# Patient Record
Sex: FEMALE | Race: WHITE | NOT HISPANIC OR LATINO
[De-identification: names, ages, dates, MRNs, and addresses within clinical notes are randomized per-mention and may not be internally consistent; named-entity substitution may affect disease eponyms.]

---

## 2024-04-17 PROBLEM — Z00.00 ENCOUNTER FOR PREVENTIVE HEALTH EXAMINATION: Status: ACTIVE | Noted: 2024-04-17

## 2024-04-23 ENCOUNTER — APPOINTMENT (OUTPATIENT)
Dept: COLORECTAL SURGERY | Facility: CLINIC | Age: 89
End: 2024-04-23
Payer: MEDICARE

## 2024-04-23 VITALS
OXYGEN SATURATION: 97 % | BODY MASS INDEX: 21.6 KG/M2 | HEIGHT: 60 IN | RESPIRATION RATE: 16 BRPM | TEMPERATURE: 98.4 F | DIASTOLIC BLOOD PRESSURE: 66 MMHG | SYSTOLIC BLOOD PRESSURE: 115 MMHG | HEART RATE: 68 BPM | WEIGHT: 110 LBS

## 2024-04-23 DIAGNOSIS — Z80.0 FAMILY HISTORY OF MALIGNANT NEOPLASM OF DIGESTIVE ORGANS: ICD-10-CM

## 2024-04-23 DIAGNOSIS — K56.609 UNSPECIFIED INTESTINAL OBSTRUCTION, UNSPECIFIED AS TO PARTIAL VERSUS COMPLETE OBSTRUCTION: ICD-10-CM

## 2024-04-23 DIAGNOSIS — Z86.79 PERSONAL HISTORY OF OTHER DISEASES OF THE CIRCULATORY SYSTEM: ICD-10-CM

## 2024-04-23 DIAGNOSIS — Z87.442 PERSONAL HISTORY OF URINARY CALCULI: ICD-10-CM

## 2024-04-23 DIAGNOSIS — Z78.9 OTHER SPECIFIED HEALTH STATUS: ICD-10-CM

## 2024-04-23 DIAGNOSIS — Z86.39 PERSONAL HISTORY OF OTHER ENDOCRINE, NUTRITIONAL AND METABOLIC DISEASE: ICD-10-CM

## 2024-04-23 DIAGNOSIS — Z86.19 PERSONAL HISTORY OF OTHER INFECTIOUS AND PARASITIC DISEASES: ICD-10-CM

## 2024-04-23 DIAGNOSIS — Z87.440 PERSONAL HISTORY OF URINARY (TRACT) INFECTIONS: ICD-10-CM

## 2024-04-23 PROCEDURE — 99204 OFFICE O/P NEW MOD 45 MIN: CPT

## 2024-04-23 RX ORDER — LEVOTHYROXINE SODIUM 137 UG/1
TABLET ORAL
Refills: 0 | Status: ACTIVE | COMMUNITY

## 2024-04-23 RX ORDER — LORATADINE 10 MG/1
10 CAPSULE, LIQUID FILLED ORAL
Refills: 0 | Status: ACTIVE | COMMUNITY

## 2024-04-23 RX ORDER — NEBIVOLOL HYDROCHLORIDE 2.5 MG/1
2.5 TABLET ORAL
Refills: 0 | Status: ACTIVE | COMMUNITY

## 2024-04-23 RX ORDER — ESCITALOPRAM OXALATE 5 MG/1
5 TABLET ORAL
Refills: 0 | Status: ACTIVE | COMMUNITY

## 2024-04-23 NOTE — PHYSICAL EXAM
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

## 2024-04-23 NOTE — ASSESSMENT
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 Hospital record reviewed -Patient with a partially obstructing  terminal ileal/cecal mass in the sigmoid colon lesion. We discussed the surgical treatment of these lesions at length including subtotal colectomy with end ileostomy, right hemicolectomy and sigmoid colon resection with and without diverting stoma. We reviewed the risks and benefits of each option including leak rates And need for additional surgery in the case of reversal. The patient elected to transfer her care to this facility, I would recommend flexible sigmoidoscopy to evaluate location of sigmoid colon lesion and to determine the feasibility of endomucosal resection.  However, it appears from the colonoscopy report but this did not seem to be an option -We discussed and has recovery protocol and laparoscopic approaches -Given that the patient is largely wheelchair-bound, my inclination would be for the patient to undergo subtotal colectomy with end ileostomy. We also discussed the possibility of converting ileostomy. -All questions were answered -The patient wishes to consider her options and will call the office if she wishes to proceed -In the meantime, I recommended patient follow a low residue diet and take MiraLax daily. Unfortunately, some intervention will be required sooner than later given recent obstruction

## 2024-04-23 NOTE — CONSULT LETTER
[Dear  ___] : Dear  [unfilled], [Consult Letter:] : I had the pleasure of evaluating your patient, [unfilled]. [Please see my note below.] : Please see my note below. [Consult Closing:] : Thank you very much for allowing me to participate in the care of this patient.  If you have any questions, please do not hesitate to contact me. [Sincerely,] : Sincerely, [FreeTextEntry2] : Robinson Majano [FreeTextEntry3] : Julio Chung MD FACS Chief Colon and Rectal Surgery [facility]

## 2024-04-23 NOTE — REASON FOR VISIT
[Consultation] : a consultation visit [Family Member] : family member [FreeTextEntry1] : Pt. intake forms reviewed.

## 2024-04-23 NOTE — REVIEW OF SYSTEMS
[Feeling Tired] : feeling tired [Eyesight Problems] : eyesight problems [As Noted in HPI] : as noted in HPI [Abdominal Pain] : abdominal pain [Arthralgias] : arthralgias [Depression] : depression [Negative] : Heme/Lymph [Fever] : no fever [Chills] : no chills [Recent Weight Gain (___ Lbs)] : no recent weight gain [Recent Weight Loss (___ Lbs)] : no recent weight loss [Eye Pain] : no eye pain [Red Eyes] : eyes not red [Discharge From Eyes] : no purulent discharge from the eyes [Dry Eyes] : no dryness of the eyes [Eyes Itch] : no itching of the eyes [Heart Rate Is Slow] : the heart rate was not slow [Heart Rate Is Fast] : the heart rate was not fast [Chest Pain] : no chest pain [Palpitations] : no palpitations [Leg Claudication] : no intermittent leg claudication [Suicidal] : not suicidal [Sleep Disturbances] : no sleep disturbances [Anxiety] : no anxiety [FreeTextEntry3] : eyeglasses [FreeTextEntry8] : hx of kidney stones [FreeTextEntry9] : ambulates with walker, kyphosis, requiring PT for ambulation [de-identified] : hx of hypothyroid on Levothyroxine

## 2024-04-23 NOTE — HISTORY OF PRESENT ILLNESS
[FreeTextEntry1] : 87 y/o female with history of SBO presents today for consultation.  She was recently hospitalized at [facility] 3/20/24-3/28/24 with SBO.  CT showed terminal ileum thickening, possible small bowel neoplasm with upstream fluid distention suggesting partial SBO.    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   Pathology of terminal ileum biopsy revealed at least high-grade dysplasia, superficial fragments. 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   They are here for a second opinion.    Of note, she has significant medical problems including HTN, hypothyroidism, osteoporosis, hx of UTI, kidney stones, septic shock s/p cystoscopy, stone extraction.

## 2024-05-07 ENCOUNTER — APPOINTMENT (OUTPATIENT)
Dept: COLORECTAL SURGERY | Facility: CLINIC | Age: 89
End: 2024-05-07
Payer: MEDICARE

## 2024-05-07 DIAGNOSIS — D49.0 NEOPLASM OF UNSPECIFIED BEHAVIOR OF DIGESTIVE SYSTEM: ICD-10-CM

## 2024-05-07 PROCEDURE — 45331Z: CUSTOM

## 2024-05-07 PROCEDURE — 99213 OFFICE O/P EST LOW 20 MIN: CPT | Mod: 25

## 2024-05-07 RX ORDER — NEOMYCIN SULFATE 500 MG/1
500 TABLET ORAL
Qty: 3 | Refills: 0 | Status: ACTIVE | COMMUNITY
Start: 2024-05-07 | End: 1900-01-01

## 2024-05-07 RX ORDER — METRONIDAZOLE 500 MG/1
500 TABLET ORAL
Qty: 3 | Refills: 0 | Status: ACTIVE | COMMUNITY
Start: 2024-05-07 | End: 1900-01-01

## 2024-05-07 NOTE — ASSESSMENT
[FreeTextEntry1] : Terminal ileal mass in sigmoid colon mass -We once again discussed surgical options  this included attempt at sigmoid polyp excision with right hemicolectomy, subtotal colectomy with end ileostomy and descending colon resection and right hemicolectomy. Risks and benefits of each Option were reviewed at length. Given patient's age, history of fecal incontinence and decreased mobility, I recommended subtotal colectomy with ileostomy. -We discussed risks and benefits of this procedure at length including bleeding, infection, high ileostomy output -Enhance recovery protocol was reviewed as was the possibility of rehabilitation -Patient to obtain a cardiac clearance -We'll schedule on a somewhat urgent basis given known obstructing lesion

## 2024-05-07 NOTE — PHYSICAL EXAM
[FreeTextEntry1] : Alert and oriented x3 Moves all extremities, largely wheelchair-bound Abdomen soft nontender External anal exam no masses or lesions Digital rectal exam normal tone Flexible sigmoidoscopy-procedure performed in standard fashion under direct vision with an adult scope. Patient tolerated without event or complication. Scope introduced in the anus and advanced to the descending colon. A polypoid 4-5 cm mass was appreciated it appeared like it may be on a stalk. Multiple biopsies were taken with cold forceps in standard fashion
37.1

## 2024-05-07 NOTE — HISTORY OF PRESENT ILLNESS
[FreeTextEntry1] : 88-year-old female with history of recently diagnosed obstructing cecal mass with large sigmoid colon polyp first mass. Patient presents today to discuss surgical options as well as for sigmoidoscopy to evaluate sigmoid lesion. She reports multiple loose bowel movements per day currently with persistent incontinence. No fevers or chills no nausea or vomiting. No aggravating factors

## 2024-05-10 LAB — CORE LAB BIOPSY: NORMAL

## 2024-05-14 ENCOUNTER — TRANSCRIPTION ENCOUNTER (OUTPATIENT)
Age: 89
End: 2024-05-14

## 2024-05-16 ENCOUNTER — OUTPATIENT (OUTPATIENT)
Dept: OUTPATIENT SERVICES | Facility: HOSPITAL | Age: 89
LOS: 1 days | End: 2024-05-16

## 2024-05-16 VITALS
OXYGEN SATURATION: 96 % | HEIGHT: 63 IN | DIASTOLIC BLOOD PRESSURE: 64 MMHG | TEMPERATURE: 98 F | WEIGHT: 98.99 LBS | RESPIRATION RATE: 20 BRPM | SYSTOLIC BLOOD PRESSURE: 115 MMHG | HEART RATE: 61 BPM

## 2024-05-16 DIAGNOSIS — Z98.890 OTHER SPECIFIED POSTPROCEDURAL STATES: Chronic | ICD-10-CM

## 2024-05-16 DIAGNOSIS — D49.0 NEOPLASM OF UNSPECIFIED BEHAVIOR OF DIGESTIVE SYSTEM: ICD-10-CM

## 2024-05-16 DIAGNOSIS — I10 ESSENTIAL (PRIMARY) HYPERTENSION: ICD-10-CM

## 2024-05-16 LAB
A1C WITH ESTIMATED AVERAGE GLUCOSE RESULT: 5 % — SIGNIFICANT CHANGE UP (ref 4–5.6)
BLD GP AB SCN SERPL QL: NEGATIVE — SIGNIFICANT CHANGE UP
ESTIMATED AVERAGE GLUCOSE: 97 — SIGNIFICANT CHANGE UP
RH IG SCN BLD-IMP: POSITIVE — SIGNIFICANT CHANGE UP
RH IG SCN BLD-IMP: POSITIVE — SIGNIFICANT CHANGE UP

## 2024-05-16 RX ORDER — LEVOTHYROXINE SODIUM 125 MCG
1 TABLET ORAL
Refills: 0 | DISCHARGE

## 2024-05-16 RX ORDER — SODIUM CHLORIDE 9 MG/ML
1000 INJECTION, SOLUTION INTRAVENOUS
Refills: 0 | Status: DISCONTINUED | OUTPATIENT
Start: 2024-05-20 | End: 2024-05-20

## 2024-05-16 NOTE — H&P PST ADULT - NECK
supple/no palpable masses Propranolol Pregnancy And Lactation Text: This medication is Pregnancy Category C and it isn't known if it is safe during pregnancy. It is excreted in breast milk.

## 2024-05-16 NOTE — H&P PST ADULT - HISTORY OF PRESENT ILLNESS
89 y/o female with history of SBO presents today for consultation. She was recently hospitalized at [facility] 3/20/24-3/28/24 with SBO. CT showed terminal ileum thickening, possible small bowel neoplasm with upstream fluid distention suggesting partial SBO.  Of note, she has significant medical problems including HTN, hypothyroidism, osteoporosis, hx of UTI, kidney stones, septic shock s/p cystoscopy, stone extraction.  Patient is now scheduled for Subtotal Colectomy tentatively on 05/20/24.  ?  ?  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 87 y/o female with history of SBO presents today for preop evaluation  with dx of Neoplasm of Digestive System. She was recently hospitalized at [facility] 3/20/24-3/28/24 with SBO. CT showed terminal ileum thickening, possible small bowel neoplasm with upstream fluid distention suggesting partial SBO.  Of note, she has significant medical problems including HTN, hypothyroidism, osteoporosis, hx of UTI, kidney stones, septic shock s/p cystoscopy, stone extraction.  Patient is now scheduled for Subtotal Colectomy tentatively on 05/20/24.  ?  ?  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 89 y/o female with history of SBO presents today for preop evaluation  with dx of Neoplasm of Digestive System. She was recently hospitalized at [facility] 3/20/24-3/28/24 with SBO. CT showed terminal ileum thickening, possible small bowel neoplasm with upstream fluid distention suggesting partial SBO.  Of note, she has significant medical problems including HTN, hypothyroidism, osteoporosis, hx of UTI, kidney stones, septic shock s/p cystoscopy, stone extraction and remote hx of subdural hematoma s/p craniotomy.  Patient is now scheduled for Subtotal Colectomy tentatively on 05/20/24.  ?  ?  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

## 2024-05-16 NOTE — H&P PST ADULT - FUNCTIONAL STATUS
less than 4 METS DASI=3.30/less than 4 METS DASI=3.30 had multiple fall accidents currently gets around in wheelchair for fear of falling/less than 4 METS

## 2024-05-16 NOTE — H&P PST ADULT - NSICDXPASTMEDICALHX_GEN_ALL_CORE_FT
PAST MEDICAL HISTORY:  History of subdural hematoma     HTN (hypertension)     Hypothyroidism     Major depression     Osteoporosis      PAST MEDICAL HISTORY:  History of subdural hematoma     HTN (hypertension)     Hypothyroidism     Major depression     Neoplasm of digestive system     Osteoporosis

## 2024-05-16 NOTE — H&P PST ADULT - NSANTHOSAYNRD_GEN_A_CORE
No. ESTRADA screening performed.  STOP BANG Legend: 0-2 = LOW Risk; 3-4 = INTERMEDIATE Risk; 5-8 = HIGH Risk

## 2024-05-16 NOTE — H&P PST ADULT - PROBLEM SELECTOR PLAN 2
Patient's son-in-law Julio instructed that patient should take Bystolic on the morning of procedure.

## 2024-05-16 NOTE — H&P PST ADULT - MUSCULOSKELETAL COMMENTS
wheelchair bound wheelchair bound from multiple fall accidents patient is afraid to walk with walker  for fear of falling

## 2024-05-16 NOTE — H&P PST ADULT - PROBLEM SELECTOR PLAN 1
Scheduled for Subtotal Colectomy on 05/20/24.  Preop instructions provided to patient's son-in-law Julio who verbalizes understanding.  Labs done and results pending.  Famotidine provided with instructions. Hibiclens provided with instructions and was signed by patient. Teach-back method was utilized to assess patient's understanding. Julio verbalized understanding.  Patient's son in law instructed that patient should take Synthroid on the morning of procedure.

## 2024-05-16 NOTE — H&P PST ADULT - NSICDXPASTSURGICALHX_GEN_ALL_CORE_FT
PAST SURGICAL HISTORY:  S/P cystoscopy     Status post craniectomy      PAST SURGICAL HISTORY:  S/P cystoscopy     Status post craniotomy

## 2024-05-17 NOTE — ASU PATIENT PROFILE, ADULT - NSICDXPASTMEDICALHX_GEN_ALL_CORE_FT
PAST MEDICAL HISTORY:  History of subdural hematoma     HTN (hypertension)     Hypothyroidism     Major depression     Neoplasm of digestive system     Osteoporosis

## 2024-05-19 ENCOUNTER — TRANSCRIPTION ENCOUNTER (OUTPATIENT)
Age: 89
End: 2024-05-19

## 2024-05-20 ENCOUNTER — INPATIENT (INPATIENT)
Facility: HOSPITAL | Age: 89
LOS: 8 days | Discharge: HOME HEALTH | End: 2024-05-29
Attending: STUDENT IN AN ORGANIZED HEALTH CARE EDUCATION/TRAINING PROGRAM | Admitting: STUDENT IN AN ORGANIZED HEALTH CARE EDUCATION/TRAINING PROGRAM
Payer: MEDICARE

## 2024-05-20 ENCOUNTER — APPOINTMENT (OUTPATIENT)
Dept: COLORECTAL SURGERY | Facility: HOSPITAL | Age: 89
End: 2024-05-20
Payer: MEDICARE

## 2024-05-20 ENCOUNTER — RESULT REVIEW (OUTPATIENT)
Age: 89
End: 2024-05-20

## 2024-05-20 VITALS
DIASTOLIC BLOOD PRESSURE: 51 MMHG | WEIGHT: 98.99 LBS | RESPIRATION RATE: 16 BRPM | OXYGEN SATURATION: 95 % | HEIGHT: 63 IN | TEMPERATURE: 98 F | SYSTOLIC BLOOD PRESSURE: 119 MMHG | HEART RATE: 64 BPM

## 2024-05-20 DIAGNOSIS — D49.0 NEOPLASM OF UNSPECIFIED BEHAVIOR OF DIGESTIVE SYSTEM: ICD-10-CM

## 2024-05-20 DIAGNOSIS — Z98.890 OTHER SPECIFIED POSTPROCEDURAL STATES: Chronic | ICD-10-CM

## 2024-05-20 LAB
ADD ON TEST-SPECIMEN IN LAB: SIGNIFICANT CHANGE UP
ALBUMIN SERPL ELPH-MCNC: 3 G/DL — LOW (ref 3.3–5)
ALP SERPL-CCNC: 45 U/L — SIGNIFICANT CHANGE UP (ref 40–120)
ALT FLD-CCNC: 8 U/L — SIGNIFICANT CHANGE UP (ref 4–33)
ANION GAP SERPL CALC-SCNC: 12 MMOL/L — SIGNIFICANT CHANGE UP (ref 7–14)
APTT BLD: 29.5 SEC — SIGNIFICANT CHANGE UP (ref 24.5–35.6)
AST SERPL-CCNC: 16 U/L — SIGNIFICANT CHANGE UP (ref 4–32)
BASOPHILS # BLD AUTO: 0.02 K/UL — SIGNIFICANT CHANGE UP (ref 0–0.2)
BASOPHILS NFR BLD AUTO: 0.2 % — SIGNIFICANT CHANGE UP (ref 0–2)
BILIRUB DIRECT SERPL-MCNC: 0.2 MG/DL — SIGNIFICANT CHANGE UP (ref 0–0.3)
BILIRUB INDIRECT FLD-MCNC: 0.6 MG/DL — SIGNIFICANT CHANGE UP (ref 0–1)
BILIRUB SERPL-MCNC: 0.8 MG/DL — SIGNIFICANT CHANGE UP (ref 0.2–1.2)
BLOOD GAS ARTERIAL - LYTES,HGB,ICA,LACT RESULT: SIGNIFICANT CHANGE UP
BUN SERPL-MCNC: 12 MG/DL — SIGNIFICANT CHANGE UP (ref 7–23)
CALCIUM SERPL-MCNC: 8.1 MG/DL — LOW (ref 8.4–10.5)
CHLORIDE SERPL-SCNC: 106 MMOL/L — SIGNIFICANT CHANGE UP (ref 98–107)
CO2 SERPL-SCNC: 22 MMOL/L — SIGNIFICANT CHANGE UP (ref 22–31)
CREAT SERPL-MCNC: 0.63 MG/DL — SIGNIFICANT CHANGE UP (ref 0.5–1.3)
EGFR: 86 ML/MIN/1.73M2 — SIGNIFICANT CHANGE UP
EOSINOPHIL # BLD AUTO: 0.03 K/UL — SIGNIFICANT CHANGE UP (ref 0–0.5)
EOSINOPHIL NFR BLD AUTO: 0.3 % — SIGNIFICANT CHANGE UP (ref 0–6)
GLUCOSE BLDC GLUCOMTR-MCNC: 103 MG/DL — HIGH (ref 70–99)
GLUCOSE SERPL-MCNC: 106 MG/DL — HIGH (ref 70–99)
HCT VFR BLD CALC: 25.9 % — LOW (ref 34.5–45)
HCT VFR BLD CALC: 32.4 % — LOW (ref 34.5–45)
HGB BLD-MCNC: 10.4 G/DL — LOW (ref 11.5–15.5)
HGB BLD-MCNC: 8.6 G/DL — LOW (ref 11.5–15.5)
IANC: 8.24 K/UL — HIGH (ref 1.8–7.4)
IMM GRANULOCYTES NFR BLD AUTO: 0.3 % — SIGNIFICANT CHANGE UP (ref 0–0.9)
INR BLD: 1 RATIO — SIGNIFICANT CHANGE UP (ref 0.85–1.18)
LYMPHOCYTES # BLD AUTO: 0.76 K/UL — LOW (ref 1–3.3)
LYMPHOCYTES # BLD AUTO: 7.9 % — LOW (ref 13–44)
MAGNESIUM SERPL-MCNC: 1.6 MG/DL — SIGNIFICANT CHANGE UP (ref 1.6–2.6)
MCHC RBC-ENTMCNC: 29.4 PG — SIGNIFICANT CHANGE UP (ref 27–34)
MCHC RBC-ENTMCNC: 30.1 PG — SIGNIFICANT CHANGE UP (ref 27–34)
MCHC RBC-ENTMCNC: 32.1 GM/DL — SIGNIFICANT CHANGE UP (ref 32–36)
MCHC RBC-ENTMCNC: 33.2 GM/DL — SIGNIFICANT CHANGE UP (ref 32–36)
MCV RBC AUTO: 90.6 FL — SIGNIFICANT CHANGE UP (ref 80–100)
MCV RBC AUTO: 91.5 FL — SIGNIFICANT CHANGE UP (ref 80–100)
MONOCYTES # BLD AUTO: 0.55 K/UL — SIGNIFICANT CHANGE UP (ref 0–0.9)
MONOCYTES NFR BLD AUTO: 5.7 % — SIGNIFICANT CHANGE UP (ref 2–14)
NEUTROPHILS # BLD AUTO: 8.24 K/UL — HIGH (ref 1.8–7.4)
NEUTROPHILS NFR BLD AUTO: 85.6 % — HIGH (ref 43–77)
NRBC # BLD: 0 /100 WBCS — SIGNIFICANT CHANGE UP (ref 0–0)
NRBC # BLD: 0 /100 WBCS — SIGNIFICANT CHANGE UP (ref 0–0)
NRBC # FLD: 0 K/UL — SIGNIFICANT CHANGE UP (ref 0–0)
NRBC # FLD: 0 K/UL — SIGNIFICANT CHANGE UP (ref 0–0)
PHOSPHATE SERPL-MCNC: 2.6 MG/DL — SIGNIFICANT CHANGE UP (ref 2.5–4.5)
PLATELET # BLD AUTO: 197 K/UL — SIGNIFICANT CHANGE UP (ref 150–400)
PLATELET # BLD AUTO: 243 K/UL — SIGNIFICANT CHANGE UP (ref 150–400)
POTASSIUM SERPL-MCNC: 3.2 MMOL/L — LOW (ref 3.5–5.3)
POTASSIUM SERPL-SCNC: 3.2 MMOL/L — LOW (ref 3.5–5.3)
PROT SERPL-MCNC: 5.5 G/DL — LOW (ref 6–8.3)
PROTHROM AB SERPL-ACNC: 11.3 SEC — SIGNIFICANT CHANGE UP (ref 9.5–13)
RBC # BLD: 2.86 M/UL — LOW (ref 3.8–5.2)
RBC # BLD: 3.54 M/UL — LOW (ref 3.8–5.2)
RBC # FLD: 14.8 % — HIGH (ref 10.3–14.5)
RBC # FLD: 15.2 % — HIGH (ref 10.3–14.5)
SODIUM SERPL-SCNC: 140 MMOL/L — SIGNIFICANT CHANGE UP (ref 135–145)
WBC # BLD: 9.11 K/UL — SIGNIFICANT CHANGE UP (ref 3.8–10.5)
WBC # BLD: 9.63 K/UL — SIGNIFICANT CHANGE UP (ref 3.8–10.5)
WBC # FLD AUTO: 9.11 K/UL — SIGNIFICANT CHANGE UP (ref 3.8–10.5)
WBC # FLD AUTO: 9.63 K/UL — SIGNIFICANT CHANGE UP (ref 3.8–10.5)

## 2024-05-20 PROCEDURE — 88309 TISSUE EXAM BY PATHOLOGIST: CPT | Mod: 26

## 2024-05-20 PROCEDURE — 88342 IMHCHEM/IMCYTCHM 1ST ANTB: CPT | Mod: 26

## 2024-05-20 PROCEDURE — 88341 IMHCHEM/IMCYTCHM EA ADD ANTB: CPT | Mod: 26

## 2024-05-20 PROCEDURE — 88305 TISSUE EXAM BY PATHOLOGIST: CPT | Mod: 26

## 2024-05-20 PROCEDURE — 44210 LAPARO TOTAL PROCTOCOLECTOMY: CPT

## 2024-05-20 PROCEDURE — 49321 LAPAROSCOPY BIOPSY: CPT | Mod: 59

## 2024-05-20 DEVICE — LIGATING CLIPS WECK HORIZON LARGE (ORANGE) 24
Type: IMPLANTABLE DEVICE | Status: NON-FUNCTIONAL
Removed: 2024-05-20

## 2024-05-20 DEVICE — STAPLER COVIDIEN GIA 80-3.0MM PURPLE
Type: IMPLANTABLE DEVICE | Status: NON-FUNCTIONAL
Removed: 2024-05-20

## 2024-05-20 DEVICE — STAPLER COVIDIEN GIA 80-3.0MM PURPLE RELOAD
Type: IMPLANTABLE DEVICE | Status: NON-FUNCTIONAL
Removed: 2024-05-20

## 2024-05-20 DEVICE — LIGATING CLIPS WECK HORIZON MEDIUM (BLUE) 24
Type: IMPLANTABLE DEVICE | Status: NON-FUNCTIONAL
Removed: 2024-05-20

## 2024-05-20 RX ORDER — FENTANYL CITRATE 50 UG/ML
25 INJECTION INTRAVENOUS
Refills: 0 | Status: DISCONTINUED | OUTPATIENT
Start: 2024-05-20 | End: 2024-05-21

## 2024-05-20 RX ORDER — POTASSIUM CHLORIDE 20 MEQ
10 PACKET (EA) ORAL
Refills: 0 | Status: COMPLETED | OUTPATIENT
Start: 2024-05-20 | End: 2024-05-20

## 2024-05-20 RX ORDER — EPHEDRINE SULFATE/0.9% NACL/PF 25 MG/5 ML
5 SYRINGE (ML) INTRAVENOUS ONCE
Refills: 0 | Status: COMPLETED | OUTPATIENT
Start: 2024-05-20 | End: 2024-05-20

## 2024-05-20 RX ORDER — ESCITALOPRAM OXALATE 10 MG/1
1 TABLET, FILM COATED ORAL
Refills: 0 | DISCHARGE

## 2024-05-20 RX ORDER — ESCITALOPRAM OXALATE 10 MG/1
5 TABLET, FILM COATED ORAL DAILY
Refills: 0 | Status: DISCONTINUED | OUTPATIENT
Start: 2024-05-21 | End: 2024-05-29

## 2024-05-20 RX ORDER — OXYCODONE HYDROCHLORIDE 5 MG/1
2.5 TABLET ORAL EVERY 4 HOURS
Refills: 0 | Status: DISCONTINUED | OUTPATIENT
Start: 2024-05-20 | End: 2024-05-21

## 2024-05-20 RX ORDER — OXYCODONE HYDROCHLORIDE 5 MG/1
5 TABLET ORAL EVERY 4 HOURS
Refills: 0 | Status: DISCONTINUED | OUTPATIENT
Start: 2024-05-20 | End: 2024-05-21

## 2024-05-20 RX ORDER — LORATADINE 10 MG/1
10 TABLET ORAL DAILY
Refills: 0 | Status: DISCONTINUED | OUTPATIENT
Start: 2024-05-20 | End: 2024-05-29

## 2024-05-20 RX ORDER — HEPARIN SODIUM 5000 [USP'U]/ML
5000 INJECTION INTRAVENOUS; SUBCUTANEOUS EVERY 8 HOURS
Refills: 0 | Status: DISCONTINUED | OUTPATIENT
Start: 2024-05-20 | End: 2024-05-29

## 2024-05-20 RX ORDER — ALBUMIN HUMAN 25 %
250 VIAL (ML) INTRAVENOUS ONCE
Refills: 0 | Status: COMPLETED | OUTPATIENT
Start: 2024-05-20 | End: 2024-05-20

## 2024-05-20 RX ORDER — HYDROMORPHONE HYDROCHLORIDE 2 MG/ML
0.5 INJECTION INTRAMUSCULAR; INTRAVENOUS; SUBCUTANEOUS
Refills: 0 | Status: DISCONTINUED | OUTPATIENT
Start: 2024-05-20 | End: 2024-05-20

## 2024-05-20 RX ORDER — CHLORHEXIDINE GLUCONATE 213 G/1000ML
1 SOLUTION TOPICAL ONCE
Refills: 0 | Status: COMPLETED | OUTPATIENT
Start: 2024-05-20 | End: 2024-05-20

## 2024-05-20 RX ORDER — ACETAMINOPHEN 500 MG
1000 TABLET ORAL EVERY 6 HOURS
Refills: 0 | Status: DISCONTINUED | OUTPATIENT
Start: 2024-05-20 | End: 2024-05-21

## 2024-05-20 RX ORDER — ONDANSETRON 8 MG/1
4 TABLET, FILM COATED ORAL EVERY 6 HOURS
Refills: 0 | Status: DISCONTINUED | OUTPATIENT
Start: 2024-05-20 | End: 2024-05-21

## 2024-05-20 RX ORDER — HYDROMORPHONE HYDROCHLORIDE 2 MG/ML
0.25 INJECTION INTRAMUSCULAR; INTRAVENOUS; SUBCUTANEOUS
Refills: 0 | Status: DISCONTINUED | OUTPATIENT
Start: 2024-05-20 | End: 2024-05-20

## 2024-05-20 RX ORDER — LEVOTHYROXINE SODIUM 125 MCG
50 TABLET ORAL DAILY
Refills: 0 | Status: DISCONTINUED | OUTPATIENT
Start: 2024-05-21 | End: 2024-05-23

## 2024-05-20 RX ORDER — GABAPENTIN 400 MG/1
600 CAPSULE ORAL ONCE
Refills: 0 | Status: COMPLETED | OUTPATIENT
Start: 2024-05-20 | End: 2024-05-20

## 2024-05-20 RX ORDER — POTASSIUM PHOSPHATE, MONOBASIC POTASSIUM PHOSPHATE, DIBASIC 236; 224 MG/ML; MG/ML
15 INJECTION, SOLUTION INTRAVENOUS ONCE
Refills: 0 | Status: COMPLETED | OUTPATIENT
Start: 2024-05-20 | End: 2024-05-20

## 2024-05-20 RX ORDER — LORATADINE 10 MG/1
1 TABLET ORAL
Refills: 0 | DISCHARGE

## 2024-05-20 RX ORDER — ONDANSETRON 8 MG/1
4 TABLET, FILM COATED ORAL ONCE
Refills: 0 | Status: DISCONTINUED | OUTPATIENT
Start: 2024-05-20 | End: 2024-05-21

## 2024-05-20 RX ORDER — HYDROMORPHONE HYDROCHLORIDE 2 MG/ML
0.3 INJECTION INTRAMUSCULAR; INTRAVENOUS; SUBCUTANEOUS
Refills: 0 | Status: DISCONTINUED | OUTPATIENT
Start: 2024-05-20 | End: 2024-05-20

## 2024-05-20 RX ORDER — MAGNESIUM SULFATE 500 MG/ML
2 VIAL (ML) INJECTION ONCE
Refills: 0 | Status: COMPLETED | OUTPATIENT
Start: 2024-05-20 | End: 2024-05-20

## 2024-05-20 RX ORDER — LEVOTHYROXINE SODIUM 125 MCG
1 TABLET ORAL
Refills: 0 | DISCHARGE

## 2024-05-20 RX ORDER — PHENYLEPHRINE HYDROCHLORIDE 10 MG/ML
0.2 INJECTION INTRAVENOUS
Qty: 40 | Refills: 0 | Status: DISCONTINUED | OUTPATIENT
Start: 2024-05-20 | End: 2024-05-21

## 2024-05-20 RX ORDER — CALCIUM GLUCONATE 100 MG/ML
1 VIAL (ML) INTRAVENOUS ONCE
Refills: 0 | Status: DISCONTINUED | OUTPATIENT
Start: 2024-05-20 | End: 2024-05-21

## 2024-05-20 RX ORDER — SODIUM CHLORIDE 9 MG/ML
1000 INJECTION, SOLUTION INTRAVENOUS
Refills: 0 | Status: DISCONTINUED | OUTPATIENT
Start: 2024-05-20 | End: 2024-05-21

## 2024-05-20 RX ADMIN — Medication 100 MILLIEQUIVALENT(S): at 19:45

## 2024-05-20 RX ADMIN — Medication 25 GRAM(S): at 21:38

## 2024-05-20 RX ADMIN — Medication 5 MILLIGRAM(S): at 20:45

## 2024-05-20 RX ADMIN — GABAPENTIN 600 MILLIGRAM(S): 400 CAPSULE ORAL at 10:30

## 2024-05-20 RX ADMIN — Medication 5 MILLIGRAM(S): at 22:45

## 2024-05-20 RX ADMIN — Medication 5 MILLIGRAM(S): at 20:30

## 2024-05-20 RX ADMIN — SODIUM CHLORIDE 40 MILLILITER(S): 9 INJECTION, SOLUTION INTRAVENOUS at 20:36

## 2024-05-20 RX ADMIN — SODIUM CHLORIDE 40 MILLILITER(S): 9 INJECTION, SOLUTION INTRAVENOUS at 18:45

## 2024-05-20 RX ADMIN — Medication 100 MILLIEQUIVALENT(S): at 20:40

## 2024-05-20 RX ADMIN — Medication 250 MILLILITER(S): at 19:00

## 2024-05-20 RX ADMIN — Medication 500 MILLILITER(S): at 19:45

## 2024-05-20 RX ADMIN — PHENYLEPHRINE HYDROCHLORIDE 3.36 MICROGRAM(S)/KG/MIN: 10 INJECTION INTRAVENOUS at 23:52

## 2024-05-20 RX ADMIN — POTASSIUM PHOSPHATE, MONOBASIC POTASSIUM PHOSPHATE, DIBASIC 62.5 MILLIMOLE(S): 236; 224 INJECTION, SOLUTION INTRAVENOUS at 20:34

## 2024-05-20 RX ADMIN — Medication 100 MILLIEQUIVALENT(S): at 23:27

## 2024-05-20 RX ADMIN — CHLORHEXIDINE GLUCONATE 1 APPLICATION(S): 213 SOLUTION TOPICAL at 10:27

## 2024-05-20 NOTE — BRIEF OPERATIVE NOTE - NSICDXBRIEFPROCEDURE_GEN_ALL_CORE_FT
PROCEDURES:  Laparoscopy assisted total abdominal colectomy 20-May-2024 18:14:28  Lian Ervin  Creation, end ileostomy 20-May-2024 18:14:37  Lian Ervin

## 2024-05-20 NOTE — BRIEF OPERATIVE NOTE - OPERATION/FINDINGS
Laparoscopic mobilization of colon. Cecal mass noted to be dense and adhered to pelvic sidewall. Small nodularity in mesentery biopsies, negative for malignancy on frozen. Palpable mobile mass in sigmoid with tattoo palpable. Total abdominal colectomy performed. Rectum transected with 80mm purple CHICO. End ileostomy matured in RLQ.

## 2024-05-20 NOTE — ASU PREOP CHECKLIST - SELECT TESTS ORDERED
Type and Cross/POCT Blood Glucose @1038/Type and Cross/POCT Blood Glucose @1038/Type and Cross/EKG/POCT Blood Glucose

## 2024-05-21 LAB
ANION GAP SERPL CALC-SCNC: 8 MMOL/L — SIGNIFICANT CHANGE UP (ref 7–14)
ANION GAP SERPL CALC-SCNC: 9 MMOL/L — SIGNIFICANT CHANGE UP (ref 7–14)
ANION GAP SERPL CALC-SCNC: 9 MMOL/L — SIGNIFICANT CHANGE UP (ref 7–14)
BLOOD GAS ARTERIAL - LYTES,HGB,ICA,LACT RESULT: SIGNIFICANT CHANGE UP
BLOOD GAS ARTERIAL COMPREHENSIVE RESULT: SIGNIFICANT CHANGE UP
BUN SERPL-MCNC: 10 MG/DL — SIGNIFICANT CHANGE UP (ref 7–23)
BUN SERPL-MCNC: 11 MG/DL — SIGNIFICANT CHANGE UP (ref 7–23)
BUN SERPL-MCNC: 9 MG/DL — SIGNIFICANT CHANGE UP (ref 7–23)
CALCIUM SERPL-MCNC: 7.9 MG/DL — LOW (ref 8.4–10.5)
CALCIUM SERPL-MCNC: 8.1 MG/DL — LOW (ref 8.4–10.5)
CALCIUM SERPL-MCNC: 8.2 MG/DL — LOW (ref 8.4–10.5)
CHLORIDE SERPL-SCNC: 107 MMOL/L — SIGNIFICANT CHANGE UP (ref 98–107)
CHLORIDE SERPL-SCNC: 107 MMOL/L — SIGNIFICANT CHANGE UP (ref 98–107)
CHLORIDE SERPL-SCNC: 109 MMOL/L — HIGH (ref 98–107)
CO2 SERPL-SCNC: 22 MMOL/L — SIGNIFICANT CHANGE UP (ref 22–31)
CO2 SERPL-SCNC: 24 MMOL/L — SIGNIFICANT CHANGE UP (ref 22–31)
CO2 SERPL-SCNC: 24 MMOL/L — SIGNIFICANT CHANGE UP (ref 22–31)
CREAT SERPL-MCNC: 0.56 MG/DL — SIGNIFICANT CHANGE UP (ref 0.5–1.3)
CREAT SERPL-MCNC: 0.63 MG/DL — SIGNIFICANT CHANGE UP (ref 0.5–1.3)
CREAT SERPL-MCNC: 0.63 MG/DL — SIGNIFICANT CHANGE UP (ref 0.5–1.3)
EGFR: 86 ML/MIN/1.73M2 — SIGNIFICANT CHANGE UP
EGFR: 86 ML/MIN/1.73M2 — SIGNIFICANT CHANGE UP
EGFR: 89 ML/MIN/1.73M2 — SIGNIFICANT CHANGE UP
GLUCOSE BLDC GLUCOMTR-MCNC: 77 MG/DL — SIGNIFICANT CHANGE UP (ref 70–99)
GLUCOSE BLDC GLUCOMTR-MCNC: 82 MG/DL — SIGNIFICANT CHANGE UP (ref 70–99)
GLUCOSE SERPL-MCNC: 82 MG/DL — SIGNIFICANT CHANGE UP (ref 70–99)
GLUCOSE SERPL-MCNC: 89 MG/DL — SIGNIFICANT CHANGE UP (ref 70–99)
GLUCOSE SERPL-MCNC: 96 MG/DL — SIGNIFICANT CHANGE UP (ref 70–99)
HCT VFR BLD CALC: 28.9 % — LOW (ref 34.5–45)
HCT VFR BLD CALC: 29.3 % — LOW (ref 34.5–45)
HGB BLD-MCNC: 9.3 G/DL — LOW (ref 11.5–15.5)
HGB BLD-MCNC: 9.7 G/DL — LOW (ref 11.5–15.5)
MAGNESIUM SERPL-MCNC: 2.1 MG/DL — SIGNIFICANT CHANGE UP (ref 1.6–2.6)
MAGNESIUM SERPL-MCNC: 2.2 MG/DL — SIGNIFICANT CHANGE UP (ref 1.6–2.6)
MCHC RBC-ENTMCNC: 29.6 PG — SIGNIFICANT CHANGE UP (ref 27–34)
MCHC RBC-ENTMCNC: 29.7 PG — SIGNIFICANT CHANGE UP (ref 27–34)
MCHC RBC-ENTMCNC: 32.2 GM/DL — SIGNIFICANT CHANGE UP (ref 32–36)
MCHC RBC-ENTMCNC: 33.1 GM/DL — SIGNIFICANT CHANGE UP (ref 32–36)
MCV RBC AUTO: 89.6 FL — SIGNIFICANT CHANGE UP (ref 80–100)
MCV RBC AUTO: 92 FL — SIGNIFICANT CHANGE UP (ref 80–100)
NRBC # BLD: 0 /100 WBCS — SIGNIFICANT CHANGE UP (ref 0–0)
NRBC # BLD: 0 /100 WBCS — SIGNIFICANT CHANGE UP (ref 0–0)
NRBC # FLD: 0 K/UL — SIGNIFICANT CHANGE UP (ref 0–0)
NRBC # FLD: 0 K/UL — SIGNIFICANT CHANGE UP (ref 0–0)
PHOSPHATE SERPL-MCNC: 2.7 MG/DL — SIGNIFICANT CHANGE UP (ref 2.5–4.5)
PHOSPHATE SERPL-MCNC: 3.6 MG/DL — SIGNIFICANT CHANGE UP (ref 2.5–4.5)
PLATELET # BLD AUTO: 244 K/UL — SIGNIFICANT CHANGE UP (ref 150–400)
PLATELET # BLD AUTO: 256 K/UL — SIGNIFICANT CHANGE UP (ref 150–400)
POTASSIUM SERPL-MCNC: 4.1 MMOL/L — SIGNIFICANT CHANGE UP (ref 3.5–5.3)
POTASSIUM SERPL-SCNC: 4.1 MMOL/L — SIGNIFICANT CHANGE UP (ref 3.5–5.3)
RBC # BLD: 3.14 M/UL — LOW (ref 3.8–5.2)
RBC # BLD: 3.27 M/UL — LOW (ref 3.8–5.2)
RBC # FLD: 15.1 % — HIGH (ref 10.3–14.5)
RBC # FLD: 15.4 % — HIGH (ref 10.3–14.5)
SODIUM SERPL-SCNC: 139 MMOL/L — SIGNIFICANT CHANGE UP (ref 135–145)
SODIUM SERPL-SCNC: 140 MMOL/L — SIGNIFICANT CHANGE UP (ref 135–145)
SODIUM SERPL-SCNC: 140 MMOL/L — SIGNIFICANT CHANGE UP (ref 135–145)
WBC # BLD: 12.18 K/UL — HIGH (ref 3.8–10.5)
WBC # BLD: 8.79 K/UL — SIGNIFICANT CHANGE UP (ref 3.8–10.5)
WBC # FLD AUTO: 12.18 K/UL — HIGH (ref 3.8–10.5)
WBC # FLD AUTO: 8.79 K/UL — SIGNIFICANT CHANGE UP (ref 3.8–10.5)

## 2024-05-21 PROCEDURE — 76700 US EXAM ABDOM COMPLETE: CPT | Mod: 26

## 2024-05-21 PROCEDURE — 93010 ELECTROCARDIOGRAM REPORT: CPT

## 2024-05-21 PROCEDURE — 99291 CRITICAL CARE FIRST HOUR: CPT

## 2024-05-21 PROCEDURE — 70450 CT HEAD/BRAIN W/O DYE: CPT | Mod: 26

## 2024-05-21 PROCEDURE — 99232 SBSQ HOSP IP/OBS MODERATE 35: CPT | Mod: GC

## 2024-05-21 PROCEDURE — 76604 US EXAM CHEST: CPT | Mod: 26

## 2024-05-21 RX ORDER — ACETAMINOPHEN 500 MG
1000 TABLET ORAL EVERY 6 HOURS
Refills: 0 | Status: COMPLETED | OUTPATIENT
Start: 2024-05-21 | End: 2024-05-22

## 2024-05-21 RX ORDER — POTASSIUM PHOSPHATE, MONOBASIC POTASSIUM PHOSPHATE, DIBASIC 236; 224 MG/ML; MG/ML
15 INJECTION, SOLUTION INTRAVENOUS ONCE
Refills: 0 | Status: COMPLETED | OUTPATIENT
Start: 2024-05-21 | End: 2024-05-21

## 2024-05-21 RX ORDER — CHLORHEXIDINE GLUCONATE 213 G/1000ML
1 SOLUTION TOPICAL DAILY
Refills: 0 | Status: DISCONTINUED | OUTPATIENT
Start: 2024-05-21 | End: 2024-05-27

## 2024-05-21 RX ORDER — ALBUMIN HUMAN 25 %
250 VIAL (ML) INTRAVENOUS ONCE
Refills: 0 | Status: COMPLETED | OUTPATIENT
Start: 2024-05-21 | End: 2024-05-21

## 2024-05-21 RX ORDER — SODIUM CHLORIDE 9 MG/ML
1000 INJECTION, SOLUTION INTRAVENOUS
Refills: 0 | Status: DISCONTINUED | OUTPATIENT
Start: 2024-05-21 | End: 2024-05-22

## 2024-05-21 RX ORDER — PHENYLEPHRINE HYDROCHLORIDE 10 MG/ML
0.1 INJECTION INTRAVENOUS
Qty: 160 | Refills: 0 | Status: DISCONTINUED | OUTPATIENT
Start: 2024-05-21 | End: 2024-05-22

## 2024-05-21 RX ORDER — DEXTROSE 50 % IN WATER 50 %
25 SYRINGE (ML) INTRAVENOUS ONCE
Refills: 0 | Status: COMPLETED | OUTPATIENT
Start: 2024-05-21 | End: 2024-05-21

## 2024-05-21 RX ADMIN — HEPARIN SODIUM 5000 UNIT(S): 5000 INJECTION INTRAVENOUS; SUBCUTANEOUS at 05:32

## 2024-05-21 RX ADMIN — PHENYLEPHRINE HYDROCHLORIDE 0.84 MICROGRAM(S)/KG/MIN: 10 INJECTION INTRAVENOUS at 05:31

## 2024-05-21 RX ADMIN — SODIUM CHLORIDE 75 MILLILITER(S): 9 INJECTION, SOLUTION INTRAVENOUS at 10:06

## 2024-05-21 RX ADMIN — HEPARIN SODIUM 5000 UNIT(S): 5000 INJECTION INTRAVENOUS; SUBCUTANEOUS at 13:23

## 2024-05-21 RX ADMIN — Medication 400 MILLIGRAM(S): at 11:54

## 2024-05-21 RX ADMIN — LORATADINE 10 MILLIGRAM(S): 10 TABLET ORAL at 11:54

## 2024-05-21 RX ADMIN — Medication 1000 MILLIGRAM(S): at 12:40

## 2024-05-21 RX ADMIN — Medication 125 MILLILITER(S): at 13:23

## 2024-05-21 RX ADMIN — Medication 1000 MILLIGRAM(S): at 18:51

## 2024-05-21 RX ADMIN — CHLORHEXIDINE GLUCONATE 1 APPLICATION(S): 213 SOLUTION TOPICAL at 11:54

## 2024-05-21 RX ADMIN — Medication 25 MILLILITER(S): at 05:31

## 2024-05-21 RX ADMIN — HEPARIN SODIUM 5000 UNIT(S): 5000 INJECTION INTRAVENOUS; SUBCUTANEOUS at 22:05

## 2024-05-21 RX ADMIN — PHENYLEPHRINE HYDROCHLORIDE 0.84 MICROGRAM(S)/KG/MIN: 10 INJECTION INTRAVENOUS at 10:06

## 2024-05-21 RX ADMIN — Medication 400 MILLIGRAM(S): at 23:11

## 2024-05-21 RX ADMIN — SODIUM CHLORIDE 75 MILLILITER(S): 9 INJECTION, SOLUTION INTRAVENOUS at 17:29

## 2024-05-21 RX ADMIN — Medication 400 MILLIGRAM(S): at 17:29

## 2024-05-21 RX ADMIN — POTASSIUM PHOSPHATE, MONOBASIC POTASSIUM PHOSPHATE, DIBASIC 62.5 MILLIMOLE(S): 236; 224 INJECTION, SOLUTION INTRAVENOUS at 14:40

## 2024-05-21 RX ADMIN — ESCITALOPRAM OXALATE 5 MILLIGRAM(S): 10 TABLET, FILM COATED ORAL at 11:54

## 2024-05-21 RX ADMIN — Medication 400 MILLIGRAM(S): at 01:38

## 2024-05-21 RX ADMIN — SODIUM CHLORIDE 75 MILLILITER(S): 9 INJECTION, SOLUTION INTRAVENOUS at 22:06

## 2024-05-21 NOTE — PHYSICAL THERAPY INITIAL EVALUATION ADULT - SIT-TO-STAND BALANCE
Pt to ED from home with father c/o 8 month hx of intermittent epigastric pain and n/v. Dad states he has been to the ER, ICC x2, and GI and all tests/workups have been normal. States GI stated he made need to be scoped. Pt states the reason he is in the ER today is because the pain and n/v are more frequent over the last week.   
fair minus

## 2024-05-21 NOTE — CONSULT NOTE ADULT - ATTENDING COMMENTS
I agree with the detailed interval history, physical, and plan, which I have reviewed and edited where appropriate'; also agree with notes/assessment with my team on service.  I have personally examined the patient.  I was physically present for the key portions of the evaluation and management (E/M) service provided.  I reviewed all the pertinent data.  The patient is a critical care patient with life threatening hemodynamic and metabolic instability in SICU.  The SICU team has a constant risk benefit analyzes discussion and coordinating care with the primary team and all consultants.   The patient is in SICU with the chief complaint and diagnosis mentioned in the note.   The plan will be specified in the note.  87 y/o female s/p colectomy with end ileostomy with post op hypotension. SICU consulted for hemodynamic monitoring.  EXAM:  General/Neuro  Exam: lethargic  Respiratory  Exam: Lungs clear   Cardiovascular  Exam: RR  GI  Exam: Abdomen soft, Non-tender  Extremities  Exam: Extremities warm    PLAN  NEUROLOGIC   - tylenol and oxycodone prn  - Lexapro  RESPIRATORY   - RA  CARDIOVASCULAR   - MAP goal > 65  - wean artemio   GASTROINTESTINAL   - Diet: CLD   /RENAL   - IV fluids: LR @ 40cc/hr  HEMATOLOGIC  -SCDs & subq heparin  INFECTIOUS DISEASE  - Monitor fever  ENDOCRINE  - Monitor glucose  Disposition:   SICU Critical Care Dx  Postoperative hypotension due to sedation/hypovolemia  -maintain MAP of 65, wean phenylephrine as tolerated  -monitor labs, abdominal exam  Hypothyroidism  -IV synthroid   HTN  -holding home meds  DVT prophylaxis  PT  Pain control      Michele Granado MD  Acute and Critical Care Surgery  -------------------------------------------------------------------------------------------------------------------------------------------  night note from Dr. Saba    I agree with the detailed interval history, physical, and plan, which I have reviewed and edited where appropriate'; also agree with notes/assessment with my team on service.  I have personally examined the patient.  I was physically present for the key portions of the evaluation and management (E/M) service provided.  I reviewed all the pertinent data.  The patient is a critical care patient with life threatening hemodynamic and metabolic instability in SICU.  The SICU team has a constant risk benefit analyzes discussion and coordinating care with the primary team and all consultants.   The patient is in SICU with the chief complaint and diagnosis mentioned in the note.   The plan will be specified in the note.  89 y/o female s/p colectomy with end ileostomy with post op hypotension. SICU consulted for hemodynamic monitoring.  EXAM:  General/Neuro  Exam: lethargic  Respiratory  Exam: Lungs clear   Cardiovascular  Exam: RR  GI  Exam: Abdomen soft, Non-tender  Extremities  Exam: Extremities warm    PLAN  NEUROLOGIC   - tylenol and oxycodone prn  - Lexapro  RESPIRATORY   - RA  CARDIOVASCULAR   - MAP goal > 65  - wean artemio   GASTROINTESTINAL   - Diet: CLD   /RENAL   - IV fluids: LR @ 40cc/hr  HEMATOLOGIC  -SCDs & subq heparin  INFECTIOUS DISEASE  - Monitor fever  ENDOCRINE  - Monitor glucose  Disposition:   SICU

## 2024-05-21 NOTE — PHYSICAL THERAPY INITIAL EVALUATION ADULT - PERTINENT HX OF CURRENT PROBLEM, REHAB EVAL
88 year old female with history of SBO presents today for preop evaluation  with dx of Neoplasm of Digestive System. [facility] with SBO. CT showed terminal ileum thickening, possible small bowel neoplasm with upstream fluid distention suggesting partial SBO.  Of note, she has significant medical problems including HTN, hypothyroidism, osteoporosis, hx of UTI, kidney stones, septic shock status post cystoscopy, stone extraction and remote history of subdural hematoma status post craniotomy.  Patient is now scheduled for Subtotal Colectomy tentatively on 05/20/24.

## 2024-05-21 NOTE — CONSULT NOTE ADULT - SUBJECTIVE AND OBJECTIVE BOX
SICU Consultation Note  =====================================================  HPI:   89 y/o female with pmhx of HTN, hypothyroidism, osteoporosis, hx of UTI, kidney stones, septic shock s/p cystoscopy, stone extraction and remote hx of subdural hematoma s/p craniotomy and SBO presents with dx of Neoplasm of Digestive System. She was recently hospitalized at [facility] 3/20/24-3/28/24 with SBO. CT showed terminal ileum thickening, possible small bowel neoplasm with upstream fluid distention suggesting partial SBO. 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 Of note, cecal mass noted to be dense and adhered to pelvic sidewall. Small nodularity in mesentery biopsies, negative for malignancy on frozen. Palpable mobile mass in sigmoid with tattoo palpable. SICU consulted for hemodynamic monitoring.        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 (16 May 2024 11:14)      Surgery Information  OR time:      EBL:          IV Fluids:       Blood Products:   UOP:          PAST MEDICAL & SURGICAL HISTORY:  HTN (hypertension)      Hypothyroidism      Osteoporosis      Major depression      History of subdural hematoma      Neoplasm of digestive system      S/P cystoscopy      Status post craniotomy        Home Meds: Home Medications:  Bystolic 2.5 mg oral tablet: 1 tab(s) orally once a day am (20 May 2024 10:16)  levothyroxine 50 mcg (0.05 mg) oral tablet: 1 tab(s) orally once a day am (20 May 2024 10:16)  Lexapro 5 mg oral tablet: 1 tab(s) orally once a day (20 May 2024 10:16)  loratadine 10 mg oral tablet: 1 tab(s) orally once a day (20 May 2024 10:16)  Simethicone, 25 mg, PO, PRN:  (20 May 2024 10:16)    Allergies: Allergies    nebivolol (Other)    Intolerances      Soc:   Advanced Directives: Presumed Full Code     ROS:    REVIEW OF SYSTEMS    [ ] A ten-point review of systems was otherwise negative except as noted.  [ ] Due to altered mental status/intubation, subjective information were not able to be obtained from the patient. History was obtained, to the extent possible, from review of the chart and collateral sources of information.      CURRENT MEDICATIONS:   --------------------------------------------------------------------------------------  Neurologic Medications  acetaminophen   IVPB .. 1000 milliGRAM(s) IV Intermittent every 6 hours  escitalopram 5 milliGRAM(s) Oral daily  fentaNYL    Injectable 25 MICROGram(s) IV Push every 5 minutes PRN Moderate Pain (4 - 6)  ondansetron Injectable 4 milliGRAM(s) IV Push every 6 hours PRN Nausea and/or Vomiting  ondansetron Injectable 4 milliGRAM(s) IV Push once PRN Nausea and/or Vomiting  oxyCODONE    IR 2.5 milliGRAM(s) Oral every 4 hours PRN Moderate Pain (4 - 6)  oxyCODONE    IR 5 milliGRAM(s) Oral every 4 hours PRN Severe Pain (7 - 10)    Respiratory Medications  loratadine 10 milliGRAM(s) Oral daily    Cardiovascular Medications  phenylephrine    Infusion 0.2 MICROgram(s)/kG/Min IV Continuous <Continuous>    Gastrointestinal Medications  lactated ringers. 1000 milliLiter(s) IV Continuous <Continuous>    Genitourinary Medications    Hematologic/Oncologic Medications  heparin   Injectable 5000 Unit(s) SubCutaneous every 8 hours    Antimicrobial/Immunologic Medications    Endocrine/Metabolic Medications  levothyroxine 50 MICROGram(s) Oral daily    Topical/Other Medications    --------------------------------------------------------------------------------------    VITAL SIGNS, INS/OUTS (last 24 hours):  --------------------------------------------------------------------------------------  ICU Vital Signs Last 24 Hrs  T(C): 36.6 (21 May 2024 00:00), Max: 36.7 (20 May 2024 23:00)  T(F): 97.9 (21 May 2024 00:00), Max: 98.1 (20 May 2024 23:00)  HR: 59 (21 May 2024 02:30) (55 - 76)  BP: 96/41 (21 May 2024 02:00) (85/35 - 132/50)  BP(mean): 55 (21 May 2024 02:00) (48 - 103)  ABP: 100/37 (21 May 2024 02:30) (74/30 - 136/60)  ABP(mean): 59 (21 May 2024 02:30) (44 - 90)  RR: 13 (21 May 2024 02:30) (11 - 22)  SpO2: 99% (21 May 2024 02:30) (95% - 100%)    O2 Parameters below as of 20 May 2024 20:00  Patient On (Oxygen Delivery Method): room air      I&O's Summary    20 May 2024 07:01  -  21 May 2024 02:32  --------------------------------------------------------  IN: 840 mL / OUT: 560 mL / NET: 280 mL      --------------------------------------------------------------------------------------    EXAM:  General/Neuro  Exam: Patient remains lethargic    Respiratory  Exam: Lungs clear to auscultation, Normal expansion/effort. RA    Cardiovascular  Exam: S1, S2.  Regular rate and rhythm.   Cardiac Rhythm: Normal Sinus Rhythm    GI  Exam: Abdomen soft, Non-tender, Non-distended. End Ileostomy.   Current Diet:  NPO    Extremities  Exam: Extremities warm, pink, well-perfused.      Derm:  Exam: Good skin turgor, no skin breakdown.      :   Exam: Rogers catheter in place.     Tubes/Lines/Drains  ***  [x] Peripheral IV  [] Central Venous Line     	[] R	[] L	[] IJ	[] Fem	[] SC        Type:	    Date Placed:   [] Arterial Line		[] R	[] L	[] Fem	[] Rad	[] Ax	Date Placed:   [] PICC:         	[] Midline		[] Mediport           [] Urinary Catheter		Date Placed:     LABS  --------------------------------------------------------------------------------------  Labs:  CAPILLARY BLOOD GLUCOSE      POCT Blood Glucose.: 103 mg/dL (20 May 2024 10:38)                          8.6    9.63  )-----------( 197      ( 20 May 2024 23:00 )             25.9       Auto Neutrophil %: 85.6 % (05-20-24 @ 23:00)  Auto Immature Granulocyte %: 0.3 % (05-20-24 @ 23:00)    05-21    140  |  107  |  11  ----------------------------<  96  4.1   |  24  |  0.63      Calcium: 7.9 mg/dL (05-21-24 @ 00:13)      LFTs:             5.5  | 0.8  | 16       ------------------[45      ( 20 May 2024 18:50 )  3.0  | 0.2  | 8           Lipase:x      Amylase:x         Blood Gas Arterial, Lactate: 0.5 mmol/L (05-20-24 @ 22:37)  Blood Gas Arterial, Lactate: 0.7 mmol/L (05-20-24 @ 17:19)  Blood Gas Arterial, Lactate: 0.7 mmol/L (05-20-24 @ 15:55)    ABG - ( 20 May 2024 22:37 )  pH: 7.33  /  pCO2: 46    /  pO2: 110   / HCO3: 24    / Base Excess: -1.7  /  SaO2: 98.2      ABG - ( 20 May 2024 17:19 )  pH: 7.40  /  pCO2: 39    /  pO2: 225   / HCO3: 24    / Base Excess: -0.5  /  SaO2: 99.9      ABG - ( 20 May 2024 15:55 )  pH: 7.35  /  pCO2: 48    /  pO2: 201   / HCO3: 26    / Base Excess: 0.5   /  SaO2: 99.6        Coags:     11.3   ----< 1.00    ( 20 May 2024 18:50 )     29.5        Urinalysis Basic - ( 21 May 2024 00:13 )    Color: x / Appearance: x / SG: x / pH: x  Gluc: 96 mg/dL / Ketone: x  / Bili: x / Urobili: x   Blood: x / Protein: x / Nitrite: x   Leuk Esterase: x / RBC: x / WBC x   Sq Epi: x / Non Sq Epi: x / Bacteria: x          --------------------------------------------------------------------------------------    OTHER LABS    IMAGING RESULTS

## 2024-05-21 NOTE — PROGRESS NOTE ADULT - SUBJECTIVE AND OBJECTIVE BOX
TEAM [ A ] Surgery Daily Progress Note  =====================================================    SUBJECTIVE: Patient seen and examined at bedside on AM rounds. Pt. is sleeping but arousable to  pain.  NPO.     ALLERGIES:  nebivolol (Other)      --------------------------------------------------------------------------------------    MEDICATIONS:    Neurologic Medications  escitalopram 5 milliGRAM(s) Oral daily  oxyCODONE    IR 2.5 milliGRAM(s) Oral every 4 hours PRN Moderate Pain (4 - 6)  oxyCODONE    IR 5 milliGRAM(s) Oral every 4 hours PRN Severe Pain (7 - 10)    Respiratory Medications  loratadine 10 milliGRAM(s) Oral daily    Cardiovascular Medications  phenylephrine    Infusion 0.1 MICROgram(s)/kG/Min IV Continuous <Continuous>    Gastrointestinal Medications  lactated ringers. 1000 milliLiter(s) IV Continuous <Continuous>    Genitourinary Medications    Hematologic/Oncologic Medications  heparin   Injectable 5000 Unit(s) SubCutaneous every 8 hours    Antimicrobial/Immunologic Medications    Endocrine/Metabolic Medications  levothyroxine 50 MICROGram(s) Oral daily    Topical/Other Medications  chlorhexidine 2% Cloths 1 Application(s) Topical daily    --------------------------------------------------------------------------------------    VITAL SIGNS:  T(C): 36.4 (05-21-24 @ 04:00), Max: 37 (05-21-24 @ 03:05)  HR: 58 (05-21-24 @ 07:00) (52 - 76)  BP: 91/48 (05-21-24 @ 06:27) (85/35 - 132/50)  RR: 19 (05-21-24 @ 07:00) (8 - 28)  SpO2: 95% (05-21-24 @ 07:00) (84% - 100%)  --------------------------------------------------------------------------------------    EXAM    General: NAD, resting in bed comfortably.  Cardiac: regular rate, warm and well perfused  Respiratory: Nonlabored respirations, normal cw expansion.  Abdomen: soft, nontender, nondistended. Ostomy +   Extremities: contracted    --------------------------------------------------------------------------------------    LABS                        9.3    12.18 )-----------( 256      ( 21 May 2024 03:28 )             28.9       --------------------------------------------------------------------------------------  05-21    139  |  107  |  10  ----------------------------<  89  4.1   |  24  |  0.63    Ca    8.1<L>      21 May 2024 03:28  Phos  3.6     05-21  Mg     2.20     05-21    TPro  5.5<L>  /  Alb  3.0<L>  /  TBili  0.8  /  DBili  0.2  /  AST  16  /  ALT  8   /  AlkPhos  45  05-20    INS AND OUTS:    05-20-24 @ 07:01  -  05-21-24 @ 07:00  --------------------------------------------------------  IN: 1230 mL / OUT: 1185 mL / NET: 45 mL      --------------------------------------------------------------------------------------

## 2024-05-21 NOTE — PATIENT PROFILE ADULT - FALL HARM RISK - HARM RISK INTERVENTIONS
Assistance with ambulation/Assistance OOB with selected safe patient handling equipment/Communicate Risk of Fall with Harm to all staff/Discuss with provider need for PT consult/Monitor gait and stability/Provide patient with walking aids - walker, cane, crutches/Reinforce activity limits and safety measures with patient and family/Sit up slowly, dangle for a short time, stand at bedside before walking/Tailored Fall Risk Interventions/Use of alarms - bed, chair and/or voice tab/Visual Cue: Yellow wristband and red socks/Bed in lowest position, wheels locked, appropriate side rails in place/Call bell, personal items and telephone in reach/Instruct patient to call for assistance before getting out of bed or chair/Non-slip footwear when patient is out of bed/[place] to call system/Physically safe environment - no spills, clutter or unnecessary equipment/Purposeful Proactive Rounding/Room/bathroom lighting operational, light cord in reach

## 2024-05-21 NOTE — PHYSICAL THERAPY INITIAL EVALUATION ADULT - ADDITIONAL COMMENTS
Patient poor historian, social history taken from son bedside. Patient presents from assisted living where she is able to ambulate with walker. patient is able to transfer independent into wheelchair from bed at assisted living. Patient gets assist with ADL.

## 2024-05-21 NOTE — PHYSICAL THERAPY INITIAL EVALUATION ADULT - MD ORDER
Primary Surgeon	Julio Chung (Attending)  First Assist	Resident/Fellow  First Assist Name	Lian Ervin (Resident)

## 2024-05-21 NOTE — CONSULT NOTE ADULT - ASSESSMENT
ASSESSMENT:  87 y/o female with pmhx of HTN, hypothyroidism, osteoporosis, hx of UTI, kidney stones, septic shock s/p cystoscopy, stone extraction and remote hx of subdural hematoma s/p craniotomy and SBO. 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 Of note, cecal mass noted to be dense and adhered to pelvic sidewall. Small nodularity in mesentery biopsies, negative for malignancy on frozen. Palpable mobile mass in sigmoid with tattoo palpable. SICU consulted for hemodynamic monitoring.      PLAN  NEUROLOGIC   - Pain control with tylenol and oxycodone prn  - home Lexapro    RESPIRATORY   - Currently on RA  - Monitor SpO2 goal >92%    CARDIOVASCULAR   - Monitor hemodynamics   - MAP goal > 65  - wean artemio as tolerated    GASTROINTESTINAL   - Diet: CLD     /RENAL   - IV fluids: LR @ 40cc/hr  - Monitor BMP  - Strict I/Os  - Monitor electrolytes, replete PRN  - Maintain arreguin catheter    HEMATOLOGIC  - Monitor H/H   - DVT prophylaxis: SCDs & subq heparin    INFECTIOUS DISEASE  - Monitor fever/WC    ENDOCRINE  - Monitor gluc  - home levothyroxine    LINES  - rad a line  - PIV  - Arreguin    Disposition:   SICU  --------------------------------------------------------------------------------------    Critical Care Diagnoses:

## 2024-05-21 NOTE — CHART NOTE - NSCHARTNOTEFT_GEN_A_CORE
: Chava Coleman, PGY1    INDICATION: Assess volume status     PROCEDURE:  [X] LIMITED ECHO  [X] LIMITED CHEST  [ ] LIMITED RETROPERITONEAL  [ ] LIMITED ABDOMINAL  [ ] LIMITED DVT  [ ] NEEDLE GUIDANCE VASCULAR  [ ] NEEDLE GUIDANCE THORACENTESIS  [ ] NEEDLE GUIDANCE PARACENTESIS  [ ] NEEDLE GUIDANCE PERICARDIOCENTESIS  [ ] OTHER    FINDINGS:  Echo  LV systolic function appears preserved   No pericardial effusion   IVC measures 1.5 cm with inspiratory variability    Chest  A line predominant in bilateral lungs   Small pleural effusion at bilateral lung bases     INTERPRETATION: Patient appears hypo to euvolemic as patient is A line predominant with an IVC measuring 1.5 cm. : Chava Coleman, PGY1    INDICATION: Assess volume status     PROCEDURE:  [X] LIMITED ECHO  [X] LIMITED CHEST  [ ] LIMITED RETROPERITONEAL  [ ] LIMITED ABDOMINAL  [ ] LIMITED DVT  [ ] NEEDLE GUIDANCE VASCULAR  [ ] NEEDLE GUIDANCE THORACENTESIS  [ ] NEEDLE GUIDANCE PARACENTESIS  [ ] NEEDLE GUIDANCE PERICARDIOCENTESIS  [ ] OTHER    FINDINGS:  Echo  LV systolic function appears preserved   No pericardial effusion   IVC measures 1.5 cm with inspiratory variability    Chest  A line predominant in bilateral lungs   Small pleural effusion at bilateral lung bases     INTERPRETATION: Patient appears hypo to euvolemic as patient is A line predominant with an IVC measuring 1.5 cm.  Agree.

## 2024-05-21 NOTE — PROGRESS NOTE ADULT - SUBJECTIVE AND OBJECTIVE BOX
POST ANESTHESIA EVALUATION    88y Female POSTOP DAY 1 S/P     MENTAL STATUS: Patient participation [ x ] Awake     [  ] Arousable     [  ] Sedated    AIRWAY PATENCY: [ x ] Satisfactory  [  ] Other:     Vital Signs Last 24 Hrs  T(C): 36.7 (21 May 2024 08:00), Max: 37 (21 May 2024 03:05)  T(F): 98 (21 May 2024 08:00), Max: 98.6 (21 May 2024 03:05)  HR: 60 (21 May 2024 10:00) (52 - 76)  BP: 144/59 (21 May 2024 08:40) (85/35 - 172/111)  BP(mean): 75 (21 May 2024 08:40) (48 - 130)  RR: 14 (21 May 2024 10:00) (8 - 28)  SpO2: 100% (21 May 2024 10:00) (84% - 100%)    Parameters below as of 21 May 2024 08:00  Patient On (Oxygen Delivery Method): room air      I&O's Summary    20 May 2024 07:01  -  21 May 2024 07:00  --------------------------------------------------------  IN: 1245.9 mL / OUT: 1185 mL / NET: 60.9 mL          NAUSEA/ VOMITTING:  [x  ] NONE  [  ] CONTROLLED [  ] OTHER     PAIN: [ x ] CONTROLLED WITH CURRENT REGIMEN  [  ] OTHER    [x  ] NO APPARENT ANESTHESIA COMPLICATIONS      Comments:

## 2024-05-21 NOTE — PROGRESS NOTE ADULT - ASSESSMENT
89 y/o female with pmhx of HTN, hypothyroidism, osteoporosis, hx of UTI, kidney stones, septic shock s/p cystoscopy, stone extraction and remote hx of subdural hematoma s/p craniotomy and SBO. 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 Of note, cecal mass noted to be dense and adhered to pelvic sidewall. Small nodularity in mesentery biopsies, negative for malignancy on frozen. Palpable mobile mass in sigmoid with tattoo palpable. Pt. is in SICU consulted for hemodynamic monitoring.    -Appreciate SICU monitor and management  -Rec. Dysphagia screen when patient is less somolence  -Monitor fluid status    Surgery A team  [id number]

## 2024-05-22 LAB
ANION GAP SERPL CALC-SCNC: 11 MMOL/L — SIGNIFICANT CHANGE UP (ref 7–14)
BUN SERPL-MCNC: 8 MG/DL — SIGNIFICANT CHANGE UP (ref 7–23)
CALCIUM SERPL-MCNC: 8.4 MG/DL — SIGNIFICANT CHANGE UP (ref 8.4–10.5)
CHLORIDE SERPL-SCNC: 107 MMOL/L — SIGNIFICANT CHANGE UP (ref 98–107)
CO2 SERPL-SCNC: 22 MMOL/L — SIGNIFICANT CHANGE UP (ref 22–31)
CREAT SERPL-MCNC: 0.58 MG/DL — SIGNIFICANT CHANGE UP (ref 0.5–1.3)
EGFR: 88 ML/MIN/1.73M2 — SIGNIFICANT CHANGE UP
GLUCOSE BLDC GLUCOMTR-MCNC: 119 MG/DL — HIGH (ref 70–99)
GLUCOSE BLDC GLUCOMTR-MCNC: 130 MG/DL — HIGH (ref 70–99)
GLUCOSE BLDC GLUCOMTR-MCNC: 197 MG/DL — HIGH (ref 70–99)
GLUCOSE BLDC GLUCOMTR-MCNC: 69 MG/DL — LOW (ref 70–99)
GLUCOSE BLDC GLUCOMTR-MCNC: 74 MG/DL — SIGNIFICANT CHANGE UP (ref 70–99)
GLUCOSE BLDC GLUCOMTR-MCNC: 74 MG/DL — SIGNIFICANT CHANGE UP (ref 70–99)
GLUCOSE BLDC GLUCOMTR-MCNC: 92 MG/DL — SIGNIFICANT CHANGE UP (ref 70–99)
GLUCOSE SERPL-MCNC: 68 MG/DL — LOW (ref 70–99)
HCT VFR BLD CALC: 28.8 % — LOW (ref 34.5–45)
HGB BLD-MCNC: 9.7 G/DL — LOW (ref 11.5–15.5)
MAGNESIUM SERPL-MCNC: 1.9 MG/DL — SIGNIFICANT CHANGE UP (ref 1.6–2.6)
MCHC RBC-ENTMCNC: 30.2 PG — SIGNIFICANT CHANGE UP (ref 27–34)
MCHC RBC-ENTMCNC: 33.7 GM/DL — SIGNIFICANT CHANGE UP (ref 32–36)
MCV RBC AUTO: 89.7 FL — SIGNIFICANT CHANGE UP (ref 80–100)
MRSA PCR RESULT.: SIGNIFICANT CHANGE UP
NRBC # BLD: 0 /100 WBCS — SIGNIFICANT CHANGE UP (ref 0–0)
NRBC # FLD: 0 K/UL — SIGNIFICANT CHANGE UP (ref 0–0)
PHOSPHATE SERPL-MCNC: 2.6 MG/DL — SIGNIFICANT CHANGE UP (ref 2.5–4.5)
PLATELET # BLD AUTO: 232 K/UL — SIGNIFICANT CHANGE UP (ref 150–400)
POTASSIUM SERPL-MCNC: 4.1 MMOL/L — SIGNIFICANT CHANGE UP (ref 3.5–5.3)
POTASSIUM SERPL-SCNC: 4.1 MMOL/L — SIGNIFICANT CHANGE UP (ref 3.5–5.3)
RBC # BLD: 3.21 M/UL — LOW (ref 3.8–5.2)
RBC # FLD: 15.4 % — HIGH (ref 10.3–14.5)
S AUREUS DNA NOSE QL NAA+PROBE: SIGNIFICANT CHANGE UP
SODIUM SERPL-SCNC: 140 MMOL/L — SIGNIFICANT CHANGE UP (ref 135–145)
WBC # BLD: 8.72 K/UL — SIGNIFICANT CHANGE UP (ref 3.8–10.5)
WBC # FLD AUTO: 8.72 K/UL — SIGNIFICANT CHANGE UP (ref 3.8–10.5)

## 2024-05-22 PROCEDURE — 99233 SBSQ HOSP IP/OBS HIGH 50: CPT

## 2024-05-22 PROCEDURE — 76604 US EXAM CHEST: CPT | Mod: 26

## 2024-05-22 RX ORDER — SODIUM CHLORIDE 9 MG/ML
1000 INJECTION, SOLUTION INTRAVENOUS
Refills: 0 | Status: DISCONTINUED | OUTPATIENT
Start: 2024-05-22 | End: 2024-05-22

## 2024-05-22 RX ORDER — DEXTROSE 50 % IN WATER 50 %
50 SYRINGE (ML) INTRAVENOUS ONCE
Refills: 0 | Status: COMPLETED | OUTPATIENT
Start: 2024-05-22 | End: 2024-05-22

## 2024-05-22 RX ORDER — POTASSIUM PHOSPHATE, MONOBASIC POTASSIUM PHOSPHATE, DIBASIC 236; 224 MG/ML; MG/ML
30 INJECTION, SOLUTION INTRAVENOUS ONCE
Refills: 0 | Status: COMPLETED | OUTPATIENT
Start: 2024-05-22 | End: 2024-05-22

## 2024-05-22 RX ORDER — NEBIVOLOL HYDROCHLORIDE 5 MG/1
2.5 TABLET ORAL DAILY
Refills: 0 | Status: DISCONTINUED | OUTPATIENT
Start: 2024-05-22 | End: 2024-05-28

## 2024-05-22 RX ORDER — DEXTROSE 50 % IN WATER 50 %
25 SYRINGE (ML) INTRAVENOUS ONCE
Refills: 0 | Status: COMPLETED | OUTPATIENT
Start: 2024-05-22 | End: 2024-05-22

## 2024-05-22 RX ORDER — ACETAMINOPHEN 500 MG
1000 TABLET ORAL EVERY 6 HOURS
Refills: 0 | Status: DISCONTINUED | OUTPATIENT
Start: 2024-05-22 | End: 2024-05-23

## 2024-05-22 RX ADMIN — Medication 1000 MILLIGRAM(S): at 11:42

## 2024-05-22 RX ADMIN — Medication 50 MILLILITER(S): at 01:46

## 2024-05-22 RX ADMIN — ESCITALOPRAM OXALATE 5 MILLIGRAM(S): 10 TABLET, FILM COATED ORAL at 11:41

## 2024-05-22 RX ADMIN — Medication 50 MICROGRAM(S): at 05:06

## 2024-05-22 RX ADMIN — LORATADINE 10 MILLIGRAM(S): 10 TABLET ORAL at 11:41

## 2024-05-22 RX ADMIN — HEPARIN SODIUM 5000 UNIT(S): 5000 INJECTION INTRAVENOUS; SUBCUTANEOUS at 05:06

## 2024-05-22 RX ADMIN — Medication 400 MILLIGRAM(S): at 05:06

## 2024-05-22 RX ADMIN — Medication 25 MILLILITER(S): at 06:41

## 2024-05-22 RX ADMIN — SODIUM CHLORIDE 75 MILLILITER(S): 9 INJECTION, SOLUTION INTRAVENOUS at 01:46

## 2024-05-22 RX ADMIN — HEPARIN SODIUM 5000 UNIT(S): 5000 INJECTION INTRAVENOUS; SUBCUTANEOUS at 14:03

## 2024-05-22 RX ADMIN — CHLORHEXIDINE GLUCONATE 1 APPLICATION(S): 213 SOLUTION TOPICAL at 11:42

## 2024-05-22 RX ADMIN — POTASSIUM PHOSPHATE, MONOBASIC POTASSIUM PHOSPHATE, DIBASIC 83.33 MILLIMOLE(S): 236; 224 INJECTION, SOLUTION INTRAVENOUS at 03:51

## 2024-05-22 NOTE — ADVANCED PRACTICE NURSE CONSULT - RECOMMEDATIONS
Supplies utilized:   Liquid barrier film    Skin barrier ring- item # [id number]  Flat wafer (2 1/4")- item # [id number]   Two piece High output pouch (2 1/4")- item # [id number]     Ostomy team will continue to follow.   Please contact Wound/Ostomy Care Service Line if we can be of further assistance (ext [phone]).

## 2024-05-22 NOTE — DIETITIAN INITIAL EVALUATION ADULT - SIGNS/SYMPTOMS
moderate subcutaneous fat store depletion with moderate muscle mass wasting pt s/p colectomy with end ileostomy placement

## 2024-05-22 NOTE — DIETITIAN INITIAL EVALUATION ADULT - PERTINENT LABORATORY DATA
05-22    140  |  107  |  8   ----------------------------<  68<L>  4.1   |  22  |  0.58    Ca    8.4      22 May 2024 01:21  Phos  2.6     05-22  Mg     1.90     05-22    TPro  5.5<L>  /  Alb  3.0<L>  /  TBili  0.8  /  DBili  0.2  /  AST  16  /  ALT  8   /  AlkPhos  45  05-20  POCT Blood Glucose.: 119 mg/dL (05-22-24 @ 07:18)  A1C with Estimated Average Glucose Result: 5.0 % (05-16-24 @ 11:20)

## 2024-05-22 NOTE — PROGRESS NOTE ADULT - ASSESSMENT
ASSESSMENT:  89 y/o female with pmhx of HTN, hypothyroidism, osteoporosis, hx of UTI, kidney stones, septic shock s/p cystoscopy, stone extraction and remote hx of subdural hematoma s/p craniotomy and SBO. 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 Of note, cecal mass noted to be dense and adhered to pelvic sidewall. Small nodularity in mesentery biopsies, negative for malignancy on frozen. Palpable mobile mass in sigmoid with tattoo palpable. SICU consulted for hemodynamic monitoring.    PLAN  NEUROLOGIC   - Pain control with tylenol   - home Lexapro    RESPIRATORY   - Currently on RA  - Monitor SpO2 goal >92%    CARDIOVASCULAR   - Monitor hemodynamics   - MAP goal > 65    GASTROINTESTINAL   - Diet: CLD, advance as tolerated    /RENAL   - IV fluids: LR @ 75cc/hr, IVL once fully tolerating PO   - Monitor BMP  - Strict I/Os  - Monitor electrolytes, replete PRN  - Maintain arreguin catheter    HEMATOLOGIC  - Monitor H/H   - DVT prophylaxis: SCDs & subq heparin    INFECTIOUS DISEASE  - Monitor fever/WC    ENDOCRINE  - Monitor gluc  - home levothyroxine    LINES  - PIV onl,y     Disposition:  SICU    --------------------------------------------------------------------------------------    Critical Care Diagnoses:

## 2024-05-22 NOTE — DIETITIAN INITIAL EVALUATION ADULT - OTHER INFO
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 Visited with pt to obtain nutrition hx. Pt is a little confused and was unable to provide appreciable nutrition hx. She said she ate well at the facility she came from. [facility]. She was just advanced to [employer] and was drinking juice during encounter. Ileostomy output over the past 24 hrs 175 ml. Pt said she is not having any GI distress at present. She said she doesn't know her wt therefore was unable to clarify wt status. Per nutrition focused physical exam, pt exhibited moderate temporal muscle and clavicular muscle mass wasting along with moderate triceps subcutaneous fat store depletion. These findings present pt at moderate malnutrition given her recent dx of digestive ca with hospitalization in March 2024 with ongoing issues as presented in H&P. Recommend providing Ensure Clear 3x daily (540 huma and 24 gm protein) with current diet order. Suggest advancing diet to Low Fiber, as tolerated, with consideration of Ensure HP 2x daily (700 huma and 40 gm protein) as oral supplementation for diet optimization. Encourage and monitor oral intake, especially of nutrition supplement. RDN services to remain available as needed.

## 2024-05-22 NOTE — DIETITIAN INITIAL EVALUATION ADULT - PERTINENT MEDS FT
MEDICATIONS  (STANDING):  acetaminophen     Tablet .. 1000 milliGRAM(s) Oral every 6 hours  chlorhexidine 2% Cloths 1 Application(s) Topical daily  dextrose 5% + sodium chloride 0.45%. 1000 milliLiter(s) (40 mL/Hr) IV Continuous <Continuous>  escitalopram 5 milliGRAM(s) Oral daily  heparin   Injectable 5000 Unit(s) SubCutaneous every 8 hours  levothyroxine 50 MICROGram(s) Oral daily  loratadine 10 milliGRAM(s) Oral daily    MEDICATIONS  (PRN):

## 2024-05-22 NOTE — PROVIDER CONTACT NOTE (HYPOGLYCEMIA EVENT) - NS PROVIDER CONTACT RECOMMEND-HYPO
MD Palacios made aware of hypoglycemic event, D50 ordered and given. Glucose reassessed, safety maintained.

## 2024-05-22 NOTE — CHART NOTE - NSCHARTNOTEFT_GEN_A_CORE
: Chava oCleman, PGY1    INDICATION: Assess volume status    PROCEDURE:  [X] LIMITED ECHO  [X] LIMITED CHEST  [ ] LIMITED RETROPERITONEAL  [ ] LIMITED ABDOMINAL  [ ] LIMITED DVT  [ ] NEEDLE GUIDANCE VASCULAR  [ ] NEEDLE GUIDANCE THORACENTESIS  [ ] NEEDLE GUIDANCE PARACENTESIS  [ ] NEEDLE GUIDANCE PERICARDIOCENTESIS  [ ] OTHER    FINDINGS:  Echo  LV systolic function appears normal, however small area of hypokinesis posterior LV wall   No pericardial effusion  Unable to evaluate IVC as patient declined optimal supine positioning     Chest  A line predominant bilaterally with some B lines at the R lung base  Small effusion R lung base     INTERPRETATION: Small effusion R lung base and patient appears euvolemic. Posterior LV wall with small area of hypokinesis however overall LV systolic function appears preserved : Chava Coleman, PGY1    INDICATION: Assess volume status    PROCEDURE:  [X] LIMITED ECHO  [X] LIMITED CHEST  [ ] LIMITED RETROPERITONEAL  [ ] LIMITED ABDOMINAL  [ ] LIMITED DVT  [ ] NEEDLE GUIDANCE VASCULAR  [ ] NEEDLE GUIDANCE THORACENTESIS  [ ] NEEDLE GUIDANCE PARACENTESIS  [ ] NEEDLE GUIDANCE PERICARDIOCENTESIS  [ ] OTHER    FINDINGS:  Echo  LV systolic function appears normal, however small area of hypokinesis posterior LV wall   No pericardial effusion  Unable to evaluate IVC as patient declined optimal supine positioning     Chest  A line predominant bilaterally with some B lines at the R lung base  Small effusion R lung base     INTERPRETATION: Small effusion R lung base and patient appears euvolemic. Posterior LV wall with small area of hypokinesis however overall LV systolic function appears preserved. Unable to find prior formal TTE to compare, however patient not complaining of acute chest pain during exam so low concern for an acute cardiac event. : Chava Colemna, PGY1    INDICATION: Assess volume status    PROCEDURE:  [X] LIMITED ECHO  [X] LIMITED CHEST  [ ] LIMITED RETROPERITONEAL  [ ] LIMITED ABDOMINAL  [ ] LIMITED DVT  [ ] NEEDLE GUIDANCE VASCULAR  [ ] NEEDLE GUIDANCE THORACENTESIS  [ ] NEEDLE GUIDANCE PARACENTESIS  [ ] NEEDLE GUIDANCE PERICARDIOCENTESIS  [ ] OTHER    FINDINGS:  Echo  LV systolic function appears normal, however small area of hypokinesis posterior LV wall   No pericardial effusion  Unable to evaluate IVC as patient declined optimal supine positioning     Chest  A line predominant bilaterally with some B lines at the R lung base  Small effusion R lung base     INTERPRETATION: Small effusion R lung base and patient appears euvolemic. Posterior LV wall with small area of hypokinesis however overall LV systolic function appears preserved. Unable to locate prior formal TTE for comparison, however patient not complaining of acute chest pain during exam so low concern for an acute cardiac event. : Chava Coleman, PGY1    INDICATION: Assess volume status    PROCEDURE:  [X] LIMITED ECHO  [X] LIMITED CHEST  [ ] LIMITED RETROPERITONEAL  [ ] LIMITED ABDOMINAL  [ ] LIMITED DVT  [ ] NEEDLE GUIDANCE VASCULAR  [ ] NEEDLE GUIDANCE THORACENTESIS  [ ] NEEDLE GUIDANCE PARACENTESIS  [ ] NEEDLE GUIDANCE PERICARDIOCENTESIS  [ ] OTHER    FINDINGS:  Echo  LV systolic function appears normal, however small area of hypokinesis posterior LV wall   No pericardial effusion  Unable to evaluate IVC as patient declined optimal supine positioning     Chest  A line predominant bilaterally with some B lines at the R lung base  Small effusion R lung base     INTERPRETATION: Small effusion R lung base and patient appears euvolemic. Posterior LV wall with small area of hypokinesis however overall LV systolic function appears preserved. Unable to locate prior formal TTE for comparison, however patient not complaining of acute chest pain during exam so low concern for an acute cardiac event.  Agree.

## 2024-05-22 NOTE — PROGRESS NOTE ADULT - ATTENDING COMMENTS
I agree with the detailed interval history, physical, and plan, which I have reviewed and edited where appropriate'; also agree with notes/assessment with my team on service.  I have personally examined the patient.  I was physically present for the key portions of the evaluation and management (E/M) service provided.  I reviewed all the pertinent data.  The patient is a critical care patient with life threatening hemodynamic and metabolic instability in SICU.  The SICU team has a constant risk benefit analyzes discussion and coordinating care with the primary team and all consultants.   The patient is in SICU with the chief complaint and diagnosis mentioned in the note.   The plan will be specified in the note.  89 y/o female s/p colectomy with end ileostomy with post op hypotension in SICU for hemodynamic monitoring.  EXAM:  General/Neuro  Exam: lethargic  Respiratory  Exam: Lungs clear   Cardiovascular  Exam: RR  GI  Exam: Abdomen soft  Extremities  Exam: Extremities warm    PLAN  NEUROLOGIC   - tylenol  -oxycodone   - Lexapro  RESPIRATORY   - RA  CARDIOVASCULAR   - MAP goal > 65  GASTROINTESTINAL   - Diet: CLD   /RENAL   - IV fluids: IVlock  HEMATOLOGIC  -SQH  INFECTIOUS DISEASE  - Monitor fever  ENDOCRINE  - Monitor glucose  Disposition:   DC floor

## 2024-05-22 NOTE — PROGRESS NOTE ADULT - SUBJECTIVE AND OBJECTIVE BOX
TEAM [ A ] Surgery Daily Progress Note  =====================================================    SUBJECTIVE: Patient seen and examined at bedside on AM rounds. Patient reports feeling well, pain controlled on current regimen. denies nausea, vomiting.    OOB/Amublating as tolerated. Denies fever, chills.    ALLERGIES:  nebivolol (Other)      --------------------------------------------------------------------------------------    MEDICATIONS:    Neurologic Medications  escitalopram 5 milliGRAM(s) Oral daily    Respiratory Medications  loratadine 10 milliGRAM(s) Oral daily    Cardiovascular Medications  phenylephrine    Infusion 0.1 MICROgram(s)/kG/Min IV Continuous <Continuous>    Gastrointestinal Medications  dextrose 5% + sodium chloride 0.45%. 1000 milliLiter(s) IV Continuous <Continuous>    Genitourinary Medications    Hematologic/Oncologic Medications  heparin   Injectable 5000 Unit(s) SubCutaneous every 8 hours    Antimicrobial/Immunologic Medications    Endocrine/Metabolic Medications  levothyroxine 50 MICROGram(s) Oral daily    Topical/Other Medications  chlorhexidine 2% Cloths 1 Application(s) Topical daily    --------------------------------------------------------------------------------------    VITAL SIGNS:  T(C): 36.2 (05-22-24 @ 04:00), Max: 37.1 (05-21-24 @ 12:00)  HR: 66 (05-22-24 @ 06:00) (56 - 76)  BP: 130/56 (05-22-24 @ 06:00) (90/43 - 144/59)  RR: 20 (05-22-24 @ 06:00) (10 - 24)  SpO2: 99% (05-22-24 @ 06:00) (94% - 100%)  --------------------------------------------------------------------------------------    EXAM    General: NAD, resting in bed comfortably.  Cardiac:  warm and well perfused  Respiratory: Nonlabored respirations, normal cw expansion.  Abdomen: soft, nontender, nondistended.  incision is c/d/i. R  ostomy +  Extremities: normal strength, FROM, no deformities    --------------------------------------------------------------------------------------    LABS                        9.7    8.72  )-----------( 232      ( 22 May 2024 01:21 )             28.8   05-22    140  |  107  |  8   ----------------------------<  68<L>  4.1   |  22  |  0.58    Ca    8.4      22 May 2024 01:21  Phos  2.6     05-22  Mg     1.90     05-22    TPro  5.5<L>  /  Alb  3.0<L>  /  TBili  0.8  /  DBili  0.2  /  AST  16  /  ALT  8   /  AlkPhos  45  05-20      --------------------------------------------------------------------------------------    INS AND OUTS:    05-21-24 @ 07:01  -  05-22-24 @ 07:00  --------------------------------------------------------  IN: 3126.2 mL / OUT: 1515 mL / NET: 1611.2 mL      --------------------------------------------------------------------------------------

## 2024-05-22 NOTE — DIETITIAN INITIAL EVALUATION ADULT - ADD RECOMMEND
1. Provide encouragement with PO intake, menu selections, and assistance with meals as needed. Continue to monitor nutritional intake, labs, weights, BM, skin, clinical course. 2. Advance diet, as tolerated, to Low Fiber with Ensure HP 2x daily (700 huma and 40 gm protein). Follow pt as per protocol.

## 2024-05-22 NOTE — PROGRESS NOTE ADULT - ASSESSMENT
87 y/o female with pmhx of HTN, hypothyroidism, osteoporosis, hx of UTI, kidney stones, septic shock s/p cystoscopy, stone extraction and remote hx of subdural hematoma s/p craniotomy and SBO. 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 Of note, cecal mass noted to be dense and adhered to pelvic sidewall. Small nodularity in mesentery biopsies, negative for malignancy on frozen. Palpable mobile mass in sigmoid with tattoo palpable. Pt. is in SICU consulted for hemodynamic monitoring. Pt. doing better.     -Appreciate SICU monitor and management  -Clear for today   -Ostomy reaching  -Monitor fluid status    Surgery A team  [id number]

## 2024-05-22 NOTE — DIETITIAN INITIAL EVALUATION ADULT - ENTER TO (G/KG)
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

## 2024-05-22 NOTE — PROVIDER CONTACT NOTE (HYPOGLYCEMIA EVENT) - NS PROVIDER CONTACT BACKGROUND-HYPO
Age: 88y    Gender: Female    POCT Blood Glucose:  130 mg/dL (05-22-24 @ 03:58)  197 mg/dL (05-22-24 @ 02:11)  69 mg/dL (05-22-24 @ 01:16)  77 mg/dL (05-21-24 @ 17:28)      eMAR:dextrose 50% Injectable   50 milliLiter(s) IV Push (05-22-24 @ 01:46)    dextrose 50% Injectable   25 milliLiter(s) IV Push (05-21-24 @ 05:31)

## 2024-05-22 NOTE — DIETITIAN INITIAL EVALUATION ADULT - NSFNSGIIOFT_GEN_A_CORE
05-21-24 @ 07:01  -  05-22-24 @ 07:00  --------------------------------------------------------  OUT:    Ileostomy (mL): 175 mL  Total OUT: 175 mL    Total NET: -175 mL

## 2024-05-22 NOTE — PROVIDER CONTACT NOTE (HYPOGLYCEMIA EVENT) - NS PROVIDER CONTACT NOTE-NOTIFY DATE & TIME-HYPO
July 7, 2020      Jenna Washington MD  [address]  [facility] [address]           [facility]  [address]  [facility] [address]  Phone: [phone]          Patient: Feli Zamarripa   MR Number: [id number]   YOB: 1955   Date of Visit: 7/6/2020       Dear Dr. Jenna Washington:    Thank you for referring Feli Zamarripa to me for evaluation. Attached you will find relevant portions of my assessment and plan of care.    If you have questions, please do not hesitate to call me. I look forward to following Feli Zamarripa along with you.    Sincerely,    Gali Christianson PA-C    Enclosure  CC:  No Recipients    If you would like to receive this communication electronically, please contact [email or web address] or [phone] to request more information on [employer] access.    For providers and/or their staff who would like to refer a patient to [facility], please contact us through our one-stop-shop provider referral line, [facility], at [phone].    If you feel you have received this communication in error or would no longer like to receive these types of communications, please e-mail [email or web address]          22-May-2024 01:30

## 2024-05-22 NOTE — PROGRESS NOTE ADULT - SUBJECTIVE AND OBJECTIVE BOX
SICU Daily Progress Note  =====================================================  Interval/Overnight Events:    - narcotics discontinued  - off pressors  - passed bedside speech / swallow  - CLD started   - passed trial of void  - arterial line removed.     POD #    2   	SICU Day #    2    HPI:   87 y/o female with pmhx of HTN, hypothyroidism, osteoporosis, hx of UTI, kidney stones, septic shock s/p cystoscopy, stone extraction and remote hx of subdural hematoma s/p craniotomy and SBO presents with dx of Neoplasm of Digestive System. She was recently hospitalized at [facility] 3/20/24-3/28/24 with SBO. CT showed terminal ileum thickening, possible small bowel neoplasm with upstream fluid distention suggesting partial SBO. 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 Of note, cecal mass noted to be dense and adhered to pelvic sidewall. Small nodularity in mesentery biopsies, negative for malignancy on frozen. Palpable mobile mass in sigmoid with tattoo palpable. SICU consulted for hemodynamic monitoring.    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 (16 May 2024 11:14)    Surgery Information  OR time:      EBL:          IV Fluids:       Blood Products:   UOP:          PAST MEDICAL & SURGICAL HISTORY:  HTN (hypertension)  Hypothyroidism  Osteoporosis  Major depression  History of subdural hematoma  Neoplasm of digestive system  S/P cystoscopy  Status post craniotomy      Home Meds: Home Medications:  Bystolic 2.5 mg oral tablet: 1 tab(s) orally once a day am (20 May 2024 10:16)  levothyroxine 50 mcg (0.05 mg) oral tablet: 1 tab(s) orally once a day am (20 May 2024 10:16)  Lexapro 5 mg oral tablet: 1 tab(s) orally once a day (20 May 2024 10:16)  loratadine 10 mg oral tablet: 1 tab(s) orally once a day (20 May 2024 10:16)  Simethicone, 25 mg, PO, PRN:  (20 May 2024 10:16)    Allergies:  nebivolol (Other)    Soc:   Advanced Directives: Full Code       MEDICATIONS:   --------------------------------------------------------------------------------------  Neurologic Medications  acetaminophen   IVPB .. 1000 milliGRAM(s) IV Intermittent every 6 hours  escitalopram 5 milliGRAM(s) Oral daily    Respiratory Medications  loratadine 10 milliGRAM(s) Oral daily    Cardiovascular Medications  phenylephrine    Infusion 0.1 MICROgram(s)/kG/Min IV Continuous <Continuous>    Gastrointestinal Medications  lactated ringers. 1000 milliLiter(s) IV Continuous <Continuous>    Genitourinary Medications    Hematologic/Oncologic Medications  heparin   Injectable 5000 Unit(s) SubCutaneous every 8 hours    Antimicrobial/Immunologic Medications    Endocrine/Metabolic Medications  levothyroxine 50 MICROGram(s) Oral daily    Topical/Other Medications  chlorhexidine 2% Cloths 1 Application(s) Topical daily    --------------------------------------------------------------------------------------    VITAL SIGNS, INS/OUTS (last 24 hours):  --------------------------------------------------------------------------------------  ((Insert SICU Vitals/Is+Os here))***    --------------------------------------------------------------------------------------    EXAM  NEUROLOGY  RASS:   	GCS:    Exam: Normal, NAD, less lethargic, oriented x 2-3, no focal deficits.     HEENT  Exam: Normocephalic, atraumatic, EOMI.      RESPIRATORY  Exam: Lungs clear to auscultation, Normal expansion/effort.     CARDIOVASCULAR  Exam: S1, S2.  Regular rate and rhythm.       GI/NUTRITION  Exam: Abdomen soft, Non-tender, Non-distended.    Wound:    Current Diet:  CLD     VASCULAR  Exam: Extremities warm, pink, well-perfused.     MUSCULOSKELETAL  Exam: All extremities moving spontaneously without limitations.     SKIN  Exam: Good skin turgor, no skin breakdown.     METABOLIC/FLUIDS/ELECTROLYTES  lactated ringers. 1000 milliLiter(s) IV Continuous <Continuous>      HEMATOLOGIC  [x] VTE Prophylaxis: heparin   Injectable 5000 Unit(s) SubCutaneous every 8 hours    Transfusions:	[] PRBC	[] Platelets		[] FFP	[] Cryoprecipitate    INFECTIOUS DISEASE  Antimicrobials/Immunologic Medications:    Day #      of     ***    Tubes/Lines/Drains  ***  [x] Peripheral IV  [] Central Venous Line     	[] R	[] L	[] IJ	[] Fem	[] SC	Date Placed:   [] Arterial Line		[] R	[] L	[] Fem	[] Rad	[] Ax	Date Placed:   [] PICC		[] Midline		[] Mediport  [] Urinary Catheter		Date Placed:   [x] Necessity of urinary, arterial, and venous catheters discussed    LABS  --------------------------------------------------------------------------------------        --------------------------------------------------------------------------------------    OTHER LABORATORY:     IMAGING STUDIES:     CXR:  Pending     ----------------------------------------------------------------------------------------

## 2024-05-23 ENCOUNTER — TRANSCRIPTION ENCOUNTER (OUTPATIENT)
Age: 89
End: 2024-05-23

## 2024-05-23 LAB
ANION GAP SERPL CALC-SCNC: 13 MMOL/L — SIGNIFICANT CHANGE UP (ref 7–14)
BUN SERPL-MCNC: 8 MG/DL — SIGNIFICANT CHANGE UP (ref 7–23)
CALCIUM SERPL-MCNC: 9.3 MG/DL — SIGNIFICANT CHANGE UP (ref 8.4–10.5)
CHLORIDE SERPL-SCNC: 102 MMOL/L — SIGNIFICANT CHANGE UP (ref 98–107)
CO2 SERPL-SCNC: 23 MMOL/L — SIGNIFICANT CHANGE UP (ref 22–31)
CREAT SERPL-MCNC: 0.62 MG/DL — SIGNIFICANT CHANGE UP (ref 0.5–1.3)
EGFR: 87 ML/MIN/1.73M2 — SIGNIFICANT CHANGE UP
GLUCOSE SERPL-MCNC: 105 MG/DL — HIGH (ref 70–99)
HCT VFR BLD CALC: 32.2 % — LOW (ref 34.5–45)
HGB BLD-MCNC: 11.1 G/DL — LOW (ref 11.5–15.5)
MAGNESIUM SERPL-MCNC: 2 MG/DL — SIGNIFICANT CHANGE UP (ref 1.6–2.6)
MCHC RBC-ENTMCNC: 30.6 PG — SIGNIFICANT CHANGE UP (ref 27–34)
MCHC RBC-ENTMCNC: 34.5 GM/DL — SIGNIFICANT CHANGE UP (ref 32–36)
MCV RBC AUTO: 88.7 FL — SIGNIFICANT CHANGE UP (ref 80–100)
NRBC # BLD: 0 /100 WBCS — SIGNIFICANT CHANGE UP (ref 0–0)
NRBC # FLD: 0 K/UL — SIGNIFICANT CHANGE UP (ref 0–0)
PHOSPHATE SERPL-MCNC: 2.3 MG/DL — LOW (ref 2.5–4.5)
PLATELET # BLD AUTO: 317 K/UL — SIGNIFICANT CHANGE UP (ref 150–400)
POTASSIUM SERPL-MCNC: 3.8 MMOL/L — SIGNIFICANT CHANGE UP (ref 3.5–5.3)
POTASSIUM SERPL-SCNC: 3.8 MMOL/L — SIGNIFICANT CHANGE UP (ref 3.5–5.3)
RBC # BLD: 3.63 M/UL — LOW (ref 3.8–5.2)
RBC # FLD: 14.8 % — HIGH (ref 10.3–14.5)
SODIUM SERPL-SCNC: 138 MMOL/L — SIGNIFICANT CHANGE UP (ref 135–145)
WBC # BLD: 12.62 K/UL — HIGH (ref 3.8–10.5)
WBC # FLD AUTO: 12.62 K/UL — HIGH (ref 3.8–10.5)

## 2024-05-23 PROCEDURE — 71045 X-RAY EXAM CHEST 1 VIEW: CPT | Mod: 26

## 2024-05-23 RX ORDER — ONDANSETRON 8 MG/1
4 TABLET, FILM COATED ORAL ONCE
Refills: 0 | Status: COMPLETED | OUTPATIENT
Start: 2024-05-23 | End: 2024-05-23

## 2024-05-23 RX ORDER — ACETAMINOPHEN 500 MG
1000 TABLET ORAL EVERY 6 HOURS
Refills: 0 | Status: COMPLETED | OUTPATIENT
Start: 2024-05-23 | End: 2024-05-24

## 2024-05-23 RX ORDER — POTASSIUM CHLORIDE 20 MEQ
10 PACKET (EA) ORAL ONCE
Refills: 0 | Status: COMPLETED | OUTPATIENT
Start: 2024-05-23 | End: 2024-05-23

## 2024-05-23 RX ORDER — DEXTROSE MONOHYDRATE, SODIUM CHLORIDE, AND POTASSIUM CHLORIDE 50; .745; 4.5 G/1000ML; G/1000ML; G/1000ML
1000 INJECTION, SOLUTION INTRAVENOUS
Refills: 0 | Status: DISCONTINUED | OUTPATIENT
Start: 2024-05-23 | End: 2024-05-27

## 2024-05-23 RX ORDER — DEXTROSE MONOHYDRATE, SODIUM CHLORIDE, AND POTASSIUM CHLORIDE 50; .745; 4.5 G/1000ML; G/1000ML; G/1000ML
1000 INJECTION, SOLUTION INTRAVENOUS
Refills: 0 | Status: DISCONTINUED | OUTPATIENT
Start: 2024-05-23 | End: 2024-05-23

## 2024-05-23 RX ORDER — POTASSIUM CHLORIDE 20 MEQ
20 PACKET (EA) ORAL ONCE
Refills: 0 | Status: DISCONTINUED | OUTPATIENT
Start: 2024-05-23 | End: 2024-05-23

## 2024-05-23 RX ORDER — POTASSIUM PHOSPHATE, MONOBASIC POTASSIUM PHOSPHATE, DIBASIC 236; 224 MG/ML; MG/ML
15 INJECTION, SOLUTION INTRAVENOUS ONCE
Refills: 0 | Status: COMPLETED | OUTPATIENT
Start: 2024-05-23 | End: 2024-05-23

## 2024-05-23 RX ORDER — SODIUM CHLORIDE 9 MG/ML
500 INJECTION, SOLUTION INTRAVENOUS ONCE
Refills: 0 | Status: COMPLETED | OUTPATIENT
Start: 2024-05-23 | End: 2024-05-23

## 2024-05-23 RX ORDER — LEVOTHYROXINE SODIUM 125 MCG
38 TABLET ORAL
Refills: 0 | Status: DISCONTINUED | OUTPATIENT
Start: 2024-05-24 | End: 2024-05-27

## 2024-05-23 RX ORDER — LEVOTHYROXINE SODIUM 125 MCG
38 TABLET ORAL
Refills: 0 | Status: DISCONTINUED | OUTPATIENT
Start: 2024-05-23 | End: 2024-05-23

## 2024-05-23 RX ADMIN — DEXTROSE MONOHYDRATE, SODIUM CHLORIDE, AND POTASSIUM CHLORIDE 80 MILLILITER(S): 50; .745; 4.5 INJECTION, SOLUTION INTRAVENOUS at 19:07

## 2024-05-23 RX ADMIN — HEPARIN SODIUM 5000 UNIT(S): 5000 INJECTION INTRAVENOUS; SUBCUTANEOUS at 22:08

## 2024-05-23 RX ADMIN — Medication 400 MILLIGRAM(S): at 19:06

## 2024-05-23 RX ADMIN — Medication 100 MILLIEQUIVALENT(S): at 12:13

## 2024-05-23 RX ADMIN — SODIUM CHLORIDE 500 MILLILITER(S): 9 INJECTION, SOLUTION INTRAVENOUS at 14:43

## 2024-05-23 RX ADMIN — DEXTROSE MONOHYDRATE, SODIUM CHLORIDE, AND POTASSIUM CHLORIDE 40 MILLILITER(S): 50; .745; 4.5 INJECTION, SOLUTION INTRAVENOUS at 12:10

## 2024-05-23 RX ADMIN — POTASSIUM PHOSPHATE, MONOBASIC POTASSIUM PHOSPHATE, DIBASIC 62.5 MILLIMOLE(S): 236; 224 INJECTION, SOLUTION INTRAVENOUS at 13:00

## 2024-05-23 RX ADMIN — CHLORHEXIDINE GLUCONATE 1 APPLICATION(S): 213 SOLUTION TOPICAL at 13:04

## 2024-05-23 RX ADMIN — HEPARIN SODIUM 5000 UNIT(S): 5000 INJECTION INTRAVENOUS; SUBCUTANEOUS at 13:13

## 2024-05-23 RX ADMIN — ONDANSETRON 4 MILLIGRAM(S): 8 TABLET, FILM COATED ORAL at 06:45

## 2024-05-23 RX ADMIN — Medication 400 MILLIGRAM(S): at 11:21

## 2024-05-23 RX ADMIN — Medication 1000 MILLIGRAM(S): at 11:36

## 2024-05-23 RX ADMIN — HEPARIN SODIUM 5000 UNIT(S): 5000 INJECTION INTRAVENOUS; SUBCUTANEOUS at 06:59

## 2024-05-23 NOTE — PROGRESS NOTE ADULT - ASSESSMENT
89 y/o female with pmhx of HTN, hypothyroidism, osteoporosis, hx of UTI, kidney stones, septic shock s/p cystoscopy, stone extraction and remote hx of subdural hematoma s/p craniotomy and SBO. 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 Of note, cecal mass noted to be dense and adhered to pelvic sidewall. Small nodularity in mesentery biopsies, negative for malignancy on frozen. Palpable mobile mass in sigmoid with tattoo palpable. Pt downgraded from SICU (5/23).      Plan  - NPO  - HSQ  - IVF  - Speech and swallow eval   - Antiemetics PRN   - Tylenol PO

## 2024-05-23 NOTE — DISCHARGE NOTE PROVIDER - NSDCFUADDINST_GEN_ALL_CORE_FT
COLOSTOMY: You have a new ileostomy. Please document outputs every time colostomy is emptied and bring this documentation to your follow up appointment with your Surgeon. If the output becomes greater than 1 liter in 24 hours or becomes increasingly liquidy, or you become dehydrated/lightheaded etc, please call your Surgeons office.   WOUND CARE:  Please keep incisions clean and dry. Please do not Scrub or rub incisions. Do not use lotion or powder on incisions  BATHING: Please do not submerge wound underwater. You may shower and/or sponge bathe 48 hours after surgery.  ACTIVITY: No heavy lifting or straining. Otherwise, you may return to your usual level of physical activity. If you are taking narcotic pain medication (such as Oxycodone) DO NOT drive a car, operate machinery or make important decisions.  DIET: Return to your usual diet.  NOTIFY YOUR SURGEON IF: You have any bleeding that does not stop, any pus draining from your wound(s), any fever (over 100.4 F) or chills, persistent nausea/vomiting, persistent diarrhea, or if your pain is not controlled on your discharge pain medications.  FOLLOW-UP: Please follow up with your primary care physician in one week regarding your hospitalization.  Please follow up with your surgeon.  Call today for appointment in 1 week.   COLOSTOMY: You have a new ileostomy. Please document outputs every time colostomy is emptied and bring this documentation to your follow up appointment with your Surgeon. If the output becomes greater than 1 liter in 24 hours or becomes increasingly liquidy, or you become dehydrated/lightheaded etc, please call Dr. Chung's office for possible increase in your imodium dosing (etc).   IMODIUM: You have been prescribed Imodium 1 tablet daily for ostomy output. If your ostomy output greatly decreases in amount or becomes  increasingly hard or ostomy stops putting out, please call Dr. Chung's office to possibly stop the imodium dose.  WOUND CARE:  Please keep incisions clean and dry. Please do not Scrub or rub incisions. Do not use lotion or powder on incisions  BATHING: Please do not submerge wound underwater. You may shower and/or sponge bathe 48 hours after surgery.  ACTIVITY: No heavy lifting or straining. Otherwise, you may return to your usual level of physical activity. If you are taking narcotic pain medication (such as Oxycodone) DO NOT drive a car, operate machinery or make important decisions.  DIET: Low Fiber Diet.  NOTIFY YOUR SURGEON IF: You have any bleeding that does not stop, any pus draining from your wound(s), any fever (over 100.4 F) or chills, persistent nausea/vomiting, persistent diarrhea, or if your pain is not controlled on your discharge pain medications.  FOLLOW-UP: Please follow up with your primary care physician in one week regarding your hospitalization.  Please follow up with your surgeon.  Call today for appointment in 1 week.

## 2024-05-23 NOTE — DISCHARGE NOTE PROVIDER - NSDCMRMEDTOKEN_GEN_ALL_CORE_FT
Bystolic 2.5 mg oral tablet: 1 tab(s) orally once a day am  levothyroxine 50 mcg (0.05 mg) oral tablet: 1 tab(s) orally once a day am  Lexapro 5 mg oral tablet: 1 tab(s) orally once a day  loratadine 10 mg oral tablet: 1 tab(s) orally once a day  Simethicone, 25 mg, PO, PRN:    acetaminophen 325 mg oral tablet: 2 tab(s) orally every 6 hours as needed for  mild pain  Bystolic 2.5 mg oral tablet: 1 tab(s) orally once a day  levothyroxine 50 mcg (0.05 mg) oral tablet: 1 tab(s) orally once a day am  Lexapro 5 mg oral tablet: 1 tab(s) orally once a day  loperamide 2 mg oral tablet: 1 tab(s) orally once a day Hold for constipation (very decreased/thick or no ostomy output)  loratadine 10 mg oral tablet: 1 tab(s) orally once a day  Simethicone, 25 mg, PO, PRN:

## 2024-05-23 NOTE — ADVANCED PRACTICE NURSE CONSULT - ASSESSMENT
Patient AxOx2, ready and willing to learn. Actively participating in pouch change. Overview of surgical procedure and need of ostomy reinforced. Frequent reorientation with patient provided; patient noted to be forgetful at times and needing frequent re-orientation or verbalization of teachings. Terms defined utilized: Ostomy, Ileostomy, wafer, pouch. Frequency of pouch change and emptying discussed, teach back method utilized. Stool consistency with ileostomy reviewed. Patient shown how to open, empty and close pouch. Removed Ileostomy pouch using pull and push technique, cleansed skin with water, patted dry. At first patient with difficulty looking at stoma but able to glance at it a few times during teaching. Emotional support and encouragement provided. Taught patient how to properly assess stoma viability and peristomal skin. Patient actively observing and partially participating in stoma measurement, creating template, cutting wafer, applying barrier ring (to protect peristomal skin from enzymatic irritation), applying pouch. Patient reports difficulty with vision due to not having reading glasses with her at hospital. Reviewed s/s to report to MD. Importance of hydration reinforced. Ostomy clinic contact information and educational materials provided.     RLQ ileostomy: Stoma size 1 1/2". See A&I flowsheet for assessment details. 
Patient was seen 5/22 for initial ostomy teaching. Upon assessment today, patient noted to be more confused and forget than yesterdays assessment. Primary RN reports that patient was downgraded from SICU overnight and has been noted to have AMS. RLQ ileostomy pouch currently clean, dry, and intact with liquid stool in drainage bag. Patient unable to follow with ostomy teaching at current time, ostomy team will continue to follow patient.     General: A&Ox1, cachectic, thin, fragile skin. Able to turn with 2x assistance, incontinent of urine, patient has female external urinary  (primafit) in place. Skin warm, dry with increased moisture in intertriginous folds, scattered areas of hyperpigmentation and hypopigmentation, scattered areas of ecchymosis without hematoma on bilateral upper extremities. Right nare with sal[place] sump, peritubular skin intact.     Vascular of b/l lower extremities: Bilateral lower extremities with scattered areas of hyper and hypopigmentation. Dry, flaky skin. Thickened-yellow hyperpigmented overgrown toenails. No temperature changes noted. No Edema. Capillary refill less than 3 seconds. +2 DP/PT pulses. Blanchable erythema noted to b/l heels.     Sacrum to b/l buttocks: Patient at risk for incontinence associated dermatitis. Upon assessment, patient noted to have hypopigmentation to sacral area. No open ulcerations or denudations noted. No increased warmth, no drainage, no erythema, no induration, no edema, no overt signs of infection noted at this time. Goals of care: monitor for tissue type changes and continue measures to decrease friction/shear/pressure.     Left forearm: Area of two skin tears (proximal and distal) presenting as category type 3 skin tear with 100% loss of epidermal layer. Proximal measuring [id number]. and distal measuring 4dpe6sog2.1cm. Both presenting as mixed tissue of moist, yellow fibrin and moist pink dermis. Scant serofibrinous drainage, no odor. Periwound hypopigmentation. No increased warmth, no fluctuance, no erythema, no induration, no edema, no overt signs of infection noted at this time. Goals of care: monitor for tissue type changes.

## 2024-05-23 NOTE — DISCHARGE NOTE PROVIDER - NSDCCPCAREPLAN_GEN_ALL_CORE_FT
PRINCIPAL DISCHARGE DIAGNOSIS  Diagnosis: Mass of cecum  Assessment and Plan of Treatment: Mass of Sigmoid Colon and Partially Obstructing mass of Cecum

## 2024-05-23 NOTE — DISCHARGE NOTE PROVIDER - HOSPITAL COURSE
88F w/ severe TR and MVR, HTN, hypothyroidism, osteoporosis, hx of UTI, kidney stones, septic shock s/p cystoscopy, stone extraction, remote hx of subdural hematoma s/p craniotomy and SBO, chronically contracted came form [facility]isted [facility] for scheduled surgery. OR: Laparoscopic Assisted Total Abdominal Colectomy with End Ileostomy for partially obstructing cecal mass with sigmoid colon mass 5/20/24. The abdominal colon was sent to pathology as was peritoneal mass.  Peritoneal biopsy was also performed. Surgery went well. Intra-op documented 150cc blood loss. Exparel given at end of case. Received 0.5 hydromorphone + fentanyl. No additional pain meds given in PACU- no Duramorph. In PACU, patient persistently hypotensive to low 80s, requiring ephedrine pushes totaling 150. EKG ordered demonstrating NSR. Post operatively, patient was hypotensive w/ delayed arousal post op, with benign abdominal exam requiring admission to SICU for HD monitoring. Bedside POCUS performed- IVC adequate volume. normal cardiac squeeze, no signs of intravascular volume depletion; b- lines appreciated. Narcotics were discontinued. Patient transferred to SICU for vasopressor support and HD monitoring. Vasopressor weaned off. 5/22 Patient passed bedside speech/swallow. CLD started. Rogers removed and patient passed TOV.   Patient seen by PT who recommended Rehab.      88F w/ severe TR and MVR, HTN, hypothyroidism, osteoporosis, hx of UTI, kidney stones, septic shock s/p cystoscopy, stone extraction, remote hx of subdural hematoma s/p craniotomy and SBO, chronically contracted came form [facility]isted [facility] for scheduled surgery. OR: Laparoscopic Assisted Total Abdominal Colectomy with End Ileostomy for partially obstructing cecal mass with sigmoid colon mass 5/20/24. The abdominal colon was sent to pathology as was peritoneal mass.  Peritoneal biopsy was also performed. Surgery went well. Intra-op documented 150cc blood loss. Exparel given at end of case. Received 0.5 hydromorphone + fentanyl. No additional pain meds given in PACU- no Duramorph. In PACU, patient persistently hypotensive to low 80s, requiring ephedrine pushes totaling 150. EKG ordered demonstrating NSR. Post operatively, patient was hypotensive w/ delayed arousal post op, with benign abdominal exam. Bedside POCUS performed- IVC adequate volume. normal cardiac squeeze, no signs of intravascular volume depletion; b- lines appreciated. Narcotics were discontinued. Patient transferred to SICU for vasopressor support and HD monitoring. Vasopressor weaned off. Patient seen by PT who recommended Rehab. . 5/22 Patient passed bedside speech/swallow. CLD started. Rogers removed and patient passed TOV. Patient seen by Ostomy nurse for Ostomy care. Patient transferred to Surgical floor. 5/23 Patient began vomiting requiring NGT decompression.       88F w/ severe TR and MVR, HTN, hypothyroidism, osteoporosis, hx of UTI, kidney stones, septic shock s/p cystoscopy, stone extraction, remote hx of subdural hematoma s/p craniotomy and SBO, chronically contracted came form [facility]isted [facility] for scheduled surgery. OR: Laparoscopic Assisted Total Abdominal Colectomy with End Ileostomy for partially obstructing cecal mass with sigmoid colon mass 5/20/24. The abdominal colon was sent to pathology as was peritoneal mass.  Peritoneal biopsy was also performed. Surgery went well. Intra-op documented 150cc blood loss. Exparel given at end of case. Received 0.5 hydromorphone + fentanyl. No additional pain meds given in PACU- no Duramorph. In PACU, patient persistently hypotensive to low 80s, requiring ephedrine pushes totaling 150. EKG ordered demonstrating NSR. Post operatively, patient was hypotensive w/ delayed arousal post op, with benign abdominal exam. Bedside POCUS performed- IVC adequate volume. normal cardiac squeeze, no signs of intravascular volume depletion; b- lines appreciated. Narcotics were discontinued. Patient transferred to SICU for vasopressor support and HD monitoring. Head CT was done due to lethargy which showed: No acute intracranial findings. Vasopressor weaned off. Patient seen by PT who recommended Rehab. 5/22 Patient passed bedside speech/swallow. CLD started. Rogers removed and patient passed TOV. Patient seen by Ostomy nurse for Ostomy care. Patient transferred to Surgical floor. 5/23 Patient began vomiting requiring NGT decompression (though with +air and stool output via ostomy to   bedside drainage). Overnight patient removed NGT and IV. Was evaluated by Resident and was decided to monitor patient as no further   nausea/emesis. Patient continued to have +GI Fxn. PT evaluated patient and recommended Rehab. Diet was advanced to Clears and then   LRD as patient tolerated. 5/28 Patient is currently voiding and tolerating a Low Residue Diet without nausea or emesis. Ostomy is functioning.   Patient was started on Imodium 1 tab daily due to Ostomy output 700cc/24hrs (though not over 1 liter, elevated for 44kg patient.) Per   Dr. Chung, Patient is currently stable to d/c back to her [facility]isted [facility] Facility on the Rehab side with VNS/facility for ostomy care/emptying/  documenting out. Daughter Indu understands and agrees with above.

## 2024-05-23 NOTE — PROVIDER CONTACT NOTE (OTHER) - BACKGROUND
lap assisted end ileostomy
Pt s/p lap assisted total colectomy w/ end ileostomy creation. Pt transferred from SICU 5/23
lap assisted end ileostomy
Pt s/p lap total abdominal colectomy w. end ileostomy creation 5/20

## 2024-05-23 NOTE — ADVANCED PRACTICE NURSE CONSULT - RECOMMEDATIONS
Topical recommendations:     Sacrum to b/l buttocks: Cleanse with skin cleanser, pat dry. Apply Phil moisture barrier cream twice a day and PRN with incontinent episodes.     Nasogastric Tube: Cleanse nare with NS. Pat dry. Apply Liquid barrier film to periwound skin. Apply Stat-lock NGT ledesma over center of nare, not touch any skin circumferentially. Change every three days or prn if soiled or compromised.     RLQ ileostomy: Cleanse with water, pat dry. If impaired peristomal skin noted: apply stoma powder, brush off excess, and seal with liquid barrier film by using tapping motions. Apply liquid barrier film and mold skin barrier ring (eakins ring) around stoma. Apply 2 piece high output drainable ostomy pouch (wafer # [id number], pouch # [id number]). Change every 3 days and PRN if leaking.     B/L heels: Cleanse with NS, pat dry. Paint with Liquid barrier film (allow to dry) daily.    Left forearm: Cleanse with NS, pat dry. Apply Liquid barrier film to periwound skin (allow to dry). Apply adaptic touch to base and cover with silicone foam with border. Change every other day and PRN if soiled.     Apply Sween 24 Moisturizer to b/l UE and LE daily, avoiding in between the toes.     Continue low air loss bed therapy, continue heel elevation, continue to turn & reposition as per protocol, soft pillow between bony prominences, continue moisture management with barrier creams & single breathable pad, continue measures to decrease friction/shear/pressure. Continue with nutritional support as per dietary/orders.     Plan of care discussed with Primary RN Domenica and     Please contact Wound/Ostomy Care Service Line if we can be of further assistance (ext [phone]).  Topical recommendations:     Sacrum to b/l buttocks: Cleanse with skin cleanser, pat dry. Apply Phil moisture barrier cream twice a day and PRN with incontinent episodes.     Nasogastric Tube: Cleanse nare with NS. Pat dry. Apply Liquid barrier film to periwound skin. Apply Stat-lock NGT ledesma over center of nare, not touch any skin circumferentially. Change every three days or prn if soiled or compromised.     RLQ ileostomy: Cleanse with water, pat dry. If impaired peristomal skin noted: apply stoma powder, brush off excess, and seal with liquid barrier film by using tapping motions. Apply liquid barrier film and mold skin barrier ring (eakins ring) around stoma. Apply 2 piece high output drainable ostomy pouch (wafer # [id number], pouch # [id number]). Change every 3 days and PRN if leaking.     B/L heels: Cleanse with NS, pat dry. Paint with Liquid barrier film (allow to dry) daily.    Left forearm: Cleanse with NS, pat dry. Apply Liquid barrier film to periwound skin (allow to dry). Apply adaptic touch to base and cover with silicone foam with border. Change every other day and PRN if soiled.     Apply Sween 24 Moisturizer to b/l UE and LE daily, avoiding in between the toes.     Continue low air loss bed therapy, continue heel elevation, continue to turn & reposition as per protocol, soft pillow between bony prominences, continue moisture management with barrier creams & single breathable pad, continue measures to decrease friction/shear/pressure. Continue with nutritional support as per dietary/orders.     Plan of care discussed with Primary RN Domenica and Karina Madrigal (PA).    Please contact Wound/Ostomy Care Service Line if we can be of further assistance (ext [phone]).

## 2024-05-23 NOTE — DISCHARGE NOTE PROVIDER - NSDCCPTREATMENT_GEN_ALL_CORE_FT
PRINCIPAL PROCEDURE  Procedure: Laparoscopic total abdominal colectomy with end ileostomy  Findings and Treatment:

## 2024-05-23 NOTE — ADVANCED PRACTICE NURSE CONSULT - REASON FOR CONSULT
Patient seen for initial ostomy teaching. As per H&P, patient is a 87 y/o female with pmhx of HTN, hypothyroidism, osteoporosis, hx of UTI, kidney stones, septic shock s/p cystoscopy, stone extraction and remote hx of subdural hematoma s/p craniotomy and SBO presents with dx of Neoplasm of Digestive System. She was recently hospitalized at [facility] 3/20/24-3/28/24 with SBO. CT showed terminal ileum thickening, possible small bowel neoplasm with upstream fluid distention suggesting partial SBO. 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 
Patient seen for follow-up ostomy and after referral was made for assessment and topical recommendations of sacrum stage 1. As per H&P, patient is a 89 y/o female with pmhx of HTN, hypothyroidism, osteoporosis, hx of UTI, kidney stones, septic shock s/p cystoscopy, stone extraction and remote hx of subdural hematoma s/p craniotomy and SBO presents with dx of Neoplasm of Digestive System. She was recently hospitalized at [facility] 3/20/24-3/28/24 with SBO. CT showed terminal ileum thickening, possible small bowel neoplasm with upstream fluid distention suggesting partial SBO. 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

## 2024-05-23 NOTE — PROGRESS NOTE ADULT - ASSESSMENT
89 y/o female with pmhx of HTN, hypothyroidism, osteoporosis, hx of UTI, kidney stones, septic shock s/p cystoscopy, stone extraction and remote hx of subdural hematoma s/p craniotomy and SBO presents with dx of Neoplasm of Digestive System. She was recently hospitalized at [facility] 3/20/24-3/28/24 with SBO. CT showed terminal ileum thickening, possible small bowel neoplasm with upstream fluid distention suggesting partial SBO. 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 Of note, cecal mass noted to be dense and adhered to pelvic sidewall. Small nodularity in mesentery biopsies, negative for malignancy on frozen. Palpable mobile mass in sigmoid with tattoo palpable. SICU consulted for hemodynamic monitoring.    PLAN  NEUROLOGIC   - Pain control with tylenol   - home Lexapro    RESPIRATORY   - Currently on RA  - Monitor SpO2 goal >92%  - Loratadine    CARDIOVASCULAR   - Monitor hemodynamics   - MAP goal > 65  - Home Nebivolol    GASTROINTESTINAL   - Diet: regular    /RENAL   - IV fluids: D51/2NS @ 40 cc/hr  - Monitor BMP  - Strict I/Os  - Monitor electrolytes, replete PRN      HEMATOLOGIC  - Monitor H/H   - DVT prophylaxis: SCDs & subq heparin    INFECTIOUS DISEASE  - Monitor fever/WC    ENDOCRINE  - Monitor gluc  - home levothyroxine    LINES  - PIV    Disposition: Listed  --------------------------------------------------------------------------------------    Critical Care Diagnoses:

## 2024-05-23 NOTE — PROVIDER CONTACT NOTE (OTHER) - ASSESSMENT
Pt agitated attempting to get out of bed. Pt non- redirectable.  Pt oriented to person. Disoriented to situation, place, and time.

## 2024-05-23 NOTE — PROGRESS NOTE ADULT - SUBJECTIVE AND OBJECTIVE BOX
SICU Daily Progress Note  =====================================================  Interval Events:  - d50 x1 > f/u repeat glucose  - Regular diet  - Listed    ALLERGIES:  nebivolol (Other)      --------------------------------------------------------------------------------------    MEDICATIONS:    Neurologic Medications  acetaminophen     Tablet .. 1000 milliGRAM(s) Oral every 6 hours  escitalopram 5 milliGRAM(s) Oral daily    Respiratory Medications  loratadine 10 milliGRAM(s) Oral daily    Cardiovascular Medications  nebivolol 2.5 milliGRAM(s) Oral daily    Gastrointestinal Medications    Genitourinary Medications    Hematologic/Oncologic Medications  heparin   Injectable 5000 Unit(s) SubCutaneous every 8 hours    Antimicrobial/Immunologic Medications    Endocrine/Metabolic Medications  levothyroxine 50 MICROGram(s) Oral daily    Topical/Other Medications  chlorhexidine 2% Cloths 1 Application(s) Topical daily    --------------------------------------------------------------------------------------    VITAL SIGNS:  ICU Vital Signs Last 24 Hrs  T(C): 36.6 (23 May 2024 00:00), Max: 36.9 (22 May 2024 16:00)  T(F): 97.8 (23 May 2024 00:00), Max: 98.5 (22 May 2024 16:00)  HR: 77 (23 May 2024 00:00) (59 - 82)  BP: 139/67 (23 May 2024 00:00) (113/63 - 140/66)  BP(mean): 88 (23 May 2024 00:00) (75 - 122)  ABP: --  ABP(mean): --  RR: 19 (23 May 2024 00:00) (14 - 21)  SpO2: 99% (23 May 2024 00:00) (96% - 100%)    O2 Parameters below as of 23 May 2024 00:00  Patient On (Oxygen Delivery Method): room air    --------------------------------------------------------------------------------------    INS AND OUTS:                        9.7    8.72  )-----------( 232      ( 22 May 2024 01:21 )             28.8   05-22    140  |  107  |  8   ----------------------------<  68<L>  4.1   |  22  |  0.58    Ca    8.4      22 May 2024 01:21  Phos  2.6     05-22  Mg     1.90     05-22    --------------------------------------------------------------------------------------    EXAM  NEUROLOGY  RASS:   	GCS:    Exam: Normal, NAD, less lethargic, oriented x 2-3, no focal deficits.     HEENT  Exam: Normocephalic, atraumatic, EOMI.      RESPIRATORY  Exam: Lungs clear to auscultation, Normal expansion/effort.     CARDIOVASCULAR  Exam: S1, S2.  Regular rate and rhythm.       GI/NUTRITION  Exam: Abdomen soft, Non-tender, Non-distended.    Current Diet:  reg      METABOLIC / FLUIDS / ELECTROLYTES      HEMATOLOGIC  [x] VTE Prophylaxis: heparin   Injectable 5000 Unit(s) SubCutaneous every 8 hours    Transfusions:	[] PRBC	[] Platelets		[] FFP	[] Cryoprecipitate    INFECTIOUS DISEASE  Antimicrobials/Immunologic Medications:    Day #      of     ***    TUBES / LINES / DRAINS  ***  [x] Peripheral IV  [] Central Venous Line     	[] R	[] L	[] IJ	[] Fem	[] SC	Date Placed:   [] Arterial Line		[] R	[] L	[] Fem	[] Rad	[] Ax	Date Placed:   [] PICC		[] Midline		[] Mediport  [] Urinary Catheter		Date Placed:   [x] Necessity of urinary, arterial, and venous catheters discussed    --------------------------------------------------------------------------------------    LABS                          9.7    8.72  )-----------( 232      ( 22 May 2024 01:21 )             28.8   05-22    140  |  107  |  8   ----------------------------<  68<L>  4.1   |  22  |  0.58    Ca    8.4      22 May 2024 01:21  Phos  2.6     05-22  Mg     1.90     05-22      --------------------------------------------------------------------------------------    OTHER LABORATORY:     IMAGING STUDIES:   CXR:

## 2024-05-23 NOTE — PROGRESS NOTE ADULT - SUBJECTIVE AND OBJECTIVE BOX
Vital Signs Last 24 Hrs  T(C): 36.4 (23 May 2024 01:50), Max: 36.9 (22 May 2024 16:00)  T(F): 97.6 (23 May 2024 01:50), Max: 98.5 (22 May 2024 16:00)  HR: 85 (23 May 2024 01:50) (59 - 85)  BP: 116/91 (23 May 2024 01:50) (116/62 - 140/66)  BP(mean): 88 (23 May 2024 00:00) (78 - 122)  RR: 21 (23 May 2024 01:50) (14 - 21)  SpO2: 96% (23 May 2024 01:50) (96% - 99%)    Parameters below as of 23 May 2024 01:50  Patient On (Oxygen Delivery Method): room air    SUBJECTIVE: Pt seen and examined bedside on morning rounds. Patient had one episode of bilious vomiting early this morning. She is awake and alert with bilious output from her ostomy. Denies any  fever, chills, n/v/d, SOB, or chest pain.    General: NAD, resting in bed comfortably.  Cardiac:  warm and well perfused  Respiratory: Nonlabored respirations, normal cw expansion.  Abdomen: soft, nontender, nondistended.  incision is c/d/i. R  ostomy + with bilious drainage to gravity.   Extremities: Contracted    I&O's Summary    22 May 2024 07:01  -  23 May 2024 07:00  --------------------------------------------------------  IN: 40 mL / OUT: 1600 mL / NET: -1560 mL      I&O's Detail    22 May 2024 07:01  -  23 May 2024 07:00  --------------------------------------------------------  IN:    dextrose 5% + sodium chloride 0.45%: 40 mL  Total IN: 40 mL    OUT:    Ileostomy (mL): 600 mL    Oral Fluid: 0 mL    Voided (mL): 1000 mL  Total OUT: 1600 mL    Total NET: -1560 mL          MEDICATIONS  (STANDING):  acetaminophen     Tablet .. 1000 milliGRAM(s) Oral every 6 hours  chlorhexidine 2% Cloths 1 Application(s) Topical daily  dextrose 5% + sodium chloride 0.45% with potassium chloride 20 mEq/L 1000 milliLiter(s) (40 mL/Hr) IV Continuous <Continuous>  escitalopram 5 milliGRAM(s) Oral daily  heparin   Injectable 5000 Unit(s) SubCutaneous every 8 hours  levothyroxine 50 MICROGram(s) Oral daily  loratadine 10 milliGRAM(s) Oral daily  nebivolol 2.5 milliGRAM(s) Oral daily    MEDICATIONS  (PRN):      LABS:                        9.7    8.72  )-----------( 232      ( 22 May 2024 01:21 )             28.8     05-22    140  |  107  |  8   ----------------------------<  68<L>  4.1   |  22  |  0.58    Ca    8.4      22 May 2024 01:21  Phos  2.6     05-22  Mg     1.90     05-22

## 2024-05-23 NOTE — DISCHARGE NOTE PROVIDER - CARE PROVIDER_API CALL
Julio Chung  Surgery  [address] [place] [address], [address]  [place] [address]  Phone: [phone]  Fax: [phone]  Follow Up Time: 1 week

## 2024-05-23 NOTE — PROVIDER CONTACT NOTE (OTHER) - ASSESSMENT
patient had 2 episodes of vomiting, allergy sensitivity to nebivolol patient had 2 episodes of vomiting, allergy sensitivity to nebivolol generic brand (Bystolic)

## 2024-05-23 NOTE — PROVIDER CONTACT NOTE (OTHER) - ASSESSMENT
Pt had an episode of large amount of emesis. Large amount of green, thick emesis noted all over pt's gown and bedding. Pt has not has anything PO since arrival to unit.

## 2024-05-23 NOTE — CHART NOTE - NSCHARTNOTEFT_GEN_A_CORE
SICU Transfer Note     Interval Events:  - d50 x1 > f/u repeat glucose  - Regular diet    Patient seen and examined. No acute concerns. Tolerating diet.    Vital Signs Last 24 Hrs  T(C): 36.6 (23 May 2024 00:00), Max: 36.9 (22 May 2024 16:00)  T(F): 97.8 (23 May 2024 00:00), Max: 98.5 (22 May 2024 16:00)  HR: 77 (23 May 2024 00:00) (59 - 82)  BP: 139/67 (23 May 2024 00:00) (113/63 - 140/66)  BP(mean): 88 (23 May 2024 00:00) (75 - 122)  RR: 19 (23 May 2024 00:00) (14 - 21)  SpO2: 99% (23 May 2024 00:00) (96% - 100%)    Parameters below as of 23 May 2024 00:00  Patient On (Oxygen Delivery Method): room air    I&O's Summary    21 May 2024 07:01  -  22 May 2024 07:00  --------------------------------------------------------  IN: 3201.2 mL / OUT: 1515 mL / NET: 1686.2 mL    22 May 2024 07:01  -  23 May 2024 01:17  --------------------------------------------------------  IN: 40 mL / OUT: 1050 mL / NET: -1010 mL    EXAM  General: NAD, resting in bed comfortably.  Cardiac:  warm and well perfused  Respiratory: Nonlabored respirations, normal cw expansion.  Abdomen: soft, nontender, nondistended.  incision is c/d/i. R  ostomy +    89 y/o female with pmhx of HTN, hypothyroidism, osteoporosis, hx of UTI, kidney stones, septic shock s/p cystoscopy, stone extraction and remote hx of subdural hematoma s/p craniotomy and SBO. 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 Of note, cecal mass noted to be dense and adhered to pelvic sidewall. Small nodularity in mesentery biopsies, negative for malignancy on frozen. Palpable mobile mass in sigmoid with tattoo palpable. Pt downgraded from SICU (5/23).    -Regular diet  -HSQ  -Ostomy reaching  -Monitor fluid status    Surgery A team

## 2024-05-23 NOTE — DISCHARGE NOTE PROVIDER - DETAILS OF MALNUTRITION DIAGNOSIS/DIAGNOSES
This patient has been assessed with a concern for Malnutrition and was treated during this hospitalization for the following Nutrition diagnosis/diagnoses:     -  05/22/2024: Moderate protein-calorie malnutrition   -  05/22/2024: Underweight (BMI < 19)

## 2024-05-23 NOTE — PROVIDER CONTACT NOTE (OTHER) - ACTION/TREATMENT ORDERED:
provider aware, okay to hold PO medications, aware nebivolol is held - patient has allergy sensitivity to nebivolol provider aware, okay to hold PO medications, aware nebivolol is held - patient has allergy sensitivity to nebivolol generic brand (Bystolic)

## 2024-05-24 LAB
ANION GAP SERPL CALC-SCNC: 8 MMOL/L — SIGNIFICANT CHANGE UP (ref 7–14)
BUN SERPL-MCNC: 8 MG/DL — SIGNIFICANT CHANGE UP (ref 7–23)
CALCIUM SERPL-MCNC: 8.5 MG/DL — SIGNIFICANT CHANGE UP (ref 8.4–10.5)
CHLORIDE SERPL-SCNC: 102 MMOL/L — SIGNIFICANT CHANGE UP (ref 98–107)
CO2 SERPL-SCNC: 26 MMOL/L — SIGNIFICANT CHANGE UP (ref 22–31)
CREAT SERPL-MCNC: 0.73 MG/DL — SIGNIFICANT CHANGE UP (ref 0.5–1.3)
EGFR: 80 ML/MIN/1.73M2 — SIGNIFICANT CHANGE UP
GLUCOSE BLDC GLUCOMTR-MCNC: 100 MG/DL — HIGH (ref 70–99)
GLUCOSE BLDC GLUCOMTR-MCNC: 101 MG/DL — HIGH (ref 70–99)
GLUCOSE BLDC GLUCOMTR-MCNC: 94 MG/DL — SIGNIFICANT CHANGE UP (ref 70–99)
GLUCOSE SERPL-MCNC: 126 MG/DL — HIGH (ref 70–99)
HCT VFR BLD CALC: 30.5 % — LOW (ref 34.5–45)
HGB BLD-MCNC: 10.3 G/DL — LOW (ref 11.5–15.5)
MAGNESIUM SERPL-MCNC: 1.7 MG/DL — SIGNIFICANT CHANGE UP (ref 1.6–2.6)
MCHC RBC-ENTMCNC: 29.9 PG — SIGNIFICANT CHANGE UP (ref 27–34)
MCHC RBC-ENTMCNC: 33.8 GM/DL — SIGNIFICANT CHANGE UP (ref 32–36)
MCV RBC AUTO: 88.7 FL — SIGNIFICANT CHANGE UP (ref 80–100)
NRBC # BLD: 0 /100 WBCS — SIGNIFICANT CHANGE UP (ref 0–0)
NRBC # FLD: 0 K/UL — SIGNIFICANT CHANGE UP (ref 0–0)
PHOSPHATE SERPL-MCNC: 2.1 MG/DL — LOW (ref 2.5–4.5)
PLATELET # BLD AUTO: 287 K/UL — SIGNIFICANT CHANGE UP (ref 150–400)
POTASSIUM SERPL-MCNC: 3.9 MMOL/L — SIGNIFICANT CHANGE UP (ref 3.5–5.3)
POTASSIUM SERPL-SCNC: 3.9 MMOL/L — SIGNIFICANT CHANGE UP (ref 3.5–5.3)
RBC # BLD: 3.44 M/UL — LOW (ref 3.8–5.2)
RBC # FLD: 15.5 % — HIGH (ref 10.3–14.5)
SODIUM SERPL-SCNC: 136 MMOL/L — SIGNIFICANT CHANGE UP (ref 135–145)
WBC # BLD: 11.16 K/UL — HIGH (ref 3.8–10.5)
WBC # FLD AUTO: 11.16 K/UL — HIGH (ref 3.8–10.5)

## 2024-05-24 PROCEDURE — 71045 X-RAY EXAM CHEST 1 VIEW: CPT | Mod: 26

## 2024-05-24 RX ORDER — MAGNESIUM SULFATE 500 MG/ML
2 VIAL (ML) INJECTION ONCE
Refills: 0 | Status: COMPLETED | OUTPATIENT
Start: 2024-05-24 | End: 2024-05-24

## 2024-05-24 RX ORDER — SODIUM CHLORIDE 9 MG/ML
500 INJECTION, SOLUTION INTRAVENOUS ONCE
Refills: 0 | Status: COMPLETED | OUTPATIENT
Start: 2024-05-24 | End: 2024-05-24

## 2024-05-24 RX ORDER — ONDANSETRON 8 MG/1
4 TABLET, FILM COATED ORAL ONCE
Refills: 0 | Status: COMPLETED | OUTPATIENT
Start: 2024-05-24 | End: 2024-05-24

## 2024-05-24 RX ORDER — POTASSIUM PHOSPHATE, MONOBASIC POTASSIUM PHOSPHATE, DIBASIC 236; 224 MG/ML; MG/ML
15 INJECTION, SOLUTION INTRAVENOUS ONCE
Refills: 0 | Status: DISCONTINUED | OUTPATIENT
Start: 2024-05-24 | End: 2024-05-24

## 2024-05-24 RX ORDER — POTASSIUM PHOSPHATE, MONOBASIC POTASSIUM PHOSPHATE, DIBASIC 236; 224 MG/ML; MG/ML
15 INJECTION, SOLUTION INTRAVENOUS ONCE
Refills: 0 | Status: COMPLETED | OUTPATIENT
Start: 2024-05-24 | End: 2024-05-24

## 2024-05-24 RX ADMIN — HEPARIN SODIUM 5000 UNIT(S): 5000 INJECTION INTRAVENOUS; SUBCUTANEOUS at 15:23

## 2024-05-24 RX ADMIN — DEXTROSE MONOHYDRATE, SODIUM CHLORIDE, AND POTASSIUM CHLORIDE 80 MILLILITER(S): 50; .745; 4.5 INJECTION, SOLUTION INTRAVENOUS at 01:09

## 2024-05-24 RX ADMIN — HEPARIN SODIUM 5000 UNIT(S): 5000 INJECTION INTRAVENOUS; SUBCUTANEOUS at 06:14

## 2024-05-24 RX ADMIN — Medication 50 GRAM(S): at 10:33

## 2024-05-24 RX ADMIN — SODIUM CHLORIDE 500 MILLILITER(S): 9 INJECTION, SOLUTION INTRAVENOUS at 09:02

## 2024-05-24 RX ADMIN — Medication 1000 MILLIGRAM(S): at 07:22

## 2024-05-24 RX ADMIN — Medication 38 MICROGRAM(S): at 06:14

## 2024-05-24 RX ADMIN — Medication 1000 MILLIGRAM(S): at 02:00

## 2024-05-24 RX ADMIN — HEPARIN SODIUM 5000 UNIT(S): 5000 INJECTION INTRAVENOUS; SUBCUTANEOUS at 22:03

## 2024-05-24 RX ADMIN — POTASSIUM PHOSPHATE, MONOBASIC POTASSIUM PHOSPHATE, DIBASIC 62.5 MILLIMOLE(S): 236; 224 INJECTION, SOLUTION INTRAVENOUS at 12:51

## 2024-05-24 RX ADMIN — ONDANSETRON 4 MILLIGRAM(S): 8 TABLET, FILM COATED ORAL at 22:01

## 2024-05-24 RX ADMIN — Medication 400 MILLIGRAM(S): at 01:04

## 2024-05-24 RX ADMIN — Medication 400 MILLIGRAM(S): at 07:07

## 2024-05-24 RX ADMIN — CHLORHEXIDINE GLUCONATE 1 APPLICATION(S): 213 SOLUTION TOPICAL at 12:51

## 2024-05-24 NOTE — PROGRESS NOTE ADULT - ASSESSMENT
87 y/o female with pmhx of HTN, hypothyroidism, osteoporosis, hx of UTI, kidney stones, septic shock s/p cystoscopy, stone extraction and remote hx of subdural hematoma s/p craniotomy and SBO. 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 Of note, cecal mass noted to be dense and adhered to pelvic sidewall. Small nodularity in mesentery biopsies, negative for malignancy on frozen. Palpable mobile mass in sigmoid with tattoo palpable. Pt downgraded from SICU (5/23).      Plan  - NPO  - HSQ  - IVF, 500cc   - NGT clamp trial, f/u results   - follow up Speech and swallow eval   - Antiemetics PRN   - Tylenol PO  - MOVE PATIENT BACK TO [address] [place] [address]

## 2024-05-24 NOTE — CHART NOTE - NSCHARTNOTEFT_GEN_A_CORE
Interval exam: Patient removed NGT after ~300cc suction + ~200cc emesis earlier. Offered replacement of NGT. Patient adamantly refuses replacement of NGT this evening- reports that she feels much better and her nausea and vomiting have subsided. Abdomen soft, mildly distended, w/ +gas in ostomy bag. Discussed with patient that if nausea and vomiting persists, we should try a smaller NGT. Patient understands and says that they may consider replacement later on if needed.

## 2024-05-24 NOTE — PROVIDER CONTACT NOTE (OTHER) - SITUATION
Pt had an NGT in right nostril. NGT was removed as per order at 14:00. Pt after drinking water complaining of abdominal discomfort and nausea.

## 2024-05-24 NOTE — PROGRESS NOTE ADULT - SUBJECTIVE AND OBJECTIVE BOX
Surgery Progress Note    OVERNIGHT EVENTS: NAEO, patient moved to [address] [place] [address] overnight, mild tachycardia     SUBJECTIVE: Pt seen and examined at bedside. Patient comfortable and in no-apparent distress. Patient slightly confused this morning.     Vital Signs Last 24 Hrs  T(C): 36.7 (24 May 2024 10:15), Max: 37.1 (23 May 2024 21:44)  T(F): 98 (24 May 2024 10:15), Max: 98.7 (23 May 2024 21:44)  HR: 89 (24 May 2024 10:15) (89 - 102)  BP: 135/65 (24 May 2024 10:15) (115/61 - 135/65)  BP(mean): --  RR: 18 (24 May 2024 10:15) (16 - 18)  SpO2: 99% (24 May 2024 10:15) (95% - 99%)    Parameters below as of 24 May 2024 10:15  Patient On (Oxygen Delivery Method): room air        PHYSICAL EXAM:  General: NAD, resting in bed comfortably, slightly confused   Cardiac:  warm and well perfused  Respiratory: Nonlabored respirations, normal cw expansion.  Abdomen: soft, nontender, nondistended.  incision is c/d/i. R  ostomy + with bilious drainage to gravity.         INs and OUTs:    05-23-24 @ 07:01  -  05-24-24 @ 07:00  --------------------------------------------------------  IN: 530 mL / OUT: 2160 mL / NET: -1630 mL    05-24-24 @ 07:01  -  05-24-24 @ 12:13  --------------------------------------------------------  IN: 0 mL / OUT: 250 mL / NET: -250 mL        LABS:                        10.3   11.16 )-----------( 287      ( 24 May 2024 07:40 )             30.5     05-24    136  |  102  |  8   ----------------------------<  126<H>  3.9   |  26  |  0.73    Ca    8.5      24 May 2024 07:40  Phos  2.1     05-24  Mg     1.70     05-24        Urinalysis Basic - ( 24 May 2024 07:40 )    Color: x / Appearance: x / SG: x / pH: x  Gluc: 126 mg/dL / Ketone: x  / Bili: x / Urobili: x   Blood: x / Protein: x / Nitrite: x   Leuk Esterase: x / RBC: x / WBC x   Sq Epi: x / Non Sq Epi: x / Bacteria: x

## 2024-05-25 LAB
ANION GAP SERPL CALC-SCNC: 16 MMOL/L — HIGH (ref 7–14)
BUN SERPL-MCNC: 18 MG/DL — SIGNIFICANT CHANGE UP (ref 7–23)
CALCIUM SERPL-MCNC: 9 MG/DL — SIGNIFICANT CHANGE UP (ref 8.4–10.5)
CHLORIDE SERPL-SCNC: 100 MMOL/L — SIGNIFICANT CHANGE UP (ref 98–107)
CO2 SERPL-SCNC: 23 MMOL/L — SIGNIFICANT CHANGE UP (ref 22–31)
CREAT SERPL-MCNC: 1.02 MG/DL — SIGNIFICANT CHANGE UP (ref 0.5–1.3)
EGFR: 54 ML/MIN/1.73M2 — LOW
GLUCOSE BLDC GLUCOMTR-MCNC: 139 MG/DL — HIGH (ref 70–99)
GLUCOSE BLDC GLUCOMTR-MCNC: 91 MG/DL — SIGNIFICANT CHANGE UP (ref 70–99)
GLUCOSE BLDC GLUCOMTR-MCNC: 93 MG/DL — SIGNIFICANT CHANGE UP (ref 70–99)
GLUCOSE SERPL-MCNC: 75 MG/DL — SIGNIFICANT CHANGE UP (ref 70–99)
HCT VFR BLD CALC: 31.8 % — LOW (ref 34.5–45)
HGB BLD-MCNC: 10.6 G/DL — LOW (ref 11.5–15.5)
MAGNESIUM SERPL-MCNC: 2.2 MG/DL — SIGNIFICANT CHANGE UP (ref 1.6–2.6)
MCHC RBC-ENTMCNC: 30 PG — SIGNIFICANT CHANGE UP (ref 27–34)
MCHC RBC-ENTMCNC: 33.3 GM/DL — SIGNIFICANT CHANGE UP (ref 32–36)
MCV RBC AUTO: 90.1 FL — SIGNIFICANT CHANGE UP (ref 80–100)
NRBC # BLD: 0 /100 WBCS — SIGNIFICANT CHANGE UP (ref 0–0)
NRBC # FLD: 0 K/UL — SIGNIFICANT CHANGE UP (ref 0–0)
PHOSPHATE SERPL-MCNC: 3.4 MG/DL — SIGNIFICANT CHANGE UP (ref 2.5–4.5)
PLATELET # BLD AUTO: 320 K/UL — SIGNIFICANT CHANGE UP (ref 150–400)
POTASSIUM SERPL-MCNC: 4.3 MMOL/L — SIGNIFICANT CHANGE UP (ref 3.5–5.3)
POTASSIUM SERPL-SCNC: 4.3 MMOL/L — SIGNIFICANT CHANGE UP (ref 3.5–5.3)
RBC # BLD: 3.53 M/UL — LOW (ref 3.8–5.2)
RBC # FLD: 15.6 % — HIGH (ref 10.3–14.5)
SODIUM SERPL-SCNC: 139 MMOL/L — SIGNIFICANT CHANGE UP (ref 135–145)
WBC # BLD: 8.33 K/UL — SIGNIFICANT CHANGE UP (ref 3.8–10.5)
WBC # FLD AUTO: 8.33 K/UL — SIGNIFICANT CHANGE UP (ref 3.8–10.5)

## 2024-05-25 RX ADMIN — LORATADINE 10 MILLIGRAM(S): 10 TABLET ORAL at 13:31

## 2024-05-25 RX ADMIN — HEPARIN SODIUM 5000 UNIT(S): 5000 INJECTION INTRAVENOUS; SUBCUTANEOUS at 07:00

## 2024-05-25 RX ADMIN — HEPARIN SODIUM 5000 UNIT(S): 5000 INJECTION INTRAVENOUS; SUBCUTANEOUS at 13:32

## 2024-05-25 RX ADMIN — CHLORHEXIDINE GLUCONATE 1 APPLICATION(S): 213 SOLUTION TOPICAL at 13:32

## 2024-05-25 RX ADMIN — Medication 38 MICROGRAM(S): at 07:00

## 2024-05-25 RX ADMIN — HEPARIN SODIUM 5000 UNIT(S): 5000 INJECTION INTRAVENOUS; SUBCUTANEOUS at 21:45

## 2024-05-25 RX ADMIN — ESCITALOPRAM OXALATE 5 MILLIGRAM(S): 10 TABLET, FILM COATED ORAL at 13:31

## 2024-05-25 NOTE — PROGRESS NOTE ADULT - SUBJECTIVE AND OBJECTIVE BOX
Vital Signs Last 24 Hrs  T(C): 36.8 (25 May 2024 07:00), Max: 37.3 (24 May 2024 14:00)  T(F): 98.2 (25 May 2024 07:00), Max: 99.1 (24 May 2024 14:00)  HR: 85 (25 May 2024 07:00) (84 - 98)  BP: 130/68 (25 May 2024 07:00) (117/64 - 135/65)  BP(mean): --  RR: 16 (25 May 2024 07:00) (16 - 18)  SpO2: 98% (25 May 2024 07:00) (96% - 99%)    Parameters below as of 25 May 2024 07:00  Patient On (Oxygen Delivery Method): room air      SUBJECTIVE: Pt seen and examined bedside on morning rounds. Patient had two episodes of emesis overnight and received an NGT. She was slightly confused and removed her NG tube and IV access early this morning. Since then, no further emesis or nausea, is oriented to person and place. Denies any fever, chills, SOB, or chest pain.     General: NAD, resting in bed comfortably, A&O2  Cardiac:  warm and well perfused  Respiratory: Nonlabored respirations, normal cw expansion.  Abdomen: soft, nontender, nondistended.  incision is c/d/i. R  ostomy + with bilious drainage to gravity.     I&O's Summary    24 May 2024 07:01  -  25 May 2024 07:00  --------------------------------------------------------  IN: 0 mL / OUT: 1040 mL / NET: -1040 mL      I&O's Detail    24 May 2024 07:01  -  25 May 2024 07:00  --------------------------------------------------------  IN:  Total IN: 0 mL    OUT:    Ileostomy (mL): 390 mL    Nasogastric/Oral tube (mL): 150 mL    Voided (mL): 500 mL  Total OUT: 1040 mL    Total NET: -1040 mL    MEDICATIONS  (STANDING):  chlorhexidine 2% Cloths 1 Application(s) Topical daily  dextrose 5% + sodium chloride 0.45% with potassium chloride 20 mEq/L 1000 milliLiter(s) (80 mL/Hr) IV Continuous <Continuous>  escitalopram 5 milliGRAM(s) Oral daily  heparin   Injectable 5000 Unit(s) SubCutaneous every 8 hours  levothyroxine Injectable 38 MICROGram(s) IV Push <User Schedule>  loratadine 10 milliGRAM(s) Oral daily  nebivolol 2.5 milliGRAM(s) Oral daily    MEDICATIONS  (PRN):      LABS:                        10.6   8.33  )-----------( 320      ( 25 May 2024 06:45 )             31.8     05-25    139  |  100  |  x   ----------------------------<  x   4.3   |  x   |  x     Ca    8.5      24 May 2024 07:40  Phos  2.1     05-24  Mg     2.20     05-25

## 2024-05-25 NOTE — CHART NOTE - NSCHARTNOTESELECT_GEN_ALL_CORE
Event Note
Interval Exam/Event Note
POCUS/Event Note
Interval exam/Event Note
Post op check/Event Note
SICU transfer note/Event Note

## 2024-05-25 NOTE — PROGRESS NOTE ADULT - ASSESSMENT
87 y/o female with pmhx of HTN, hypothyroidism, osteoporosis, hx of UTI, kidney stones, septic shock s/p cystoscopy, stone extraction and remote hx of subdural hematoma s/p craniotomy and SBO. 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 Of note, cecal mass noted to be dense and adhered to pelvic sidewall. Small nodularity in mesentery biopsies, negative for malignancy on frozen. Palpable mobile mass in sigmoid with tattoo palpable. Pt downgraded from SICU (5/23).      Plan  - NPO  - HSQ  - IVF  - NGT if vomits  - Antiemetics PRN   - MOVE PATIENT BACK TO [address] [place] [address]    87 y/o female with pmhx of HTN, hypothyroidism, osteoporosis, hx of UTI, kidney stones, septic shock s/p cystoscopy, stone extraction and remote hx of subdural hematoma s/p craniotomy and SBO. 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 Of note, cecal mass noted to be dense and adhered to pelvic sidewall. Small nodularity in mesentery biopsies, negative for malignancy on frozen. Palpable mobile mass in sigmoid with tattoo palpable. Pt downgraded from SICU (5/23).      Plan  - NPO w/ sips chips  - Hsq  - IVF  - Antiemetics PRN   - Continue home meds

## 2024-05-25 NOTE — CHART NOTE - NSCHARTNOTEFT_GEN_A_CORE
Interval Exam: Patient seen and examined bedside. Per nursing, patient self removed IV line and refuses to replace until the morning. Discussed with patient the importance of having IV access, as they are currently NPO/IVF. Patient  wishes to rest at this time and will allow nursing staff to replace IVL in the AM.

## 2024-05-26 LAB
ANION GAP SERPL CALC-SCNC: 10 MMOL/L — SIGNIFICANT CHANGE UP (ref 7–14)
BUN SERPL-MCNC: 14 MG/DL — SIGNIFICANT CHANGE UP (ref 7–23)
CALCIUM SERPL-MCNC: 8.6 MG/DL — SIGNIFICANT CHANGE UP (ref 8.4–10.5)
CHLORIDE SERPL-SCNC: 101 MMOL/L — SIGNIFICANT CHANGE UP (ref 98–107)
CO2 SERPL-SCNC: 24 MMOL/L — SIGNIFICANT CHANGE UP (ref 22–31)
CREAT SERPL-MCNC: 0.79 MG/DL — SIGNIFICANT CHANGE UP (ref 0.5–1.3)
EGFR: 73 ML/MIN/1.73M2 — SIGNIFICANT CHANGE UP
GLUCOSE BLDC GLUCOMTR-MCNC: 115 MG/DL — HIGH (ref 70–99)
GLUCOSE BLDC GLUCOMTR-MCNC: 126 MG/DL — HIGH (ref 70–99)
GLUCOSE BLDC GLUCOMTR-MCNC: 147 MG/DL — HIGH (ref 70–99)
GLUCOSE BLDC GLUCOMTR-MCNC: 155 MG/DL — HIGH (ref 70–99)
GLUCOSE SERPL-MCNC: 116 MG/DL — HIGH (ref 70–99)
HCT VFR BLD CALC: 31.4 % — LOW (ref 34.5–45)
HGB BLD-MCNC: 10.3 G/DL — LOW (ref 11.5–15.5)
MAGNESIUM SERPL-MCNC: 1.9 MG/DL — SIGNIFICANT CHANGE UP (ref 1.6–2.6)
MCHC RBC-ENTMCNC: 29.8 PG — SIGNIFICANT CHANGE UP (ref 27–34)
MCHC RBC-ENTMCNC: 32.8 GM/DL — SIGNIFICANT CHANGE UP (ref 32–36)
MCV RBC AUTO: 90.8 FL — SIGNIFICANT CHANGE UP (ref 80–100)
NRBC # BLD: 0 /100 WBCS — SIGNIFICANT CHANGE UP (ref 0–0)
NRBC # FLD: 0 K/UL — SIGNIFICANT CHANGE UP (ref 0–0)
PHOSPHATE SERPL-MCNC: 1.7 MG/DL — LOW (ref 2.5–4.5)
PLATELET # BLD AUTO: 296 K/UL — SIGNIFICANT CHANGE UP (ref 150–400)
POTASSIUM SERPL-MCNC: 4.4 MMOL/L — SIGNIFICANT CHANGE UP (ref 3.5–5.3)
POTASSIUM SERPL-SCNC: 4.4 MMOL/L — SIGNIFICANT CHANGE UP (ref 3.5–5.3)
RBC # BLD: 3.46 M/UL — LOW (ref 3.8–5.2)
RBC # FLD: 15.2 % — HIGH (ref 10.3–14.5)
SODIUM SERPL-SCNC: 135 MMOL/L — SIGNIFICANT CHANGE UP (ref 135–145)
WBC # BLD: 7.32 K/UL — SIGNIFICANT CHANGE UP (ref 3.8–10.5)
WBC # FLD AUTO: 7.32 K/UL — SIGNIFICANT CHANGE UP (ref 3.8–10.5)

## 2024-05-26 RX ORDER — SODIUM,POTASSIUM PHOSPHATES 278-250MG
1 POWDER IN PACKET (EA) ORAL ONCE
Refills: 0 | Status: COMPLETED | OUTPATIENT
Start: 2024-05-26 | End: 2024-05-26

## 2024-05-26 RX ORDER — ACETAMINOPHEN 500 MG
650 TABLET ORAL EVERY 6 HOURS
Refills: 0 | Status: DISCONTINUED | OUTPATIENT
Start: 2024-05-26 | End: 2024-05-29

## 2024-05-26 RX ADMIN — HEPARIN SODIUM 5000 UNIT(S): 5000 INJECTION INTRAVENOUS; SUBCUTANEOUS at 12:55

## 2024-05-26 RX ADMIN — HEPARIN SODIUM 5000 UNIT(S): 5000 INJECTION INTRAVENOUS; SUBCUTANEOUS at 21:59

## 2024-05-26 RX ADMIN — Medication 1 PACKET(S): at 09:57

## 2024-05-26 RX ADMIN — HEPARIN SODIUM 5000 UNIT(S): 5000 INJECTION INTRAVENOUS; SUBCUTANEOUS at 05:21

## 2024-05-26 RX ADMIN — LORATADINE 10 MILLIGRAM(S): 10 TABLET ORAL at 11:30

## 2024-05-26 RX ADMIN — Medication 650 MILLIGRAM(S): at 18:10

## 2024-05-26 RX ADMIN — DEXTROSE MONOHYDRATE, SODIUM CHLORIDE, AND POTASSIUM CHLORIDE 80 MILLILITER(S): 50; .745; 4.5 INJECTION, SOLUTION INTRAVENOUS at 05:39

## 2024-05-26 RX ADMIN — Medication 85 MILLIMOLE(S): at 11:29

## 2024-05-26 RX ADMIN — ESCITALOPRAM OXALATE 5 MILLIGRAM(S): 10 TABLET, FILM COATED ORAL at 11:29

## 2024-05-26 RX ADMIN — CHLORHEXIDINE GLUCONATE 1 APPLICATION(S): 213 SOLUTION TOPICAL at 11:30

## 2024-05-26 RX ADMIN — Medication 38 MICROGRAM(S): at 05:21

## 2024-05-26 NOTE — PROGRESS NOTE ADULT - ASSESSMENT
89 y/o female with pmhx of HTN, hypothyroidism, osteoporosis, hx of UTI, kidney stones, septic shock s/p cystoscopy, stone extraction and remote hx of subdural hematoma s/p craniotomy and SBO. 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 Of note, cecal mass noted to be dense and adhered to pelvic sidewall. Small nodularity in mesentery biopsies, negative for malignancy on frozen. Palpable mobile mass in sigmoid with tattoo palpable. Pt downgraded from SICU (5/23).      Plan  - CLD  - mIVF  - Antiemetics PRN   - Continue home meds  - DVTppx: SQH and SCDs    A team [id number] 89 y/o female with pmhx of HTN, hypothyroidism, osteoporosis, hx of UTI, kidney stones, septic shock s/p cystoscopy, stone extraction and remote hx of subdural hematoma s/p craniotomy and SBO. 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 Of note, cecal mass noted to be dense and adhered to pelvic sidewall. Small nodularity in mesentery biopsies, negative for malignancy on frozen. Palpable mobile mass in sigmoid with tattoo palpable. Pt downgraded from SICU (5/23).      Plan  - Keep on CLD  - mIVF  - Antiemetics PRN   - Continue home meds  - DVTppx: SQH and SCDs  - OOB w/ assistance    A team [id number]

## 2024-05-26 NOTE — PROGRESS NOTE ADULT - SUBJECTIVE AND OBJECTIVE BOX
A TEAM SURGERY DAILY PROGRESS NOTE:     INTERVAL EVENTS:    SUBJECTIVE/ROS: No acute events overnight. Patient seen and examined at bedside by surgical team.     OBJECTIVE:  Vital Signs Last 24 Hrs  T(C): 36.7 (26 May 2024 05:30), Max: 36.9 (25 May 2024 10:19)  T(F): 98 (26 May 2024 05:30), Max: 98.5 (25 May 2024 13:24)  HR: 76 (26 May 2024 05:30) (72 - 85)  BP: 135/56 (26 May 2024 05:30) (119/60 - 143/74)  BP(mean): --  RR: 16 (26 May 2024 05:30) (16 - 18)  SpO2: 99% (26 May 2024 05:30) (96% - 100%)    Parameters below as of 26 May 2024 05:30  Patient On (Oxygen Delivery Method): room air                            10.6   8.33  )-----------( 320      ( 25 May 2024 06:45 )             31.8     05-25    139  |  100  |  18  ----------------------------<  75  4.3   |  23  |  1.02    Ca    9.0      25 May 2024 06:45  Phos  3.4     05-25  Mg     2.20     05-25       I&O's Detail    24 May 2024 07:01  -  25 May 2024 07:00  --------------------------------------------------------  IN:  Total IN: 0 mL    OUT:    Ileostomy (mL): 390 mL    Nasogastric/Oral tube (mL): 150 mL    Voided (mL): 500 mL  Total OUT: 1040 mL    Total NET: -1040 mL      25 May 2024 07:01  -  26 May 2024 06:44  --------------------------------------------------------  IN:  Total IN: 0 mL    OUT:    Ileostomy (mL): 600 mL  Total OUT: 600 mL    Total NET: -600 mL          IMAGING:      PHYSICAL EXAM:  General: NAD, resting in bed comfortably, A&O2  Cardiac:  warm and well perfused  Respiratory: Nonlabored respirations, normal cw expansion.  Abdomen: soft, nontender, nondistended.  incision is c/d/i. R  ostomy + with bilious drainage to gravity.            A TEAM SURGERY DAILY PROGRESS NOTE:     INTERVAL EVENTS: None    SUBJECTIVE/ROS: No acute events overnight. Patient seen and examined at bedside by surgical team. Reports mild abdominal pain like cramp with eating, denies nausea/vomit, fever/chills, sob/chest pain.     OBJECTIVE:  Vital Signs Last 24 Hrs  T(C): 36.7 (26 May 2024 05:30), Max: 36.9 (25 May 2024 10:19)  T(F): 98 (26 May 2024 05:30), Max: 98.5 (25 May 2024 13:24)  HR: 76 (26 May 2024 05:30) (72 - 85)  BP: 135/56 (26 May 2024 05:30) (119/60 - 143/74)  BP(mean): --  RR: 16 (26 May 2024 05:30) (16 - 18)  SpO2: 99% (26 May 2024 05:30) (96% - 100%)    Parameters below as of 26 May 2024 05:30  Patient On (Oxygen Delivery Method): room air                            10.6   8.33  )-----------( 320      ( 25 May 2024 06:45 )             31.8     05-25    139  |  100  |  18  ----------------------------<  75  4.3   |  23  |  1.02    Ca    9.0      25 May 2024 06:45  Phos  3.4     05-25  Mg     2.20     05-25       I&O's Detail    24 May 2024 07:01  -  25 May 2024 07:00  --------------------------------------------------------  IN:  Total IN: 0 mL    OUT:    Ileostomy (mL): 390 mL    Nasogastric/Oral tube (mL): 150 mL    Voided (mL): 500 mL  Total OUT: 1040 mL    Total NET: -1040 mL      25 May 2024 07:01  -  26 May 2024 06:44  --------------------------------------------------------  IN:  Total IN: 0 mL    OUT:    Ileostomy (mL): 600 mL  Total OUT: 600 mL    Total NET: -600 mL          IMAGING:      PHYSICAL EXAM:  General: NAD, resting in bed comfortably, A&O2  Cardiac:  warm and well perfused  Respiratory: Nonlabored respirations, normal cw expansion.  Abdomen: soft, nontender, nondistended.  incision is c/d/i. R ileostomy +/+ to gravity w/ bilious color output.

## 2024-05-27 LAB
ANION GAP SERPL CALC-SCNC: 12 MMOL/L — SIGNIFICANT CHANGE UP (ref 7–14)
BASOPHILS # BLD AUTO: 0.13 K/UL — SIGNIFICANT CHANGE UP (ref 0–0.2)
BASOPHILS NFR BLD AUTO: 1.7 % — SIGNIFICANT CHANGE UP (ref 0–2)
BLD GP AB SCN SERPL QL: NEGATIVE — SIGNIFICANT CHANGE UP
BUN SERPL-MCNC: 10 MG/DL — SIGNIFICANT CHANGE UP (ref 7–23)
CALCIUM SERPL-MCNC: 8.6 MG/DL — SIGNIFICANT CHANGE UP (ref 8.4–10.5)
CHLORIDE SERPL-SCNC: 99 MMOL/L — SIGNIFICANT CHANGE UP (ref 98–107)
CO2 SERPL-SCNC: 22 MMOL/L — SIGNIFICANT CHANGE UP (ref 22–31)
CREAT SERPL-MCNC: 0.8 MG/DL — SIGNIFICANT CHANGE UP (ref 0.5–1.3)
EGFR: 72 ML/MIN/1.73M2 — SIGNIFICANT CHANGE UP
EOSINOPHIL # BLD AUTO: 0.07 K/UL — SIGNIFICANT CHANGE UP (ref 0–0.5)
EOSINOPHIL NFR BLD AUTO: 0.9 % — SIGNIFICANT CHANGE UP (ref 0–6)
GLUCOSE BLDC GLUCOMTR-MCNC: 100 MG/DL — HIGH (ref 70–99)
GLUCOSE BLDC GLUCOMTR-MCNC: 105 MG/DL — HIGH (ref 70–99)
GLUCOSE BLDC GLUCOMTR-MCNC: 94 MG/DL — SIGNIFICANT CHANGE UP (ref 70–99)
GLUCOSE SERPL-MCNC: 103 MG/DL — HIGH (ref 70–99)
HCT VFR BLD CALC: 30.7 % — LOW (ref 34.5–45)
HGB BLD-MCNC: 10.7 G/DL — LOW (ref 11.5–15.5)
IANC: 5.44 K/UL — SIGNIFICANT CHANGE UP (ref 1.8–7.4)
LYMPHOCYTES # BLD AUTO: 0.55 K/UL — LOW (ref 1–3.3)
LYMPHOCYTES # BLD AUTO: 7 % — LOW (ref 13–44)
MAGNESIUM SERPL-MCNC: 1.7 MG/DL — SIGNIFICANT CHANGE UP (ref 1.6–2.6)
MANUAL SMEAR VERIFICATION: SIGNIFICANT CHANGE UP
MCHC RBC-ENTMCNC: 30.1 PG — SIGNIFICANT CHANGE UP (ref 27–34)
MCHC RBC-ENTMCNC: 34.9 GM/DL — SIGNIFICANT CHANGE UP (ref 32–36)
MCV RBC AUTO: 86.2 FL — SIGNIFICANT CHANGE UP (ref 80–100)
MONOCYTES # BLD AUTO: 0.69 K/UL — SIGNIFICANT CHANGE UP (ref 0–0.9)
MONOCYTES NFR BLD AUTO: 8.8 % — SIGNIFICANT CHANGE UP (ref 2–14)
NEUTROPHILS # BLD AUTO: 6.41 K/UL — SIGNIFICANT CHANGE UP (ref 1.8–7.4)
NEUTROPHILS NFR BLD AUTO: 73.7 % — SIGNIFICANT CHANGE UP (ref 43–77)
NEUTS BAND # BLD: 7.9 % — HIGH (ref 0–6)
PHOSPHATE SERPL-MCNC: 3.5 MG/DL — SIGNIFICANT CHANGE UP (ref 2.5–4.5)
PLAT MORPH BLD: NORMAL — SIGNIFICANT CHANGE UP
PLATELET # BLD AUTO: 339 K/UL — SIGNIFICANT CHANGE UP (ref 150–400)
PLATELET COUNT - ESTIMATE: NORMAL — SIGNIFICANT CHANGE UP
POTASSIUM SERPL-MCNC: 4.5 MMOL/L — SIGNIFICANT CHANGE UP (ref 3.5–5.3)
POTASSIUM SERPL-SCNC: 4.5 MMOL/L — SIGNIFICANT CHANGE UP (ref 3.5–5.3)
RBC # BLD: 3.56 M/UL — LOW (ref 3.8–5.2)
RBC # FLD: 14.5 % — SIGNIFICANT CHANGE UP (ref 10.3–14.5)
RBC BLD AUTO: NORMAL — SIGNIFICANT CHANGE UP
RH IG SCN BLD-IMP: POSITIVE — SIGNIFICANT CHANGE UP
SODIUM SERPL-SCNC: 133 MMOL/L — LOW (ref 135–145)
WBC # BLD: 7.85 K/UL — SIGNIFICANT CHANGE UP (ref 3.8–10.5)
WBC # FLD AUTO: 7.85 K/UL — SIGNIFICANT CHANGE UP (ref 3.8–10.5)

## 2024-05-27 RX ORDER — LEVOTHYROXINE SODIUM 125 MCG
50 TABLET ORAL DAILY
Refills: 0 | Status: DISCONTINUED | OUTPATIENT
Start: 2024-05-27 | End: 2024-05-29

## 2024-05-27 RX ORDER — MAGNESIUM SULFATE 500 MG/ML
1 VIAL (ML) INJECTION ONCE
Refills: 0 | Status: COMPLETED | OUTPATIENT
Start: 2024-05-27 | End: 2024-05-27

## 2024-05-27 RX ADMIN — HEPARIN SODIUM 5000 UNIT(S): 5000 INJECTION INTRAVENOUS; SUBCUTANEOUS at 13:20

## 2024-05-27 RX ADMIN — Medication 650 MILLIGRAM(S): at 13:20

## 2024-05-27 RX ADMIN — Medication 650 MILLIGRAM(S): at 06:11

## 2024-05-27 RX ADMIN — ESCITALOPRAM OXALATE 5 MILLIGRAM(S): 10 TABLET, FILM COATED ORAL at 13:20

## 2024-05-27 RX ADMIN — Medication 100 GRAM(S): at 10:18

## 2024-05-27 RX ADMIN — NEBIVOLOL HYDROCHLORIDE 2.5 MILLIGRAM(S): 5 TABLET ORAL at 06:12

## 2024-05-27 RX ADMIN — Medication 38 MICROGRAM(S): at 06:12

## 2024-05-27 RX ADMIN — LORATADINE 10 MILLIGRAM(S): 10 TABLET ORAL at 13:20

## 2024-05-27 RX ADMIN — DEXTROSE MONOHYDRATE, SODIUM CHLORIDE, AND POTASSIUM CHLORIDE 80 MILLILITER(S): 50; .745; 4.5 INJECTION, SOLUTION INTRAVENOUS at 06:12

## 2024-05-27 RX ADMIN — HEPARIN SODIUM 5000 UNIT(S): 5000 INJECTION INTRAVENOUS; SUBCUTANEOUS at 06:11

## 2024-05-27 NOTE — PROGRESS NOTE ADULT - SUBJECTIVE AND OBJECTIVE BOX
A TEAM SURGERY DAILY PROGRESS NOTE:     INTERVAL EVENTS: None    SUBJECTIVE/ROS: Patient seen and examined at bedside by surgical team. Denies nausea/vomit, fever/chills, sob/chest pain. Provides limited information given baseline altered mental status. Reports that will like to try food.      OBJECTIVE:  Vital Signs Last 24 Hrs  T(C): 37.1 (27 May 2024 06:07), Max: 37.2 (26 May 2024 16:29)  T(F): 98.7 (27 May 2024 06:07), Max: 98.9 (26 May 2024 16:29)  HR: 75 (27 May 2024 06:07) (71 - 97)  BP: 154/79 (27 May 2024 06:07) (130/72 - 154/79)  BP(mean): --  RR: 18 (27 May 2024 06:07) (16 - 18)  SpO2: 97% (27 May 2024 06:07) (95% - 98%)    Parameters below as of 27 May 2024 06:07  Patient On (Oxygen Delivery Method): room air                            10.7   7.85  )-----------( 339      ( 27 May 2024 03:05 )             30.7     05-27    133<L>  |  99  |  10  ----------------------------<  103<H>  4.5   |  22  |  0.80    Ca    8.6      27 May 2024 03:05  Phos  3.5     05-27  Mg     1.70     05-27       I&O's Detail    26 May 2024 07:01  -  27 May 2024 07:00  --------------------------------------------------------  IN:  Total IN: 0 mL    OUT:    Ileostomy (mL): 400 mL    Voided (mL): 500 mL  Total OUT: 900 mL    Total NET: -900 mL          IMAGING:      PHYSICAL EXAM:  Constitutional: NAD  Respiratory: non-labored breathing, patent airway  Gastrointestinal: abdomen soft, nontender, nondistended, ileostomy w/ gas and bilious output.   Extremities: warm  Neurological: intact

## 2024-05-27 NOTE — PROGRESS NOTE ADULT - ASSESSMENT
87 y/o female with pmhx of HTN, hypothyroidism, osteoporosis, hx of UTI, kidney stones, septic shock s/p cystoscopy, stone extraction and remote hx of subdural hematoma s/p craniotomy and SBO. 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 Of note, cecal mass noted to be dense and adhered to pelvic sidewall. Small nodularity in mesentery biopsies, negative for malignancy on frozen. Palpable mobile mass in sigmoid with tattoo palpable. Pt downgraded from SICU (5/23).      Plan  - ADAT  - mIVF  - Antiemetics PRN   - Continue home meds  - DVTppx: SQH and SCDs  - OOB w/ assistance  - Dispo: begin [facility] planning    A team [id number]   89 y/o female with pmhx of HTN, hypothyroidism, osteoporosis, hx of UTI, kidney stones, septic shock s/p cystoscopy, stone extraction and remote hx of subdural hematoma s/p craniotomy and SBO. 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 Of note, cecal mass noted to be dense and adhered to pelvic sidewall. Small nodularity in mesentery biopsies, negative for malignancy on frozen. Palpable mobile mass in sigmoid with tattoo palpable. Pt downgraded from SICU (5/23).      Plan  - ADAT, LRD  - IVL  - Antiemetics PRN   - Continue home meds PO  - DVTppx: SQH and SCDs  - OOB w/ assistance  - Dispo: [facility] planning vs. rehab    A team [id number]

## 2024-05-28 LAB
ANION GAP SERPL CALC-SCNC: 11 MMOL/L — SIGNIFICANT CHANGE UP (ref 7–14)
ANION GAP SERPL CALC-SCNC: 11 MMOL/L — SIGNIFICANT CHANGE UP (ref 7–14)
BASOPHILS # BLD AUTO: 0.03 K/UL — SIGNIFICANT CHANGE UP (ref 0–0.2)
BASOPHILS NFR BLD AUTO: 0.4 % — SIGNIFICANT CHANGE UP (ref 0–2)
BUN SERPL-MCNC: 19 MG/DL — SIGNIFICANT CHANGE UP (ref 7–23)
BUN SERPL-MCNC: 23 MG/DL — SIGNIFICANT CHANGE UP (ref 7–23)
CALCIUM SERPL-MCNC: 8.9 MG/DL — SIGNIFICANT CHANGE UP (ref 8.4–10.5)
CALCIUM SERPL-MCNC: 9.2 MG/DL — SIGNIFICANT CHANGE UP (ref 8.4–10.5)
CHLORIDE SERPL-SCNC: 101 MMOL/L — SIGNIFICANT CHANGE UP (ref 98–107)
CHLORIDE SERPL-SCNC: 99 MMOL/L — SIGNIFICANT CHANGE UP (ref 98–107)
CO2 SERPL-SCNC: 22 MMOL/L — SIGNIFICANT CHANGE UP (ref 22–31)
CO2 SERPL-SCNC: 23 MMOL/L — SIGNIFICANT CHANGE UP (ref 22–31)
CREAT SERPL-MCNC: 1.06 MG/DL — SIGNIFICANT CHANGE UP (ref 0.5–1.3)
CREAT SERPL-MCNC: 1.08 MG/DL — SIGNIFICANT CHANGE UP (ref 0.5–1.3)
EGFR: 50 ML/MIN/1.73M2 — LOW
EGFR: 51 ML/MIN/1.73M2 — LOW
EOSINOPHIL # BLD AUTO: 0.1 K/UL — SIGNIFICANT CHANGE UP (ref 0–0.5)
EOSINOPHIL NFR BLD AUTO: 1.3 % — SIGNIFICANT CHANGE UP (ref 0–6)
GLUCOSE SERPL-MCNC: 103 MG/DL — HIGH (ref 70–99)
GLUCOSE SERPL-MCNC: 133 MG/DL — HIGH (ref 70–99)
HCT VFR BLD CALC: 32.9 % — LOW (ref 34.5–45)
HGB BLD-MCNC: 10.9 G/DL — LOW (ref 11.5–15.5)
IANC: 5.22 K/UL — SIGNIFICANT CHANGE UP (ref 1.8–7.4)
IMM GRANULOCYTES NFR BLD AUTO: 1.2 % — HIGH (ref 0–0.9)
LYMPHOCYTES # BLD AUTO: 1.06 K/UL — SIGNIFICANT CHANGE UP (ref 1–3.3)
LYMPHOCYTES # BLD AUTO: 13.9 % — SIGNIFICANT CHANGE UP (ref 13–44)
MAGNESIUM SERPL-MCNC: 2.1 MG/DL — SIGNIFICANT CHANGE UP (ref 1.6–2.6)
MAGNESIUM SERPL-MCNC: 2.2 MG/DL — SIGNIFICANT CHANGE UP (ref 1.6–2.6)
MCHC RBC-ENTMCNC: 29.2 PG — SIGNIFICANT CHANGE UP (ref 27–34)
MCHC RBC-ENTMCNC: 33.1 GM/DL — SIGNIFICANT CHANGE UP (ref 32–36)
MCV RBC AUTO: 88.2 FL — SIGNIFICANT CHANGE UP (ref 80–100)
MONOCYTES # BLD AUTO: 1.15 K/UL — HIGH (ref 0–0.9)
MONOCYTES NFR BLD AUTO: 15 % — HIGH (ref 2–14)
NEUTROPHILS # BLD AUTO: 5.22 K/UL — SIGNIFICANT CHANGE UP (ref 1.8–7.4)
NEUTROPHILS NFR BLD AUTO: 68.2 % — SIGNIFICANT CHANGE UP (ref 43–77)
NRBC # BLD: 0 /100 WBCS — SIGNIFICANT CHANGE UP (ref 0–0)
NRBC # FLD: 0 K/UL — SIGNIFICANT CHANGE UP (ref 0–0)
PHOSPHATE SERPL-MCNC: 2.8 MG/DL — SIGNIFICANT CHANGE UP (ref 2.5–4.5)
PHOSPHATE SERPL-MCNC: 3.3 MG/DL — SIGNIFICANT CHANGE UP (ref 2.5–4.5)
PLATELET # BLD AUTO: 427 K/UL — HIGH (ref 150–400)
POTASSIUM SERPL-MCNC: 4.6 MMOL/L — SIGNIFICANT CHANGE UP (ref 3.5–5.3)
POTASSIUM SERPL-MCNC: 4.6 MMOL/L — SIGNIFICANT CHANGE UP (ref 3.5–5.3)
POTASSIUM SERPL-SCNC: 4.6 MMOL/L — SIGNIFICANT CHANGE UP (ref 3.5–5.3)
POTASSIUM SERPL-SCNC: 4.6 MMOL/L — SIGNIFICANT CHANGE UP (ref 3.5–5.3)
RBC # BLD: 3.73 M/UL — LOW (ref 3.8–5.2)
RBC # FLD: 14.6 % — HIGH (ref 10.3–14.5)
SODIUM SERPL-SCNC: 132 MMOL/L — LOW (ref 135–145)
SODIUM SERPL-SCNC: 135 MMOL/L — SIGNIFICANT CHANGE UP (ref 135–145)
WBC # BLD: 7.65 K/UL — SIGNIFICANT CHANGE UP (ref 3.8–10.5)
WBC # FLD AUTO: 7.65 K/UL — SIGNIFICANT CHANGE UP (ref 3.8–10.5)

## 2024-05-28 RX ORDER — SODIUM CHLORIDE 9 MG/ML
500 INJECTION INTRAMUSCULAR; INTRAVENOUS; SUBCUTANEOUS ONCE
Refills: 0 | Status: COMPLETED | OUTPATIENT
Start: 2024-05-28 | End: 2024-05-28

## 2024-05-28 RX ORDER — NEBIVOLOL HYDROCHLORIDE 5 MG/1
1 TABLET ORAL
Qty: 30 | Refills: 0
Start: 2024-05-28 | End: 2024-06-26

## 2024-05-28 RX ORDER — NEBIVOLOL HYDROCHLORIDE 5 MG/1
2.5 TABLET ORAL DAILY
Refills: 0 | Status: DISCONTINUED | OUTPATIENT
Start: 2024-05-28 | End: 2024-05-29

## 2024-05-28 RX ADMIN — Medication 50 MICROGRAM(S): at 05:30

## 2024-05-28 RX ADMIN — ESCITALOPRAM OXALATE 5 MILLIGRAM(S): 10 TABLET, FILM COATED ORAL at 13:10

## 2024-05-28 RX ADMIN — LORATADINE 10 MILLIGRAM(S): 10 TABLET ORAL at 13:10

## 2024-05-28 RX ADMIN — HEPARIN SODIUM 5000 UNIT(S): 5000 INJECTION INTRAVENOUS; SUBCUTANEOUS at 21:52

## 2024-05-28 RX ADMIN — HEPARIN SODIUM 5000 UNIT(S): 5000 INJECTION INTRAVENOUS; SUBCUTANEOUS at 13:10

## 2024-05-28 RX ADMIN — Medication 650 MILLIGRAM(S): at 05:31

## 2024-05-28 RX ADMIN — SODIUM CHLORIDE 250 MILLILITER(S): 9 INJECTION INTRAMUSCULAR; INTRAVENOUS; SUBCUTANEOUS at 11:03

## 2024-05-28 RX ADMIN — Medication 650 MILLIGRAM(S): at 11:28

## 2024-05-28 RX ADMIN — HEPARIN SODIUM 5000 UNIT(S): 5000 INJECTION INTRAVENOUS; SUBCUTANEOUS at 05:30

## 2024-05-28 NOTE — PROGRESS NOTE ADULT - ASSESSMENT
89 y/o female with pmhx of HTN, hypothyroidism, osteoporosis, hx of UTI, kidney stones, septic shock s/p cystoscopy, stone extraction and remote hx of subdural hematoma s/p craniotomy and SBO. 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 Of note, cecal mass noted to be dense and adhered to pelvic sidewall. Small nodularity in mesentery biopsies, negative for malignancy on frozen. Palpable mobile mass in sigmoid with tattoo palpable. Pt downgraded from SICU (5/23).      Plan  - Discharge planning to assisted today  - LRD  - Antiemetics PRN   - Continue home meds PO  - DVTppx: SQH and SCDs  - OOB w/ assistance    A team [id number]

## 2024-05-28 NOTE — PROGRESS NOTE ADULT - SUBJECTIVE AND OBJECTIVE BOX
A TEAM SURGERY DAILY PROGRESS NOTE:     INTERVAL EVENTS:    SUBJECTIVE/ROS: No acute events overnight. Patient seen and examined at bedside by surgical team.     OBJECTIVE:  Vital Signs Last 24 Hrs  T(C): 37.1 (28 May 2024 01:54), Max: 37.1 (27 May 2024 06:07)  T(F): 98.8 (28 May 2024 01:54), Max: 98.8 (27 May 2024 11:15)  HR: 85 (28 May 2024 01:54) (68 - 85)  BP: 136/66 (28 May 2024 01:54) (136/66 - 158/67)  BP(mean): --  RR: 18 (28 May 2024 01:54) (16 - 19)  SpO2: 99% (28 May 2024 01:54) (96% - 99%)    Parameters below as of 28 May 2024 01:54  Patient On (Oxygen Delivery Method): room air                            10.7   7.85  )-----------( 339      ( 27 May 2024 03:05 )             30.7     05-27    133<L>  |  99  |  10  ----------------------------<  103<H>  4.5   |  22  |  0.80    Ca    8.6      27 May 2024 03:05  Phos  3.5     05-27  Mg     1.70     05-27       I&O's Detail    26 May 2024 07:01  -  27 May 2024 07:00  --------------------------------------------------------  IN:  Total IN: 0 mL    OUT:    Ileostomy (mL): 400 mL    Voided (mL): 500 mL  Total OUT: 900 mL    Total NET: -900 mL      27 May 2024 07:01  -  28 May 2024 05:49  --------------------------------------------------------  IN:    Oral Fluid: 240 mL  Total IN: 240 mL    OUT:    Ileostomy (mL): 650 mL    Voided (mL): 900 mL  Total OUT: 1550 mL    Total NET: -1310 mL          IMAGING:      PHYSICAL EXAM:  Constitutional: NAD  Respiratory: non-labored breathing, patent airway  Gastrointestinal: abdomen soft, nontender, nondistended, ileostomy w/ gas and bilious output.   Extremities: warm  Neurological: intact

## 2024-05-29 ENCOUNTER — TRANSCRIPTION ENCOUNTER (OUTPATIENT)
Age: 89
End: 2024-05-29

## 2024-05-29 VITALS
TEMPERATURE: 98 F | HEART RATE: 82 BPM | RESPIRATION RATE: 18 BRPM | SYSTOLIC BLOOD PRESSURE: 114 MMHG | OXYGEN SATURATION: 98 % | DIASTOLIC BLOOD PRESSURE: 62 MMHG

## 2024-05-29 LAB
ANION GAP SERPL CALC-SCNC: 10 MMOL/L — SIGNIFICANT CHANGE UP (ref 7–14)
BASOPHILS # BLD AUTO: 0.04 K/UL — SIGNIFICANT CHANGE UP (ref 0–0.2)
BASOPHILS NFR BLD AUTO: 0.5 % — SIGNIFICANT CHANGE UP (ref 0–2)
BUN SERPL-MCNC: 28 MG/DL — HIGH (ref 7–23)
CALCIUM SERPL-MCNC: 9.3 MG/DL — SIGNIFICANT CHANGE UP (ref 8.4–10.5)
CHLORIDE SERPL-SCNC: 100 MMOL/L — SIGNIFICANT CHANGE UP (ref 98–107)
CO2 SERPL-SCNC: 23 MMOL/L — SIGNIFICANT CHANGE UP (ref 22–31)
CREAT SERPL-MCNC: 1.04 MG/DL — SIGNIFICANT CHANGE UP (ref 0.5–1.3)
EGFR: 52 ML/MIN/1.73M2 — LOW
EOSINOPHIL # BLD AUTO: 0.16 K/UL — SIGNIFICANT CHANGE UP (ref 0–0.5)
EOSINOPHIL NFR BLD AUTO: 2 % — SIGNIFICANT CHANGE UP (ref 0–6)
GLUCOSE SERPL-MCNC: 92 MG/DL — SIGNIFICANT CHANGE UP (ref 70–99)
HCT VFR BLD CALC: 28.8 % — LOW (ref 34.5–45)
HGB BLD-MCNC: 9.5 G/DL — LOW (ref 11.5–15.5)
IANC: 4.52 K/UL — SIGNIFICANT CHANGE UP (ref 1.8–7.4)
IMM GRANULOCYTES NFR BLD AUTO: 3.7 % — HIGH (ref 0–0.9)
LYMPHOCYTES # BLD AUTO: 1.69 K/UL — SIGNIFICANT CHANGE UP (ref 1–3.3)
LYMPHOCYTES # BLD AUTO: 21.6 % — SIGNIFICANT CHANGE UP (ref 13–44)
MAGNESIUM SERPL-MCNC: 2.1 MG/DL — SIGNIFICANT CHANGE UP (ref 1.6–2.6)
MCHC RBC-ENTMCNC: 29.3 PG — SIGNIFICANT CHANGE UP (ref 27–34)
MCHC RBC-ENTMCNC: 33 GM/DL — SIGNIFICANT CHANGE UP (ref 32–36)
MCV RBC AUTO: 88.9 FL — SIGNIFICANT CHANGE UP (ref 80–100)
MONOCYTES # BLD AUTO: 1.11 K/UL — HIGH (ref 0–0.9)
MONOCYTES NFR BLD AUTO: 14.2 % — HIGH (ref 2–14)
NEUTROPHILS # BLD AUTO: 4.52 K/UL — SIGNIFICANT CHANGE UP (ref 1.8–7.4)
NEUTROPHILS NFR BLD AUTO: 58 % — SIGNIFICANT CHANGE UP (ref 43–77)
NRBC # BLD: 0 /100 WBCS — SIGNIFICANT CHANGE UP (ref 0–0)
NRBC # FLD: 0 K/UL — SIGNIFICANT CHANGE UP (ref 0–0)
PHOSPHATE SERPL-MCNC: 2.8 MG/DL — SIGNIFICANT CHANGE UP (ref 2.5–4.5)
PLATELET # BLD AUTO: 433 K/UL — HIGH (ref 150–400)
POTASSIUM SERPL-MCNC: 4.7 MMOL/L — SIGNIFICANT CHANGE UP (ref 3.5–5.3)
POTASSIUM SERPL-SCNC: 4.7 MMOL/L — SIGNIFICANT CHANGE UP (ref 3.5–5.3)
RBC # BLD: 3.24 M/UL — LOW (ref 3.8–5.2)
RBC # FLD: 14.7 % — HIGH (ref 10.3–14.5)
SARS-COV-2 RNA SPEC QL NAA+PROBE: SIGNIFICANT CHANGE UP
SODIUM SERPL-SCNC: 133 MMOL/L — LOW (ref 135–145)
WBC # BLD: 7.81 K/UL — SIGNIFICANT CHANGE UP (ref 3.8–10.5)
WBC # FLD AUTO: 7.81 K/UL — SIGNIFICANT CHANGE UP (ref 3.8–10.5)

## 2024-05-29 RX ORDER — LOPERAMIDE HCL 2 MG
1 TABLET ORAL
Qty: 30 | Refills: 1
Start: 2024-05-29 | End: 2024-07-27

## 2024-05-29 RX ORDER — LOPERAMIDE HCL 2 MG
2 TABLET ORAL DAILY
Refills: 0 | Status: DISCONTINUED | OUTPATIENT
Start: 2024-05-29 | End: 2024-05-29

## 2024-05-29 RX ORDER — NEBIVOLOL HYDROCHLORIDE 5 MG/1
1 TABLET ORAL
Refills: 0 | DISCHARGE

## 2024-05-29 RX ORDER — ACETAMINOPHEN 500 MG
2 TABLET ORAL
Qty: 0 | Refills: 0 | DISCHARGE
Start: 2024-05-29

## 2024-05-29 RX ORDER — SODIUM CHLORIDE 9 MG/ML
500 INJECTION INTRAMUSCULAR; INTRAVENOUS; SUBCUTANEOUS ONCE
Refills: 0 | Status: COMPLETED | OUTPATIENT
Start: 2024-05-29 | End: 2024-05-29

## 2024-05-29 RX ADMIN — Medication 2 MILLIGRAM(S): at 09:31

## 2024-05-29 RX ADMIN — Medication 50 MICROGRAM(S): at 04:21

## 2024-05-29 RX ADMIN — LORATADINE 10 MILLIGRAM(S): 10 TABLET ORAL at 12:55

## 2024-05-29 RX ADMIN — ESCITALOPRAM OXALATE 5 MILLIGRAM(S): 10 TABLET, FILM COATED ORAL at 12:55

## 2024-05-29 RX ADMIN — Medication 650 MILLIGRAM(S): at 05:02

## 2024-05-29 RX ADMIN — Medication 650 MILLIGRAM(S): at 00:00

## 2024-05-29 RX ADMIN — SODIUM CHLORIDE 500 MILLILITER(S): 9 INJECTION INTRAMUSCULAR; INTRAVENOUS; SUBCUTANEOUS at 09:31

## 2024-05-29 RX ADMIN — HEPARIN SODIUM 5000 UNIT(S): 5000 INJECTION INTRAVENOUS; SUBCUTANEOUS at 05:02

## 2024-05-29 NOTE — DISCHARGE NOTE NURSING/CASE MANAGEMENT/SOCIAL WORK - NSDCPEFALRISK_GEN_ALL_CORE
For information on Fall & Injury Prevention, visit: [email or web address] OR  [email or web address] OR  https://www.cdc.gov/steadi/patient.html

## 2024-05-29 NOTE — PROGRESS NOTE ADULT - PROVIDER SPECIALTY LIST ADULT
Anesthesia
Colorectal Surgery
Surgery
Colorectal Surgery
Colorectal Surgery
SICU
Colorectal Surgery
SICU
Surgery
Surgery

## 2024-05-29 NOTE — PROGRESS NOTE ADULT - SUBJECTIVE AND OBJECTIVE BOX
A TEAM SURGERY DAILY PROGRESS NOTE:     INTERVAL EVENTS:    SUBJECTIVE/ROS: No acute events overnight. Patient seen and examined at bedside by surgical team.     OBJECTIVE:  Vital Signs Last 24 Hrs  T(C): 36.6 (29 May 2024 06:00), Max: 36.9 (28 May 2024 06:21)  T(F): 97.9 (29 May 2024 06:00), Max: 98.5 (28 May 2024 06:21)  HR: 78 (29 May 2024 06:00) (76 - 92)  BP: 127/58 (29 May 2024 06:00) (118/60 - 148/67)  BP(mean): --  RR: 18 (29 May 2024 06:00) (17 - 19)  SpO2: 98% (29 May 2024 06:00) (96% - 100%)    Parameters below as of 29 May 2024 06:00  Patient On (Oxygen Delivery Method): room air                            10.9   7.65  )-----------( 427      ( 28 May 2024 06:46 )             32.9     05-28    135  |  101  |  23  ----------------------------<  133<H>  4.6   |  23  |  1.08    Ca    8.9      28 May 2024 13:40  Phos  2.8     05-28  Mg     2.10     05-28       I&O's Detail    27 May 2024 07:01  -  28 May 2024 07:00  --------------------------------------------------------  IN:    Oral Fluid: 480 mL  Total IN: 480 mL    OUT:    Ileostomy (mL): 850 mL    Voided (mL): 1100 mL  Total OUT: 1950 mL    Total NET: -1470 mL      28 May 2024 07:01  -  29 May 2024 06:09  --------------------------------------------------------  IN:    Oral Fluid: 200 mL  Total IN: 200 mL    OUT:    Ileostomy (mL): 700 mL    Voided (mL): 950 mL  Total OUT: 1650 mL    Total NET: -1450 mL          IMAGING:      PHYSICAL EXAM:  Constitutional: NAD  Respiratory: non-labored breathing, patent airway  Gastrointestinal: abdomen soft, nontender, nondistended, ileostomy w/ gas and bilious output.   Extremities: warm  Neurological: intact

## 2024-05-29 NOTE — PROGRESS NOTE ADULT - ASSESSMENT
87 y/o female with pmhx of HTN, hypothyroidism, osteoporosis, hx of UTI, kidney stones, septic shock s/p cystoscopy, stone extraction and remote hx of subdural hematoma s/p craniotomy and SBO. 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 Of note, cecal mass noted to be dense and adhered to pelvic sidewall. Small nodularity in mesentery biopsies, negative for malignancy on frozen. Palpable mobile mass in sigmoid with tattoo palpable. Pt downgraded from SICU (5/23). Cleared for discharge.      Plan  - Discharge to senior living today  - LRD  - Antiemetics PRN   - Continue home meds PO  - DVTppx: SQH and SCDs  - OOB w/ assistance    A team [id number]

## 2024-05-29 NOTE — PROGRESS NOTE ADULT - NUTRITIONAL ASSESSMENT
This patient has been assessed with a concern for Malnutrition and has been determined to have a diagnosis/diagnoses of Moderate protein-calorie malnutrition and Underweight (BMI < 19).    This patient is being managed with:   Diet Low Fiber-  Supplement Feeding Modality:  Oral  Ensure Enlive Cans or Servings Per Day:  1       Frequency:  Three Times a day  Entered: May 27 2024  7:59AM  
This patient has been assessed with a concern for Malnutrition and has been determined to have a diagnosis/diagnoses of Moderate protein-calorie malnutrition and Underweight (BMI < 19).    This patient is being managed with:   Diet NPO-  Except Medications  Entered: May 23 2024  6:12AM  
This patient has been assessed with a concern for Malnutrition and has been determined to have a diagnosis/diagnoses of Moderate protein-calorie malnutrition and Underweight (BMI < 19).    This patient is being managed with:   Diet Clear Liquid-  Entered: May 25 2024  5:46PM  
This patient has been assessed with a concern for Malnutrition and has been determined to have a diagnosis/diagnoses of Moderate protein-calorie malnutrition and Underweight (BMI < 19).    This patient is being managed with:   Diet NPO-  Entered: May 23 2024 12:35PM  
This patient has been assessed with a concern for Malnutrition and has been determined to have a diagnosis/diagnoses of Moderate protein-calorie malnutrition and Underweight (BMI < 19).    This patient is being managed with:   Diet Low Fiber-  Supplement Feeding Modality:  Oral  Ensure Enlive Cans or Servings Per Day:  1       Frequency:  Three Times a day  Entered: May 27 2024  7:59AM  
This patient has been assessed with a concern for Malnutrition and has been determined to have a diagnosis/diagnoses of Moderate protein-calorie malnutrition and Underweight (BMI < 19).    This patient is being managed with:   Diet Clear Liquid-  Entered: May 25 2024  5:46PM  
This patient has been assessed with a concern for Malnutrition and has been determined to have a diagnosis/diagnoses of Moderate protein-calorie malnutrition and Underweight (BMI < 19).    This patient is being managed with:   Diet NPO-  Entered: May 24 2024  9:41PM

## 2024-05-29 NOTE — DISCHARGE NOTE NURSING/CASE MANAGEMENT/SOCIAL WORK - PATIENT PORTAL LINK FT
You can access the FollowMyHealth Patient Portal offered by [facility] by registering at the following website: [email or web address]. By joining [email or web address]’s FollowMyHealth portal, you will also be able to view your health information using other applications (apps) compatible with our system. Negative

## 2024-05-31 PROBLEM — M81.0 AGE-RELATED OSTEOPOROSIS WITHOUT CURRENT PATHOLOGICAL FRACTURE: Chronic | Status: ACTIVE | Noted: 2024-05-16

## 2024-05-31 PROBLEM — F32.9 MAJOR DEPRESSIVE DISORDER, SINGLE EPISODE, UNSPECIFIED: Chronic | Status: ACTIVE | Noted: 2024-05-16

## 2024-05-31 PROBLEM — Z86.79 PERSONAL HISTORY OF OTHER DISEASES OF THE CIRCULATORY SYSTEM: Chronic | Status: ACTIVE | Noted: 2024-05-16

## 2024-05-31 PROBLEM — D49.0 NEOPLASM OF UNSPECIFIED BEHAVIOR OF DIGESTIVE SYSTEM: Chronic | Status: ACTIVE | Noted: 2024-05-16

## 2024-05-31 PROBLEM — I10 ESSENTIAL (PRIMARY) HYPERTENSION: Chronic | Status: ACTIVE | Noted: 2024-05-16

## 2024-05-31 PROBLEM — E03.9 HYPOTHYROIDISM, UNSPECIFIED: Chronic | Status: ACTIVE | Noted: 2024-05-16

## 2024-05-31 LAB — SURGICAL PATHOLOGY STUDY: SIGNIFICANT CHANGE UP

## 2024-06-11 ENCOUNTER — APPOINTMENT (OUTPATIENT)
Dept: COLORECTAL SURGERY | Facility: CLINIC | Age: 89
End: 2024-06-11
Payer: MEDICARE

## 2024-06-11 DIAGNOSIS — C18.9 MALIGNANT NEOPLASM OF COLON, UNSPECIFIED: ICD-10-CM

## 2024-06-11 PROCEDURE — 99024 POSTOP FOLLOW-UP VISIT: CPT

## 2024-06-11 NOTE — HISTORY OF PRESENT ILLNESS
[FreeTextEntry1] : Status post laparoscopic-assisted Abdominal colectomy for obstructing cecal mass with large sigmoid colon polyp. Patient progressing well. Tolerating diet stoma functioning. No fevers or chills

## 2024-06-11 NOTE — ASSESSMENT
[FreeTextEntry1] : Right colon cancer with large sigmoid polyp -Pathology reviewed T4bN1 (3/20)Mx -Diet as tolerated -Increase activity -Recommend patient be evaluated by medical oncology, however I do not believe adjuvant therapy would be tolerated.  Referral given -Follow up in 4 weeks for reevaluation

## 2024-06-18 ENCOUNTER — OUTPATIENT (OUTPATIENT)
Dept: OUTPATIENT SERVICES | Facility: HOSPITAL | Age: 89
LOS: 1 days | Discharge: HOME | End: 2024-06-18

## 2024-06-18 DIAGNOSIS — Z98.890 OTHER SPECIFIED POSTPROCEDURAL STATES: Chronic | ICD-10-CM

## 2024-06-18 DIAGNOSIS — C18.9 MALIGNANT NEOPLASM OF COLON, UNSPECIFIED: ICD-10-CM

## 2024-06-21 ENCOUNTER — NON-APPOINTMENT (OUTPATIENT)
Age: 89
End: 2024-06-21

## 2024-07-08 ENCOUNTER — APPOINTMENT (OUTPATIENT)
Dept: HEMATOLOGY ONCOLOGY | Facility: CLINIC | Age: 89
End: 2024-07-08
Payer: MEDICARE

## 2024-07-08 ENCOUNTER — NON-APPOINTMENT (OUTPATIENT)
Age: 89
End: 2024-07-08

## 2024-07-08 VITALS
DIASTOLIC BLOOD PRESSURE: 57 MMHG | HEART RATE: 73 BPM | WEIGHT: 93.5 LBS | BODY MASS INDEX: 18.36 KG/M2 | OXYGEN SATURATION: 97 % | RESPIRATION RATE: 16 BRPM | HEIGHT: 60 IN | SYSTOLIC BLOOD PRESSURE: 93 MMHG | TEMPERATURE: 97 F

## 2024-07-08 DIAGNOSIS — Z86.79 PERSONAL HISTORY OF OTHER DISEASES OF THE CIRCULATORY SYSTEM: ICD-10-CM

## 2024-07-08 DIAGNOSIS — K90.9 INTESTINAL MALABSORPTION, UNSPECIFIED: ICD-10-CM

## 2024-07-08 DIAGNOSIS — C18.9 MALIGNANT NEOPLASM OF COLON, UNSPECIFIED: ICD-10-CM

## 2024-07-08 DIAGNOSIS — I10 ESSENTIAL (PRIMARY) HYPERTENSION: ICD-10-CM

## 2024-07-08 PROCEDURE — G2211 COMPLEX E/M VISIT ADD ON: CPT

## 2024-07-08 PROCEDURE — 99205 OFFICE O/P NEW HI 60 MIN: CPT

## 2024-07-08 RX ORDER — ACETAMINOPHEN 325 MG/1
325 TABLET, FILM COATED ORAL
Refills: 0 | Status: ACTIVE | COMMUNITY
Start: 2024-07-08

## 2024-07-08 RX ORDER — LEVOFLOXACIN 500 MG/1
500 TABLET, FILM COATED ORAL
Qty: 10 | Refills: 0 | Status: DISCONTINUED | COMMUNITY
Start: 2024-03-02

## 2024-07-08 RX ORDER — SIMETHICONE 125 MG
125 CAPSULE ORAL
Refills: 0 | Status: ACTIVE | COMMUNITY
Start: 2024-07-08

## 2024-07-08 RX ORDER — FLUTICASONE PROPIONATE 50 UG/1
50 SPRAY, METERED NASAL TWICE DAILY
Refills: 0 | Status: ACTIVE | COMMUNITY
Start: 2024-05-17

## 2024-07-08 RX ORDER — POTASSIUM CHLORIDE 750 MG/1
10 TABLET, EXTENDED RELEASE ORAL
Qty: 90 | Refills: 0 | Status: DISCONTINUED | COMMUNITY
Start: 2024-03-11

## 2024-07-08 RX ORDER — FUROSEMIDE 40 MG/1
40 TABLET ORAL
Qty: 90 | Refills: 0 | Status: DISCONTINUED | COMMUNITY
Start: 2024-03-11

## 2024-07-08 RX ORDER — LEVOTHYROXINE SODIUM 0.05 MG/1
50 TABLET ORAL DAILY
Refills: 0 | Status: ACTIVE | COMMUNITY
Start: 2024-04-04

## 2024-07-08 RX ORDER — ESCITALOPRAM OXALATE 10 MG/1
10 TABLET ORAL DAILY
Refills: 0 | Status: ACTIVE | COMMUNITY
Start: 2024-06-19

## 2024-07-08 RX ORDER — CHOLESTYRAMINE 4 G/9G
4 POWDER, FOR SUSPENSION ORAL TWICE DAILY
Refills: 0 | Status: ACTIVE | COMMUNITY
Start: 2024-07-08

## 2024-07-08 RX ORDER — LOPERAMIDE HYDROCHLORIDE 2 MG/1
2 CAPSULE ORAL
Refills: 0 | Status: ACTIVE | COMMUNITY
Start: 2024-06-10

## 2024-07-09 RX ORDER — DIPHENOXYLATE HYDROCHLORIDE AND ATROPINE SULFATE 2.5; .025 MG/1; MG/1
2.5-0.025 TABLET ORAL
Qty: 60 | Refills: 0 | Status: ACTIVE | COMMUNITY
Start: 2024-07-08 | End: 1900-01-01

## 2024-07-11 ENCOUNTER — APPOINTMENT (OUTPATIENT)
Dept: COLORECTAL SURGERY | Facility: CLINIC | Age: 89
End: 2024-07-11
Payer: MEDICARE

## 2024-07-11 DIAGNOSIS — D49.0 NEOPLASM OF UNSPECIFIED BEHAVIOR OF DIGESTIVE SYSTEM: ICD-10-CM

## 2024-07-11 PROCEDURE — 99024 POSTOP FOLLOW-UP VISIT: CPT

## 2024-07-12 PROBLEM — I10 HYPERTENSION, UNSPECIFIED TYPE: Status: ACTIVE | Noted: 2024-04-23

## 2024-07-12 PROBLEM — K90.9 MALABSORPTION: Status: ACTIVE | Noted: 2024-07-08

## 2024-07-23 ENCOUNTER — NON-APPOINTMENT (OUTPATIENT)
Age: 89
End: 2024-07-23

## 2024-09-12 ENCOUNTER — APPOINTMENT (OUTPATIENT)
Dept: COLORECTAL SURGERY | Facility: CLINIC | Age: 89
End: 2024-09-12
Payer: MEDICARE

## 2024-09-12 DIAGNOSIS — C18.9 MALIGNANT NEOPLASM OF COLON, UNSPECIFIED: ICD-10-CM

## 2024-09-12 PROCEDURE — 99213 OFFICE O/P EST LOW 20 MIN: CPT

## 2024-09-12 NOTE — HISTORY OF PRESENT ILLNESS
[FreeTextEntry1] : Status post abdominal colectomy with end ileostomy For stage III colon cancer with high risk features. Patient is currently progressing well. She is gaining weight tolerating diet and overall improved health. No fevers or chills no nausea or vomiting. Her family is helping her with her ileostomy. No aggravating factors

## 2024-09-12 NOTE — ASSESSMENT
[FreeTextEntry1] : Stage III colon cancer with high risk features -Patient with multiple medical problems but is progressing well. She is tolerating diet gaining weight and her stoma is functioning without event -I recommended continued Continued surveillance going forward -I recommended patient follow up with medical oncology To discuss any further treatment -Follow up in 6 months for reevaluation -All questions answered

## 2024-09-12 NOTE — PHYSICAL EXAM
[FreeTextEntry1] : Alert and oriented  Moves all extremities but is wheelchair-bound Abdomen soft incision healed stoma functioning No rashes

## 2025-02-19 ENCOUNTER — NON-APPOINTMENT (OUTPATIENT)
Age: 89
End: 2025-02-19

## 2025-02-20 ENCOUNTER — INPATIENT (INPATIENT)
Facility: HOSPITAL | Age: 89
LOS: 7 days | Discharge: HOME | DRG: 195 | End: 2025-02-28
Attending: STUDENT IN AN ORGANIZED HEALTH CARE EDUCATION/TRAINING PROGRAM | Admitting: STUDENT IN AN ORGANIZED HEALTH CARE EDUCATION/TRAINING PROGRAM
Payer: MEDICARE

## 2025-02-20 VITALS
SYSTOLIC BLOOD PRESSURE: 122 MMHG | RESPIRATION RATE: 18 BRPM | DIASTOLIC BLOOD PRESSURE: 69 MMHG | OXYGEN SATURATION: 92 % | TEMPERATURE: 98 F | HEART RATE: 78 BPM

## 2025-02-20 DIAGNOSIS — J18.9 PNEUMONIA, UNSPECIFIED ORGANISM: ICD-10-CM

## 2025-02-20 DIAGNOSIS — Z98.890 OTHER SPECIFIED POSTPROCEDURAL STATES: Chronic | ICD-10-CM

## 2025-02-20 LAB
ALBUMIN SERPL ELPH-MCNC: 2.8 G/DL — LOW (ref 3.3–5)
ALP SERPL-CCNC: 86 U/L — SIGNIFICANT CHANGE UP (ref 40–120)
ALT FLD-CCNC: 13 U/L — SIGNIFICANT CHANGE UP (ref 10–45)
ANION GAP SERPL CALC-SCNC: 9 MMOL/L — SIGNIFICANT CHANGE UP (ref 5–17)
AST SERPL-CCNC: 26 U/L — SIGNIFICANT CHANGE UP (ref 10–40)
BASOPHILS # BLD AUTO: 0.02 K/UL — SIGNIFICANT CHANGE UP (ref 0–0.2)
BASOPHILS NFR BLD AUTO: 0.2 % — SIGNIFICANT CHANGE UP (ref 0–2)
BILIRUB SERPL-MCNC: 0.8 MG/DL — SIGNIFICANT CHANGE UP (ref 0.2–1.2)
BUN SERPL-MCNC: 13 MG/DL — SIGNIFICANT CHANGE UP (ref 7–23)
CALCIUM SERPL-MCNC: 9 MG/DL — SIGNIFICANT CHANGE UP (ref 8.4–10.5)
CHLORIDE SERPL-SCNC: 106 MMOL/L — SIGNIFICANT CHANGE UP (ref 96–108)
CO2 SERPL-SCNC: 29 MMOL/L — SIGNIFICANT CHANGE UP (ref 22–31)
CREAT SERPL-MCNC: 0.63 MG/DL — SIGNIFICANT CHANGE UP (ref 0.5–1.3)
EGFR: 86 ML/MIN/1.73M2 — SIGNIFICANT CHANGE UP
EOSINOPHIL # BLD AUTO: 0.12 K/UL — SIGNIFICANT CHANGE UP (ref 0–0.5)
EOSINOPHIL NFR BLD AUTO: 1.1 % — SIGNIFICANT CHANGE UP (ref 0–6)
GLUCOSE SERPL-MCNC: 93 MG/DL — SIGNIFICANT CHANGE UP (ref 70–99)
HCT VFR BLD CALC: 34.8 % — SIGNIFICANT CHANGE UP (ref 34.5–45)
HGB BLD-MCNC: 10.9 G/DL — LOW (ref 11.5–15.5)
IMM GRANULOCYTES NFR BLD AUTO: 1.2 % — HIGH (ref 0–0.9)
LYMPHOCYTES # BLD AUTO: 1.55 K/UL — SIGNIFICANT CHANGE UP (ref 1–3.3)
LYMPHOCYTES # BLD AUTO: 13.9 % — SIGNIFICANT CHANGE UP (ref 13–44)
MAGNESIUM SERPL-MCNC: 1.6 MG/DL — SIGNIFICANT CHANGE UP (ref 1.6–2.6)
MCHC RBC-ENTMCNC: 29.2 PG — SIGNIFICANT CHANGE UP (ref 27–34)
MCHC RBC-ENTMCNC: 31.3 G/DL — LOW (ref 32–36)
MCV RBC AUTO: 93.3 FL — SIGNIFICANT CHANGE UP (ref 80–100)
MONOCYTES # BLD AUTO: 0.88 K/UL — SIGNIFICANT CHANGE UP (ref 0–0.9)
MONOCYTES NFR BLD AUTO: 7.9 % — SIGNIFICANT CHANGE UP (ref 2–14)
NEUTROPHILS # BLD AUTO: 8.43 K/UL — HIGH (ref 1.8–7.4)
NEUTROPHILS NFR BLD AUTO: 75.7 % — SIGNIFICANT CHANGE UP (ref 43–77)
NRBC BLD AUTO-RTO: 0 /100 WBCS — SIGNIFICANT CHANGE UP (ref 0–0)
PHOSPHATE SERPL-MCNC: 2.4 MG/DL — LOW (ref 2.5–4.5)
PLATELET # BLD AUTO: 304 K/UL — SIGNIFICANT CHANGE UP (ref 150–400)
POTASSIUM SERPL-MCNC: 3.9 MMOL/L — SIGNIFICANT CHANGE UP (ref 3.5–5.3)
POTASSIUM SERPL-SCNC: 3.9 MMOL/L — SIGNIFICANT CHANGE UP (ref 3.5–5.3)
PROT SERPL-MCNC: 6 G/DL — SIGNIFICANT CHANGE UP (ref 6–8.3)
RBC # BLD: 3.73 M/UL — LOW (ref 3.8–5.2)
RBC # FLD: 14.8 % — HIGH (ref 10.3–14.5)
SODIUM SERPL-SCNC: 144 MMOL/L — SIGNIFICANT CHANGE UP (ref 135–145)
WBC # BLD: 11.13 K/UL — HIGH (ref 3.8–10.5)
WBC # FLD AUTO: 11.13 K/UL — HIGH (ref 3.8–10.5)

## 2025-02-20 NOTE — PATIENT PROFILE ADULT - CENTRAL VENOUS CATHETER/PICC LINE
----- Message from NELA Andersen sent at 8/16/2022  8:35 AM CDT -----  Pleas call, Vit D is low,needs 2,000 units of Vit D supplements daily  
Reviewed  Seen yesterday in clinic  RN phoned patient.  Reviewed below  Patient in agreement  Encounter closed  
no

## 2025-02-20 NOTE — PATIENT PROFILE ADULT - FUNCTIONAL ASSESSMENT - BASIC MOBILITY 6.
2-calculated by average/Not able to assess (calculate score using [facility] averaging method)
yes

## 2025-02-20 NOTE — PATIENT PROFILE ADULT - MST SCORE
Physical Therapy      Opal Diaz  MRN: [id number]    Patient not seen today secondary to  (pt with labile respiratory status.  Only appropriate for ROM.  OT to follow.). Will follow-up as appropriate.    Jimena Quesada, PT     1

## 2025-02-20 NOTE — PATIENT PROFILE ADULT - NSPRONUTRITIONRISK_GEN_A_NUR
no fever/no weakness/no dizziness/no nausea/no tingling/no vomiting Pressure injury stage 2 or greater

## 2025-02-21 DIAGNOSIS — E03.9 HYPOTHYROIDISM, UNSPECIFIED: ICD-10-CM

## 2025-02-21 DIAGNOSIS — K56.7 ILEUS, UNSPECIFIED: ICD-10-CM

## 2025-02-21 DIAGNOSIS — L89.90 PRESSURE ULCER OF UNSPECIFIED SITE, UNSPECIFIED STAGE: ICD-10-CM

## 2025-02-21 DIAGNOSIS — N39.0 URINARY TRACT INFECTION, SITE NOT SPECIFIED: ICD-10-CM

## 2025-02-21 DIAGNOSIS — J18.9 PNEUMONIA, UNSPECIFIED ORGANISM: ICD-10-CM

## 2025-02-21 DIAGNOSIS — C18.9 MALIGNANT NEOPLASM OF COLON, UNSPECIFIED: ICD-10-CM

## 2025-02-21 DIAGNOSIS — R53.2 FUNCTIONAL QUADRIPLEGIA: ICD-10-CM

## 2025-02-21 DIAGNOSIS — Z51.5 ENCOUNTER FOR PALLIATIVE CARE: ICD-10-CM

## 2025-02-21 DIAGNOSIS — Z29.9 ENCOUNTER FOR PROPHYLACTIC MEASURES, UNSPECIFIED: ICD-10-CM

## 2025-02-21 DIAGNOSIS — R11.2 NAUSEA WITH VOMITING, UNSPECIFIED: ICD-10-CM

## 2025-02-21 DIAGNOSIS — Z79.899 OTHER LONG TERM (CURRENT) DRUG THERAPY: ICD-10-CM

## 2025-02-21 DIAGNOSIS — I10 ESSENTIAL (PRIMARY) HYPERTENSION: ICD-10-CM

## 2025-02-21 DIAGNOSIS — Z71.89 OTHER SPECIFIED COUNSELING: ICD-10-CM

## 2025-02-21 LAB
ALBUMIN SERPL ELPH-MCNC: 2.7 G/DL — LOW (ref 3.3–5)
ALP SERPL-CCNC: 84 U/L — SIGNIFICANT CHANGE UP (ref 40–120)
ALT FLD-CCNC: 15 U/L — SIGNIFICANT CHANGE UP (ref 10–45)
ANION GAP SERPL CALC-SCNC: 13 MMOL/L — SIGNIFICANT CHANGE UP (ref 5–17)
APPEARANCE UR: ABNORMAL
AST SERPL-CCNC: 48 U/L — HIGH (ref 10–40)
BACTERIA # UR AUTO: NEGATIVE /HPF — SIGNIFICANT CHANGE UP
BILIRUB SERPL-MCNC: 0.7 MG/DL — SIGNIFICANT CHANGE UP (ref 0.2–1.2)
BILIRUB UR-MCNC: NEGATIVE — SIGNIFICANT CHANGE UP
BUN SERPL-MCNC: 12 MG/DL — SIGNIFICANT CHANGE UP (ref 7–23)
CALCIUM SERPL-MCNC: 9.1 MG/DL — SIGNIFICANT CHANGE UP (ref 8.4–10.5)
CAST: 1 /LPF — SIGNIFICANT CHANGE UP (ref 0–4)
CHLORIDE SERPL-SCNC: 105 MMOL/L — SIGNIFICANT CHANGE UP (ref 96–108)
CO2 SERPL-SCNC: 24 MMOL/L — SIGNIFICANT CHANGE UP (ref 22–31)
COLOR SPEC: YELLOW — SIGNIFICANT CHANGE UP
CREAT SERPL-MCNC: 0.64 MG/DL — SIGNIFICANT CHANGE UP (ref 0.5–1.3)
DIFF PNL FLD: ABNORMAL
EGFR: 85 ML/MIN/1.73M2 — SIGNIFICANT CHANGE UP
GAS PNL BLDV: SIGNIFICANT CHANGE UP
GLUCOSE SERPL-MCNC: 115 MG/DL — HIGH (ref 70–99)
GLUCOSE UR QL: 100 MG/DL
HCT VFR BLD CALC: 37.6 % — SIGNIFICANT CHANGE UP (ref 34.5–45)
HGB BLD-MCNC: 12 G/DL — SIGNIFICANT CHANGE UP (ref 11.5–15.5)
KETONES UR-MCNC: NEGATIVE MG/DL — SIGNIFICANT CHANGE UP
LACTATE SERPL-SCNC: 2.1 MMOL/L — HIGH (ref 0.5–2)
LEUKOCYTE ESTERASE UR-ACNC: NEGATIVE — SIGNIFICANT CHANGE UP
MAGNESIUM SERPL-MCNC: 1.6 MG/DL — SIGNIFICANT CHANGE UP (ref 1.6–2.6)
MCHC RBC-ENTMCNC: 30.4 PG — SIGNIFICANT CHANGE UP (ref 27–34)
MCHC RBC-ENTMCNC: 31.9 G/DL — LOW (ref 32–36)
MCV RBC AUTO: 95.2 FL — SIGNIFICANT CHANGE UP (ref 80–100)
NITRITE UR-MCNC: NEGATIVE — SIGNIFICANT CHANGE UP
NRBC BLD AUTO-RTO: 0 /100 WBCS — SIGNIFICANT CHANGE UP (ref 0–0)
PH UR: 6 — SIGNIFICANT CHANGE UP (ref 5–8)
PHOSPHATE SERPL-MCNC: 1.9 MG/DL — LOW (ref 2.5–4.5)
PLATELET # BLD AUTO: 318 K/UL — SIGNIFICANT CHANGE UP (ref 150–400)
POTASSIUM SERPL-MCNC: 3.7 MMOL/L — SIGNIFICANT CHANGE UP (ref 3.5–5.3)
POTASSIUM SERPL-SCNC: 3.7 MMOL/L — SIGNIFICANT CHANGE UP (ref 3.5–5.3)
PROT SERPL-MCNC: 6 G/DL — SIGNIFICANT CHANGE UP (ref 6–8.3)
PROT UR-MCNC: 30 MG/DL
RBC # BLD: 3.95 M/UL — SIGNIFICANT CHANGE UP (ref 3.8–5.2)
RBC # FLD: 14.6 % — HIGH (ref 10.3–14.5)
RBC CASTS # UR COMP ASSIST: 2 /HPF — SIGNIFICANT CHANGE UP (ref 0–4)
REVIEW: SIGNIFICANT CHANGE UP
SODIUM SERPL-SCNC: 142 MMOL/L — SIGNIFICANT CHANGE UP (ref 135–145)
SP GR SPEC: 1.02 — SIGNIFICANT CHANGE UP (ref 1–1.03)
SQUAMOUS # UR AUTO: 3 /HPF — SIGNIFICANT CHANGE UP (ref 0–5)
UROBILINOGEN FLD QL: 0.2 MG/DL — SIGNIFICANT CHANGE UP (ref 0.2–1)
WBC # BLD: 10.9 K/UL — HIGH (ref 3.8–10.5)
WBC # FLD AUTO: 10.9 K/UL — HIGH (ref 3.8–10.5)
WBC UR QL: 2 /HPF — SIGNIFICANT CHANGE UP (ref 0–5)

## 2025-02-21 PROCEDURE — 99497 ADVNCD CARE PLAN 30 MIN: CPT | Mod: 25

## 2025-02-21 PROCEDURE — 99223 1ST HOSP IP/OBS HIGH 75: CPT | Mod: GC

## 2025-02-21 PROCEDURE — 99223 1ST HOSP IP/OBS HIGH 75: CPT

## 2025-02-21 PROCEDURE — 71045 X-RAY EXAM CHEST 1 VIEW: CPT | Mod: 26

## 2025-02-21 PROCEDURE — 74177 CT ABD & PELVIS W/CONTRAST: CPT | Mod: 26

## 2025-02-21 PROCEDURE — 99233 SBSQ HOSP IP/OBS HIGH 50: CPT | Mod: GC

## 2025-02-21 RX ORDER — ENOXAPARIN SODIUM 100 MG/ML
40 INJECTION SUBCUTANEOUS EVERY 24 HOURS
Refills: 0 | Status: DISCONTINUED | OUTPATIENT
Start: 2025-02-21 | End: 2025-02-28

## 2025-02-21 RX ORDER — SODIUM CHLORIDE 9 G/1000ML
1000 INJECTION, SOLUTION INTRAVENOUS
Refills: 0 | Status: DISCONTINUED | OUTPATIENT
Start: 2025-02-21 | End: 2025-02-23

## 2025-02-21 RX ORDER — SODIUM CHLORIDE 9 G/1000ML
1000 INJECTION, SOLUTION INTRAVENOUS
Refills: 0 | Status: DISCONTINUED | OUTPATIENT
Start: 2025-02-21 | End: 2025-02-21

## 2025-02-21 RX ORDER — AZITHROMYCIN 250 MG
500 CAPSULE ORAL ONCE
Refills: 0 | Status: DISCONTINUED | OUTPATIENT
Start: 2025-02-21 | End: 2025-02-21

## 2025-02-21 RX ORDER — POTASSIUM PHOSPHATE, MONOBASIC POTASSIUM PHOSPHATE, DIBASIC INJECTION, 236; 224 MG/ML; MG/ML
15 SOLUTION, CONCENTRATE INTRAVENOUS ONCE
Refills: 0 | Status: COMPLETED | OUTPATIENT
Start: 2025-02-21 | End: 2025-02-21

## 2025-02-21 RX ORDER — CEFTRIAXONE 500 MG/1
1000 INJECTION, POWDER, FOR SOLUTION INTRAMUSCULAR; INTRAVENOUS EVERY 24 HOURS
Refills: 0 | Status: COMPLETED | OUTPATIENT
Start: 2025-02-21 | End: 2025-02-25

## 2025-02-21 RX ORDER — ESCITALOPRAM OXALATE 20 MG/1
10 TABLET ORAL DAILY
Refills: 0 | Status: DISCONTINUED | OUTPATIENT
Start: 2025-02-21 | End: 2025-02-28

## 2025-02-21 RX ORDER — LEVOTHYROXINE SODIUM 300 MCG
50 TABLET ORAL DAILY
Refills: 0 | Status: DISCONTINUED | OUTPATIENT
Start: 2025-02-21 | End: 2025-02-22

## 2025-02-21 RX ORDER — SOD PHOS DI, MONO/K PHOS MONO 250 MG
1 TABLET ORAL ONCE
Refills: 0 | Status: COMPLETED | OUTPATIENT
Start: 2025-02-21 | End: 2025-02-21

## 2025-02-21 RX ORDER — AZITHROMYCIN 250 MG
CAPSULE ORAL
Refills: 0 | Status: DISCONTINUED | OUTPATIENT
Start: 2025-02-21 | End: 2025-02-21

## 2025-02-21 RX ORDER — AZITHROMYCIN 250 MG
500 CAPSULE ORAL EVERY 24 HOURS
Refills: 0 | Status: DISCONTINUED | OUTPATIENT
Start: 2025-02-21 | End: 2025-02-24

## 2025-02-21 RX ADMIN — CEFTRIAXONE 100 MILLIGRAM(S): 500 INJECTION, POWDER, FOR SOLUTION INTRAMUSCULAR; INTRAVENOUS at 03:35

## 2025-02-21 RX ADMIN — Medication 50 MICROGRAM(S): at 05:03

## 2025-02-21 RX ADMIN — ENOXAPARIN SODIUM 40 MILLIGRAM(S): 100 INJECTION SUBCUTANEOUS at 05:03

## 2025-02-21 RX ADMIN — Medication 1 PACKET(S): at 09:46

## 2025-02-21 RX ADMIN — Medication 1 PACKET(S): at 05:03

## 2025-02-21 RX ADMIN — ESCITALOPRAM OXALATE 10 MILLIGRAM(S): 20 TABLET ORAL at 13:39

## 2025-02-21 RX ADMIN — Medication 250 MILLIGRAM(S): at 04:10

## 2025-02-21 RX ADMIN — SODIUM CHLORIDE 75 MILLILITER(S): 9 INJECTION, SOLUTION INTRAVENOUS at 03:24

## 2025-02-21 RX ADMIN — POTASSIUM PHOSPHATE, MONOBASIC POTASSIUM PHOSPHATE, DIBASIC INJECTION, 62.5 MILLIMOLE(S): 236; 224 SOLUTION, CONCENTRATE INTRAVENOUS at 12:42

## 2025-02-21 NOTE — PROGRESS NOTE ADULT - PROBLEM SELECTOR PLAN 5
-Patient taking nebivolol at home but stopped at [facility]    Plan:  -Patient normotensive. Will continue to hold nebivolol

## 2025-02-21 NOTE — ADVANCED PRACTICE NURSE CONSULT - RECOMMEDATIONS
urinary incontinence      fecal incontinence     Sacrum/coccyx Stage I      bilateral heels blanchable erythema, skin intact.                   Recommendations             1. Bilateral sacrum/buttock              Stage I     Topical therapy- sacral/bilateral buttocks- Apply Cavilon No-sting barrier wipe to form a gentle, breathable, waterproof, transparent coating on the skin. Cover with Allevyn Foam bordered dressing. Frequency: Daily & PRN Soiling        2. Right and left heel              Elevate heels; apply Complete Cair air fluidized boots; ensure that the soles of the feet are not resting on the foot board of the bed.            3 Incontinent management - incontinent cleanser, pads, kirk care BID            4. Maintain on an alternating air with low air loss surface             5. Turn & reposition every 2 hr; Use positioning pillow to turn and reposition, soft pillow between bony prominences; continue measures to decrease friction/shear/pressure.            6. Nutrition optimization.            7. Place waffle cushion when out of bed to chair.

## 2025-02-21 NOTE — PROGRESS NOTE ADULT - PROBLEM SELECTOR PLAN 7
-Medications from recent stay at [facility] added    Plan:  -f/u with daughter re: home meds today

## 2025-02-21 NOTE — CONSULT NOTE ADULT - ASSESSMENT
88 y/o woman presenting to OSH found to have PNA and UTI now transferred to [facility] for continuation of care and oncology care continuity.     Primary Oncologist: Dr. Rama Jane  Meds: Enoxaparin, Azithro, Ceftriaxone, Escitalopram, Levothyroxine.   PMHx: HTN, HLD, Hypothyroid, SDH s/p craniotomy, SBO.   Brief Onc History:   -Hospitalized at [facility] from 3/20- 3/28/24 for SBO. 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   -Colonoscopy on 3/26/24: Found likely malignant tumor in the sigmoid colon, diverticulosis in the descending and sigmoid colon, and likely malignant partially obstructing tumor at the ileocecal valve, biopsied  -5/20/24, she underwent lap-assisted total abdominal colectomy with end ileostomy for partially obstructing cecal mass and peritoneal colon mass by Dr. Chung  -Path from resection as follows: Peritoneal implant- benign. 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  Path stage [id number]. 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 MMR: MLH1, MSH2, MSH6 and PMS2 all intact. Final path R sided Stage III colorectal cancer (cecal, ileocecal valve).    -Last seen by Dr. Jane in July 2024, conversation at the time involved post-operative report/path showing Stage III disease, high risk, conversation about adjuvant systemic treatment at the time was had, through shared decision making given patient's frail performance status and desire not to get systemic treatment, patient did not receive adjuvant treatment w/ plan to possibly image/f/u w/ Dr. Jane for imaging q3 months. No f/u imaging seen in the EMR since that appointment however.       Recommendations:   -Can check CEA, CA 19-9, and  levels  -Will need to review imaging from [facility]  -Infectious management per primary team    Oncology to follow with you.     Plan to be d/w Dr. Kim.     Germán Damon M.D.  H/O PGY-5      88 y/o woman presenting to OSH found to have PNA and UTI now transferred to [facility] for continuation of care and oncology care continuity.     Primary Oncologist: Dr. Rama Jane  Meds: Enoxaparin, Azithro, Ceftriaxone, Escitalopram, Levothyroxine.   PMHx: HTN, HLD, Hypothyroid, SDH s/p craniotomy, SBO.   Brief Onc History:   -Hospitalized at [facility] from 3/20- 3/28/24 for SBO. 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   -Colonoscopy on 3/26/24: Found likely malignant tumor in the sigmoid colon, diverticulosis in the descending and sigmoid colon, and likely malignant partially obstructing tumor at the ileocecal valve, biopsied  -5/20/24, she underwent lap-assisted total abdominal colectomy with end ileostomy for partially obstructing cecal mass and peritoneal colon mass by Dr. Chung  -Path from resection as follows: Peritoneal implant- benign. 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  Path stage [id number]. 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 MMR: MLH1, MSH2, MSH6 and PMS2 all intact. Final path R sided Stage III colorectal cancer (cecal, ileocecal valve).    -Last seen by Dr. Jane in July 2024, conversation at the time involved post-operative report/path showing Stage III disease, high risk, conversation about adjuvant systemic treatment at the time was had, through shared decision making given patient's frail performance status and desire not to get systemic treatment, patient did not receive adjuvant treatment w/ plan to possibly image/f/u w/ Dr. Jane for imaging q3 months. No f/u imaging seen in the EMR since that appointment however.    Recommendations:   -Can check CEA, CA 19-9, and  levels  -Will need to review imaging from [facility] to assess for esophageal mass. If patient w/ esophageal mass as cause of nausea would recommend GI evaluation. Otherwise if no distinct mass consider speech and swallow evaluation.   -Infectious management per primary team  -Continued GOC discussion with the family pending     Oncology to follow with you.     Plan to be d/w Dr. Kim.     Germán Damon M.D.  H/O PGY-5

## 2025-02-21 NOTE — ADVANCED PRACTICE NURSE CONSULT - REASON FOR CONSULT
Wound consult requested to assess skin status; Sacrum - Suspected Stage I pressure injury, present on admission.  HPI: 89 y/o F w/ PMH HTN. hypothyroidism, osteoporosis, severe MR and TR, kidney stones, SDH s/p craniectomy, colon cancer s/p abdominal colectomy w/ end ileostomy, SBO presenting to [facility] with cough and post-tussive emesis. She was found to have a UTI and community acquired PNA and started on ceftriaxone. CT imaging at [facility] showing progression of metastases to liver and lung. Patient and family requesting transfer to [facility] for further management given that Dr. Rama Jane is her oncologist.

## 2025-02-21 NOTE — CONSULT NOTE ADULT - PROBLEM SELECTOR RECOMMENDATION 6
HCP: daughter Alex Griggs    The patient's daughter stated that the patient would like further investigation of the new metastatic disease diagnosis to understand her treatment options better, given that imaging at [facility] was inconclusive.    I inquired about the patient's wishes regarding cardiopulmonary resuscitation and recommended DNR/DNI, explaining that CPR and intubation often have poor outcomes in patients with serious illnesses and can impose further burdens at the end of life. The patient stated that she needs time to consider this. Indu was confirmed as the patient's health care proxy.

## 2025-02-21 NOTE — CONSULT NOTE ADULT - ASSESSMENT
89 year old patient with right heel DTI  - Patient seen and evaluated  - Heel DTI noted with no open lesions, no signs of infection in feet  - Recommend z flow boots in bed at all times  - Recommend painting area with betadine and covering with allevyn foam, change every other day  - Podiatry signing off at this time, reconsult as needed

## 2025-02-21 NOTE — PROGRESS NOTE ADULT - PROBLEM SELECTOR PLAN 4
-Patient diagnosed with UTI at [facility]    Plan:  -Will obtain UA, Ucx  -C/w ceftriaxone

## 2025-02-21 NOTE — CONSULT NOTE ADULT - SUBJECTIVE AND OBJECTIVE BOX
Date of Service: 02-21-25 @ 13:19    HPI:  Patient is poor historian and collateral from family was unable to be obtained overnight. What follows is the history signed out from [facility]. 89 y/o F w/ PMH HTN. 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 She was found to have a UTI and community acquired PNA and started on ceftriaxone. CT imaging at [facility] showing progression of metastases to liver and lung. Patient and family requesting transfer to [facility] for further management given that Dr. Rama Jane is her oncologist. Dr. Jane accepted the transfer and patient admitted to medicine service for further management. Upon arrival, patient was vitally stable and afebrile. Labs significant for WBC 11, Hgb 10.9.    (21 Feb 2025 01:09)    PERTINENT PM/SXH:   HTN (hypertension)    Hypothyroidism    Osteoporosis    Major depression    History of subdural hematoma    Neoplasm of digestive system      S/P cystoscopy    H/O craniotomy    Status post craniectomy    Status post craniotomy      FAMILY HISTORY:  Family history of colon cancer in father (Father)        SOCIAL HISTORY:   Significant other/partner[ ]  Children[x ]  Jehovah's witness/Spirituality:  Substance hx:  [ ]   Tobacco hx:  [ ]   Alcohol hx: [ ]   Home Opioid hx:  [ ] I-Stop Reference No:  Living Situation: [ ]Home  [ ]Long term care  [ ]Rehab [ ]Other    ADVANCE DIRECTIVES:    DNR/MOLST  [ ]  Living Will  [ ]   DECISION MAKER(s):  [x ] Health Care Proxy(s)  [ ] Surrogate(s)  [ ] Guardian           Name(s): Phone Number(s): Daughter Indu Kam [phone]    BASELINE (I)ADL(s) (prior to admission):  [place]: [ ]Total  [ ] Moderate [x]Dependent    Allergies    nebivolol (Other)    Intolerances    MEDICATIONS  (STANDING):  azithromycin  IVPB 500 milliGRAM(s) IV Intermittent every 24 hours  cefTRIAXone   IVPB 1000 milliGRAM(s) IV Intermittent every 24 hours  dextrose 5% + lactated ringers. 1000 milliLiter(s) (75 mL/Hr) IV Continuous <Continuous>  enoxaparin Injectable 40 milliGRAM(s) SubCutaneous every 24 hours  escitalopram 10 milliGRAM(s) Oral daily  levothyroxine 50 MICROGram(s) Oral daily  potassium phosphate IVPB 15 milliMole(s) IV Intermittent once    MEDICATIONS  (PRN):      ITEMS NOT CHECKED ARE NOT PRESENT  PRESENT SYMPTOMS: [ ]Unable to self-report see CPOT, PAINADs, RDOS  Source if other than patient:  [ ]Family   [ ]Team     Pain: [ ]yes [x]no  QOL impact -   Location -                    Aggravating factors -  Quality -  Radiation -  Timing-  Severity (0-10 scale):  Minimal acceptable level (0-10 scale):       Dyspnea:                           [ ]Mild [ ]Moderate [ ]Severe  Anxiety:                             [ ]Mild [ ]Moderate [ ]Severe  Fatigue:                             [ ]Mild [ ]Moderate [ ]Severe  Nausea:                             [ ]Mild [ ]Moderate [ ]Severe  Loss of appetite:              [ ]Mild [ ]Moderate [ ]Severe  Constipation:                    [ ]Mild [ ]Moderate [ ]Severe    PCSSQ [Palliative Care Spiritual Screening Question]   Severity (0-10):  Score of 4 or > indicate consideration of Chaplaincy referral.  Chaplaincy Referral: [ ] yes [ ] refused [ ] following [ ] deferred    Caregiver [place]? : [ ] yes [ ] no [ ] deferred:  Social work referral [ ] Patient & Family Centered Care Referral [ ]     Anticipatory Grief Present?: [ ] yes [ ] no  [ ] deferred: Palliative Social work referral [ ]  Patient & Family Centered Care Referral [ ]       Other Symptoms:  [ ]All other review of systems negative   [ ] Unable to obtain due to poor mentation    PHYSICAL EXAM:  Vital Signs Last 24 Hrs  T(C): 36.3 (21 Feb 2025 11:45), Max: 36.4 (20 Feb 2025 20:00)  T(F): 97.3 (21 Feb 2025 11:45), Max: 97.6 (21 Feb 2025 04:17)  HR: 95 (21 Feb 2025 11:45) (78 - 95)  BP: 105/56 (21 Feb 2025 11:45) (105/56 - 124/76)  BP(mean): --  RR: 18 (21 Feb 2025 11:45) (18 - 18)  SpO2: 97% (21 Feb 2025 11:45) (92% - 97%)    Parameters below as of 21 Feb 2025 11:45  Patient On (Oxygen Delivery Method): room air     I&O's Summary    20 Feb 2025 07:01  -  21 Feb 2025 07:00  --------------------------------------------------------  IN: 0 mL / OUT: 300 mL / NET: -300 mL    21 Feb 2025 07:01  -  21 Feb 2025 13:19  --------------------------------------------------------  IN: 0 mL / OUT: 0 mL / NET: 0 mL        GENERAL:  [x]Alert  [x]Oriented   [ ]Lethargic  [ ]Cachexia  [ ]Unarousable  [x]Verbal  [ ]Non-Verbal  Behavioral:   [ ]Anxiety  [ ]Delirium [ ]Agitation [ ]Other  HEENT:  [x]Normal   [ ]Dry mouth   [ ]ET Tube/Trach  [ ]Oral lesions  PULMONARY:   [x]Clear [ ]Tachypnea  [ ]Audible excessive secretions   [ ]Rhonchi        [ ]Right [ ]Left [ ]Bilateral  [ ]Crackles        [ ]Right [ ]Left [ ]Bilateral  [ ]Wheezing     [ ]Right [ ]Left [ ]Bilateral  [ ]Diminished BS [ ] Right [ ]Left [ ]Bilateral  CARDIOVASCULAR:    [x]Regular [ ]Irregular [ ]Tachy  [ ]Henri [ ]Murmur [ ]Other  GASTROINTESTINAL:  [x]Soft  [ ]Distended   [x]+BS  [x]Non tender [ ]Tender  [ ]PEG [ ]OGT/ NGT   Last BM:    GENITOURINARY:  [x]Normal [ ]Incontinent   [ ]Oliguria/Anuria   [ ]Rogers  MUSCULOSKELETAL:   [ ]Normal   [x]Weakness  [ ]Bed/Wheelchair bound [ ]Edema  NEUROLOGIC:   [x]No focal deficits  [ ] Cognitive impairment  [ ] Dysphagia [ ]Dysarthria [ ] Paresis [ ]Other   SKIN:   [x]Normal  [ ]Rash   [ ]Pressure ulcer(s) [ ]y [ ]n present on admission    CRITICAL CARE:  [ ] Shock Present  [ ]Septic [ ]Cardiogenic [ ]Neurologic [ ]Hypovolemic  [ ]  Vasopressors [ ]  Inotropes   [ ]Respiratory failure present [ ]Mechanical ventilation [ ]Non-invasive ventilatory support [ ]High flow    [ ]Acute  [ ]Chronic [ ]Hypoxic  [ ]Hypercarbic [ ]Other  [ ]Other organ failure     LABS:                        12.0   10.90 )-----------( 318      ( 21 Feb 2025 07:22 )             37.6   02-21    142  |  105  |  12  ----------------------------<  115[H]  3.7   |  24  |  0.64    Ca    9.1      21 Feb 2025 07:22  Phos  1.9     02-21  Mg     1.6     02-21    TPro  6.0  /  Alb  2.7[L]  /  TBili  0.7  /  DBili  x   /  AST  48[H]  /  ALT  15  /  AlkPhos  84  02-21      Urinalysis Basic - ( 21 Feb 2025 07:22 )    Color: x / Appearance: x / SG: x / pH: x  Gluc: 115 mg/dL / Ketone: x  / Bili: x / Urobili: x   Blood: x / Protein: x / Nitrite: x   Leuk Esterase: x / RBC: x / WBC x   Sq Epi: x / Non Sq Epi: x / Bacteria: x      RADIOLOGY & ADDITIONAL STUDIES:  < from: Xray Chest 1 View- PORTABLE-Urgent (Xray Chest 1 View- PORTABLE-Urgent .) (02.21.25 @ 01:41) >  Patchy opacity overlying the right lung base likely representing   atelectasis.      PROTEIN CALORIE MALNUTRITION PRESENT: [ ]mild [ ]moderate [ ]severe [ ]underweight [ ]morbid obesity  https://www.andeal.org/vault/2440/web/files/ONC/Table_Clinical%20Characteristics%20to%20Document%20Malnutrition-White%20JV%20et%20al%202012.pdf    Height (cm): 160 (05-20-24 @ 10:20)  Weight (kg): 42.41 (07-08-24 @ 15:00), 44.86 (05-20-24 @ 10:20)  BMI (kg/m2): 16.6 (07-08-24 @ 15:00), 17.5 (05-20-24 @ 10:20)    [ ]PPSV2 < or = to 30% [ ]significant weight loss  [ ]poor nutritional intake  [ ]anasarca[ ]Artificial Nutrition      Other REFERRALS:  [ ]Hospice  [ ]Child Life  [ ]Social Work  [x]Case management [ ]Holistic Therapy    Date of Service: 02-21-25 @ 13:19    HPI:  Patient is poor historian and collateral from family was unable to be obtained overnight. What follows is the history signed out from [facility]. 88 y/o F w/ PMH HTN. 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 She was found to have a UTI and community acquired PNA and started on ceftriaxone. CT imaging at [facility] showing progression of metastases to liver and lung. Patient and family requesting transfer to [facility] for further management given that Dr. Rama Jane is her oncologist. Dr. Jane accepted the transfer and patient admitted to medicine service for further management. Upon arrival, patient was vitally stable and afebrile. Labs significant for WBC 11, Hgb 10.9.    (21 Feb 2025 01:09)    PERTINENT PM/SXH:   HTN (hypertension)    Hypothyroidism    Osteoporosis    Major depression    History of subdural hematoma    Neoplasm of digestive system      S/P cystoscopy    H/O craniotomy    Status post craniectomy    Status post craniotomy      FAMILY HISTORY:  Family history of colon cancer in father (Father)        SOCIAL HISTORY:   Significant other/partner[ ]  Children[x ]  Spiritism/Spirituality:  Substance hx:  [ ]   Tobacco hx:  [ ]   Alcohol hx: [ ]   Home Opioid hx:  [ ] I-Stop Reference No:  Living Situation: [ ]Home  [ ]Long term care  [ ]Rehab [ ]Other    ADVANCE DIRECTIVES:    DNR/MOLST  [ ]  Living Will  [ ]   DECISION MAKER(s):  [x ] Health Care Proxy(s)  [ ] Surrogate(s)  [ ] Guardian           Name(s): Phone Number(s): Daughter Indu Kam [phone]    BASELINE (I)ADL(s) (prior to admission):  [place]: [ ]Total  [ ] Moderate [x]Dependent    Allergies    nebivolol (Other)    Intolerances    MEDICATIONS  (STANDING):  azithromycin  IVPB 500 milliGRAM(s) IV Intermittent every 24 hours  cefTRIAXone   IVPB 1000 milliGRAM(s) IV Intermittent every 24 hours  dextrose 5% + lactated ringers. 1000 milliLiter(s) (75 mL/Hr) IV Continuous <Continuous>  enoxaparin Injectable 40 milliGRAM(s) SubCutaneous every 24 hours  escitalopram 10 milliGRAM(s) Oral daily  levothyroxine 50 MICROGram(s) Oral daily  potassium phosphate IVPB 15 milliMole(s) IV Intermittent once    MEDICATIONS  (PRN):      ITEMS NOT CHECKED ARE NOT PRESENT  PRESENT SYMPTOMS: [ ]Unable to self-report see CPOT, PAINADs, RDOS  Source if other than patient:  [ ]Family   [ ]Team     Pain: [ ]yes [x]no  QOL impact -   Location -                    Aggravating factors -  Quality -  Radiation -  Timing-  Severity (0-10 scale):  Minimal acceptable level (0-10 scale):       Dyspnea:                           [ ]Mild [ ]Moderate [ ]Severe  Anxiety:                             [ ]Mild [ ]Moderate [ ]Severe  Fatigue:                             [ ]Mild [ ]Moderate [ ]Severe  Nausea:                             [ ]Mild [ ]Moderate [ ]Severe  Loss of appetite:              [ ]Mild [ ]Moderate [ ]Severe  Constipation:                    [ ]Mild [ ]Moderate [ ]Severe    PCSSQ [Palliative Care Spiritual Screening Question]   Severity (0-10):  Score of 4 or > indicate consideration of Chaplaincy referral.  Chaplaincy Referral: [ ] yes [ ] refused [ ] following [ ] deferred    Caregiver [place]? : [ ] yes [ x] no [ ] deferred:  Social work referral [ ] Patient & Family Centered Care Referral [ ]     Anticipatory Grief Present?: [ ] yes [x ] no  [ ] deferred: Palliative Social work referral [ ]  Patient & Family Centered Care Referral [ ]       Other Symptoms:  [x ]All other review of systems negative   [ ] Unable to obtain due to poor mentation    PHYSICAL EXAM:  Vital Signs Last 24 Hrs  T(C): 36.3 (21 Feb 2025 11:45), Max: 36.4 (20 Feb 2025 20:00)  T(F): 97.3 (21 Feb 2025 11:45), Max: 97.6 (21 Feb 2025 04:17)  HR: 95 (21 Feb 2025 11:45) (78 - 95)  BP: 105/56 (21 Feb 2025 11:45) (105/56 - 124/76)  BP(mean): --  RR: 18 (21 Feb 2025 11:45) (18 - 18)  SpO2: 97% (21 Feb 2025 11:45) (92% - 97%)    Parameters below as of 21 Feb 2025 11:45  Patient On (Oxygen Delivery Method): room air     I&O's Summary    20 Feb 2025 07:01  -  21 Feb 2025 07:00  --------------------------------------------------------  IN: 0 mL / OUT: 300 mL / NET: -300 mL    21 Feb 2025 07:01  -  21 Feb 2025 13:19  --------------------------------------------------------  IN: 0 mL / OUT: 0 mL / NET: 0 mL        GENERAL:  [x]Alert  [x]Oriented 3   [ ]Lethargic  [ ]Cachexia  [ ]Unarousable  [x]Verbal  [ ]Non-Verbal  Behavioral:   [ ]Anxiety  [ ]Delirium [ ]Agitation [ x]Other: calm   HEENT:  [x]Normal   [ ]Dry mouth   [ ]ET Tube/Trach  [ ]Oral lesions  PULMONARY:   []Clear [ ]Tachypnea  [ ]Audible excessive secretions   [ ]Rhonchi        [ ]Right [ ]Left [ ]Bilateral  [ ]Crackles        [ ]Right [ ]Left [ ]Bilateral  [ ]Wheezing     [ ]Right [ ]Left [ ]Bilateral  [ x]Diminished BS [ ] Right [ ]Left x[ ]Bilateral  CARDIOVASCULAR:    []Regular [ ]Irregular [ ]Tachy  [ ]Henri [ ]Murmur [ ]Other  GASTROINTESTINAL:  [x]Soft  [ ]Distended   []+BS  [x]Non tender [ ]Tender  [ ]PEG [ ]OGT/ NGT   Last BM:  Ostomy bag green liquid   GENITOURINARY:  [x]Normal [ ]Incontinent   [ ]Oliguria/Anuria   [ ]Rogers  MUSCULOSKELETAL:   [ ]Normal   [x]Weakness  [ ]Bed/Wheelchair bound [ ]Edema  NEUROLOGIC:   [x]No focal deficits  [ ] Cognitive impairment  [ ] Dysphagia [ ]Dysarthria [ ] Paresis [ ]Other   SKIN: sacrum and b/l buttock. Please see RN documentation which I have reviewed.  []Normal  [ ]Rash   [x ]Pressure ulcer(s) [x ]y [ ]n present on admission    CRITICAL CARE:  [ ] Shock Present  [ ]Septic [ ]Cardiogenic [ ]Neurologic [ ]Hypovolemic  [ ]  Vasopressors [ ]  Inotropes   [ ]Respiratory failure present [ ]Mechanical ventilation [ ]Non-invasive ventilatory support [ ]High flow    [ ]Acute  [ ]Chronic [ ]Hypoxic  [ ]Hypercarbic [ ]Other  [x ]Other organ failure     LABS:                        12.0   10.90 )-----------( 318      ( 21 Feb 2025 07:22 )             37.6   02-21    142  |  105  |  12  ----------------------------<  115[H]  3.7   |  24  |  0.64    Ca    9.1      21 Feb 2025 07:22  Phos  1.9     02-21  Mg     1.6     02-21    TPro  6.0  /  Alb  2.7[L]  /  TBili  0.7  /  DBili  x   /  AST  48[H]  /  ALT  15  /  AlkPhos  84  02-21      Urinalysis Basic - ( 21 Feb 2025 07:22 )    Color: x / Appearance: x / SG: x / pH: x  Gluc: 115 mg/dL / Ketone: x  / Bili: x / Urobili: x   Blood: x / Protein: x / Nitrite: x   Leuk Esterase: x / RBC: x / WBC x   Sq Epi: x / Non Sq Epi: x / Bacteria: x      RADIOLOGY & ADDITIONAL STUDIES:  < from: Xray Chest 1 View- PORTABLE-Urgent (Xray Chest 1 View- PORTABLE-Urgent .) (02.21.25 @ 01:41) >  Patchy opacity overlying the right lung base likely representing   atelectasis.      PROTEIN CALORIE MALNUTRITION PRESENT: [ ]mild [ ]moderate [ ]severe [ ]underweight [ ]morbid obesity  https://www.andeal.org/vault/0073/web/files/ONC/Table_Clinical%20Characteristics%20to%20Document%20Malnutrition-White%20JV%20et%20al%202012.pdf    Height (cm): 160 (05-20-24 @ 10:20)  Weight (kg): 42.41 (07-08-24 @ 15:00), 44.86 (05-20-24 @ 10:20)  BMI (kg/m2): 16.6 (07-08-24 @ 15:00), 17.5 (05-20-24 @ 10:20)    [ ]PPSV2 < or = to 30% [ ]significant weight loss  [ ]poor nutritional intake  [ ]anasarca[ ]Artificial Nutrition      Other REFERRALS:  [ ]Hospice  [ ]Child Life  [ ]Social Work  [x]Case management [ ]Holistic Therapy

## 2025-02-21 NOTE — CONSULT NOTE ADULT - PROBLEM SELECTOR RECOMMENDATION 7
Palliative care will continue to follow for ongoing GOC discussion. Discussed with primary team    Liseth Jarrett MD   Geriatrics and Palliative Care Attending   [facility]     In the event of newly developing, evolving, or worsening symptoms, please contact the Palliative Medicine team via pager (if the patient is at [facility] #[phone] or if the patient is at [facility] #[phone]) The Geriatric and Palliative Medicine service has coverage 24 hours a day/ 7 days a week to provide medical recommendations regarding symptom management needs via telephone.

## 2025-02-21 NOTE — H&P ADULT - PROBLEM SELECTOR PLAN 2
-Patient with a hx of colon cancer  -S/p abdominal colectomy w/ end ileostomy  -Scans from [place] showing progression of metastases to liver and lung    Plan:  -Will upload CT scan images into PACS  -Reach out to Dr. Jane in AM -Patient with a hx of colon cancer  -S/p abdominal colectomy w/ end ileostomy  -Scans from [place] showing progression of metastases to liver and lung    Plan:  -Will upload CT scan images into PACS  -Reach out to Dr. Jane in AM  -Palliative consulted for assistance with GOC

## 2025-02-21 NOTE — PROGRESS NOTE ADULT - PROBLEM SELECTOR PLAN 1
-Per report, patient with concern for ileus at [facility]  -Patient presented with post-tussive emesis  -CT imaging done at [place], CD in chart; no evidence of ileus  -Patient with benign abdominal exam    Plan:  -Will maintain NPO  -Will monitor  -Start D5+LR at 75 cc/hr

## 2025-02-21 NOTE — CONSULT NOTE ADULT - CONVERSATION DETAILS
After introducing myself and my role in the patient's care, the patient and family confirmed their understanding and were receptive to the palliative care consultation. We reviewed the patient's medical and social history, including the events leading to her current hospitalization. The patient's daughter stated that the patient would like further investigation of the new metastatic disease diagnosis to understand her treatment options better, given that imaging at [facility] was inconclusive.    I inquired about the patient's wishes regarding cardiopulmonary resuscitation and recommended DNR/DNI, explaining that CPR and intubation often have poor outcomes in patients with serious illnesses and can impose further burdens at the end of life. The patient stated that she needs time to consider this. Indu was confirmed as the patient's health care proxy.

## 2025-02-21 NOTE — H&P ADULT - PROBLEM SELECTOR PLAN 1
-Per report, patient with concern for ileus at [facility]  -Patient presented with post-tussive emesis  -CT imaging done at [place], CD in chart  -Patient with benign abdominal exam    Plan:  -Will maintain NPO  -Start D5+LR at 75 cc/hr  -Will provide CD to radiology so images can be uploaded to PACS  -If evidence of ileus on scans can consult surgery in AM -Per report, patient with concern for ileus at [facility]  -Patient presented with post-tussive emesis  -CT imaging done at [place], CD in chart  -Patient with benign abdominal exam    Plan:  -Will maintain NPO  -Obtain abdominal X-ray  -Start D5+LR at 75 cc/hr  -CD already provided to CT tech in ED so images can be uploaded to PACS  -If evidence of ileus on scans can consult surgery in AM

## 2025-02-21 NOTE — PROGRESS NOTE ADULT - PROBLEM SELECTOR PLAN 2
-Patient with a hx of colon cancer  -S/p abdominal colectomy w/ end ileostomy  -Scans from [place] showing progression of metastases to liver and lung    Plan:  -heme onc following  -Palliative consulted for assistance with GOC

## 2025-02-21 NOTE — CONSULT NOTE ADULT - PROBLEM SELECTOR RECOMMENDATION 3
- PPSV 40%. The patient requires nursing assistance with all ADLs,  - Supportive care. Found to have ileus at [facility]  - On IVF and NPO  - Management per primary team

## 2025-02-21 NOTE — H&P ADULT - PROBLEM SELECTOR PLAN 5
-Patient taking nebivolol at home but stopped at [facility]    Plan:  -Patient normotensive. Will continue to hold nebivolol

## 2025-02-21 NOTE — H&P ADULT - PROBLEM SELECTOR PLAN 3
-Patient getting ceftriaxone at [facility] for PNA    Plan:  -CXR ordered  -Start CTX and azithromycin  -F/u blood cultures, urine cultures, urine strep, urine legionella

## 2025-02-21 NOTE — CONSULT NOTE ADULT - PROBLEM SELECTOR RECOMMENDATION 4
HCP: daughter Alex Griggs Bilateral buttock and sacrum pressure ulcer.   - advise wound care consult

## 2025-02-21 NOTE — CONSULT NOTE ADULT - PROBLEM SELECTOR RECOMMENDATION 9
Stage III   - Follows Carlotta Jane, last seen on July 2024 Stage III   - Follows Carlotta Jane, last seen on July 2024  - Patient would like further investigation of the new metastatic disease diagnosis to understand her treatment options better, given that imaging at [facility] was inconclusive.  - Heme/onc following

## 2025-02-21 NOTE — PROGRESS NOTE ADULT - PROBLEM SELECTOR PLAN 3
-Patient getting ceftriaxone at [facility] for PNA    Plan:  -CXR ordered  -Start CTX and azithromycin  -F/u blood cultures, urine cultures, urine strep, urine legionella

## 2025-02-21 NOTE — CONSULT NOTE ADULT - SUBJECTIVE AND OBJECTIVE BOX
HEMATOLOGY ONCOLOGY CONSULT     Patient is a 89y old  Female who presents with a chief complaint of Progression of colon CA (21 Feb 2025 13:18)      HPI:  Patient is poor historian and collateral from family was unable to be obtained overnight. What follows is the history signed out from [facility].    88 y/o F w/ PMH HTN. 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 She was found to have a UTI and community acquired PNA and started on ceftriaxone. CT imaging at [facility] showing progression of metastases to liver and lung. Patient and family requesting transfer to [facility] for further management given that Dr. Rama Jane is her oncologist. Dr. Jaen accepted the transfer and patient admitted to medicine service for further management.    Upon arrival, patient was vitally stable and afebrile. Labs significant for WBC 11, Hgb 10.9.       Oncology History:     Patient follows w/ Dr. Jane at [facility] for her colorectal cancer care. Through shared decision making after her R sided colon cancer was resected it was decided that further adjuvant therapy at this time would not be of benefit for her. She now presents as transfer from [facility] w/ suspected liver, lung, and potential esophageal lesion on imaging w/ a month of dysphagia, nausea, and poor PO intake.        ROS:  + fatigue  + nausea     PAST MEDICAL & SURGICAL HISTORY:  HTN (hypertension)      Hypothyroidism      Osteoporosis      Major depression      History of subdural hematoma      Neoplasm of digestive system      S/P cystoscopy      Status post craniotomy          SOCIAL HISTORY:    FAMILY HISTORY:  Family history of colon cancer in father (Father)        MEDICATIONS  (STANDING):  azithromycin  IVPB 500 milliGRAM(s) IV Intermittent every 24 hours  cefTRIAXone   IVPB 1000 milliGRAM(s) IV Intermittent every 24 hours  dextrose 5% + lactated ringers. 1000 milliLiter(s) (75 mL/Hr) IV Continuous <Continuous>  enoxaparin Injectable 40 milliGRAM(s) SubCutaneous every 24 hours  escitalopram 10 milliGRAM(s) Oral daily  levothyroxine 50 MICROGram(s) Oral daily    MEDICATIONS  (PRN):      Allergies    nebivolol (Other)    Intolerances        Vital Signs Last 24 Hrs  T(C): 36.3 (21 Feb 2025 11:45), Max: 36.4 (20 Feb 2025 20:00)  T(F): 97.3 (21 Feb 2025 11:45), Max: 97.6 (21 Feb 2025 04:17)  HR: 95 (21 Feb 2025 11:45) (78 - 95)  BP: 105/56 (21 Feb 2025 11:45) (105/56 - 124/76)  BP(mean): --  RR: 18 (21 Feb 2025 11:45) (18 - 18)  SpO2: 97% (21 Feb 2025 11:45) (92% - 97%)    Parameters below as of 21 Feb 2025 11:45  Patient On (Oxygen Delivery Method): room air        PHYSICAL EXAM  General: thin appearing woman  HEENT: EOMI   Neck: supple  CV: normal S1/S2 with no murmur rubs or gallops  Lungs: on room air.   Abdomen: soft non-tender non-distended  Ext: no clubbing cyanosis or edema  Skin: no rashes and no petechiae        02-20-25 @ 07:01  -  02-21-25 @ 07:00  --------------------------------------------------------  IN: 0 mL / OUT: 300 mL / NET: -300 mL    02-21-25 @ 07:01  -  02-21-25 @ 16:00  --------------------------------------------------------  IN: 625 mL / OUT: 75 mL / NET: 550 mL      LABS:                          12.0   10.90 )-----------( 318      ( 21 Feb 2025 07:22 )             37.6         Mean Cell Volume : 95.2 fl  Mean Cell Hemoglobin : 30.4 pg  Mean Cell Hemoglobin Concentration : 31.9 g/dL  Auto Neutrophil # : x  Auto Lymphocyte # : x  Auto Monocyte # : x  Auto Eosinophil # : x  Auto Basophil # : x  Auto Neutrophil % : x  Auto Lymphocyte % : x  Auto Monocyte % : x  Auto Eosinophil % : x  Auto Basophil % : x      02-21    142  |  105  |  12  ----------------------------<  115[H]  3.7   |  24  |  0.64    Ca    9.1      21 Feb 2025 07:22  Phos  1.9     02-21  Mg     1.6     02-21    TPro  6.0  /  Alb  2.7[L]  /  TBili  0.7  /  DBili  x   /  AST  48[H]  /  ALT  15  /  AlkPhos  84  02-21

## 2025-02-21 NOTE — H&P ADULT - ASSESSMENT
89 y/o F w/ PMH HTN. 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 Additionally with UTI, PNA, and concern for ileus.  89 y/o F w/ PMH HTN. 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 Additionally with UTI, PNA, and concern for ileus.

## 2025-02-21 NOTE — H&P ADULT - NSHPPHYSICALEXAM_GEN_ALL_CORE
GENERAL: NAD, lying in bed comfortably  HEAD:  Atraumatic, normocephalic  EYES: EOMI, conjunctiva and sclera clear  NECK: Supple, trachea midline, no JVD  HEART: Regular rate and rhythm, no murmurs, rubs, or gallops  LUNGS: Unlabored respirations.  Clear to auscultation bilaterally, no crackles, wheezing, or rhonchi  ABDOMEN: Soft, nontender, nondistended. Colostomy bag with green watery fecal matter.  EXTREMITIES: No LE edema  NERVOUS SYSTEM:  A&Ox2, moving all extremities, no focal deficits   SKIN: No rashes or lesions

## 2025-02-21 NOTE — CONSULT NOTE ADULT - PROBLEM SELECTOR RECOMMENDATION 2
Found to have ileus at [facility]  - On IVF and NPO Family reported that patient has been refusing to eat to nausea   - ECG (05.21.24 @ 06:41)  ms, advise to obtain new EKG  - Consider Zofran 4mg q8hrs PRN, advise to give prior to meals

## 2025-02-21 NOTE — ADVANCED PRACTICE NURSE CONSULT - ASSESSMENT
Arrived on [address], patient was found lying in a low air loss pressure redistribution support surface style bed. Patient was awake, but confused. Ms. Gabriel was able to turn with some assistance. Once turned, able to view her skin. Patient with a Pure Wick external catheter for urinary diversion.  Patient with a RLQ Ileostomy for fecal diversion.     Skin examination reveals a Stage I pressure injury located on the Sacrum / B/L buttocks.  The area measures approximately 9.0 x 10.0 x 0.0 cm. Surrounding skin: non-blanchable erythema. No open wounds or ulcerations. No drainage, no foul odor, no purulent drainage. Bony prominence is palpable. Applied Cavilon No-sting barrier wipe to form a gentle, breathable, waterproof, transparent coating on the skin. Covered with Allevyn Foam bordered dressing for padded protection.        Patient, and RN were educated on the importance of turning and positioning every 2 hours. The use of waffle cushion when out of bed to chair, and to shift her weight every 2 hours while in chair. The importance of keeping her skin clean and dry and to offload feet/heels, and optimal nutrition.         When the consultation was completed, the patient was left in a right sided position, with side rails up, call bell within reach, and bed in lowest position. Discussed plan of care with Tracee CHAVIS).

## 2025-02-21 NOTE — H&P ADULT - PROBLEM SELECTOR PLAN 4
-Patient diagnosed with UTI at [facility] [place]    Plan:  -Will obtain UA, Ucx  -C/w ceftriaxone

## 2025-02-21 NOTE — CONSULT NOTE ADULT - TIME BILLING
Total Time Spent 70 minutes.    This includes chart review, patient assessment, discussion and collaboration with interdisciplinary team members, excluding ACP.    COUNSELING:  Face to face meeting to discuss Advanced Care Planning- Time Spent 20 minutes

## 2025-02-21 NOTE — CONSULT NOTE ADULT - CONSULT REASON
Colon Cancer
right heel DTI
GaP team consulted for complex medical decision making in the setting of serious illness

## 2025-02-21 NOTE — H&P ADULT - ATTENDING COMMENTS
Patient seen and observed at bedside. Patient is comfortable at bedside with minimal abdominal tenderness. Will obtain abdominal XR to re-assess for ileus and upload CT files from [facility] into PACS. Patient transferred here from [facility] to be under the care of Dr. Jane within the context of her malignancy. Ostomy outputting mild green stool. Will maintain NPO for now and give D5+LR. Will consult palliative for GOC discussions and quality of life considerations. Patient lacking capacity, but unable to reach daughter for capacity to make decisions or confirm home medications overnight despite attempts to do so. Please confirm medications in AM with daughter. Will give ceftriaxone and azithromycin to continue treatment for presumed underlying pneumonia and UTI diagnosed at [facility]. Will continue home levothyroxine.

## 2025-02-21 NOTE — CONSULT NOTE ADULT - ASSESSMENT
89 y/o woman presenting to OSH found to have PNA and UTI now transferred to [facility] for continuation of care and oncology care continuity.

## 2025-02-21 NOTE — H&P ADULT - PROBLEM SELECTOR PLAN 7
-DVT: Lovenox  -Diet: NPO -[facility]. Would confirm med recc with daughter in AM (not picking up phone overnight)

## 2025-02-21 NOTE — PROGRESS NOTE ADULT - ASSESSMENT
88 y/o F w/ PMH HTN. 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 Additionally with UTI, PNA, and concern for ileus.

## 2025-02-21 NOTE — CONSULT NOTE ADULT - SUBJECTIVE AND OBJECTIVE BOX
Patient is a 89y old  Female who presents with a chief complaint of Progression of colon CA (21 Feb 2025 10:55)      HPI:  Patient is poor historian and collateral from family was unable to be obtained overnight. What follows is the history signed out from [facility].    88 y/o F w/ PMH HTN. 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 She was found to have a UTI and community acquired PNA and started on ceftriaxone. CT imaging at [facility] showing progression of metastases to liver and lung. Patient and family requesting transfer to [facility] for further management given that Dr. Rama Jane is her oncologist. Dr. Jane accepted the transfer and patient admitted to medicine service for further management.    Upon arrival, patient was vitally stable and afebrile. Labs significant for WBC 11, Hgb 10.9.    (21 Feb 2025 01:09)      PAST MEDICAL & SURGICAL HISTORY:  HTN (hypertension)      Hypothyroidism      Osteoporosis      Major depression      History of subdural hematoma      Neoplasm of digestive system      S/P cystoscopy      Status post craniotomy          MEDICATIONS  (STANDING):  azithromycin  IVPB 500 milliGRAM(s) IV Intermittent every 24 hours  cefTRIAXone   IVPB 1000 milliGRAM(s) IV Intermittent every 24 hours  dextrose 5% + lactated ringers. 1000 milliLiter(s) (75 mL/Hr) IV Continuous <Continuous>  enoxaparin Injectable 40 milliGRAM(s) SubCutaneous every 24 hours  escitalopram 10 milliGRAM(s) Oral daily  levothyroxine 50 MICROGram(s) Oral daily    MEDICATIONS  (PRN):      Allergies    nebivolol (Other)    Intolerances        VITALS:    Vital Signs Last 24 Hrs  T(C): 36.4 (21 Feb 2025 04:17), Max: 36.4 (20 Feb 2025 20:00)  T(F): 97.6 (21 Feb 2025 04:17), Max: 97.6 (21 Feb 2025 04:17)  HR: 82 (21 Feb 2025 04:17) (78 - 82)  BP: 124/76 (21 Feb 2025 04:17) (107/59 - 124/76)  BP(mean): --  RR: 18 (21 Feb 2025 04:17) (18 - 18)  SpO2: 93% (21 Feb 2025 04:17) (92% - 93%)    Parameters below as of 21 Feb 2025 04:17  Patient On (Oxygen Delivery Method): room air        LABS:                          12.0   10.90 )-----------( 318      ( 21 Feb 2025 07:22 )             37.6       02-21    142  |  105  |  12  ----------------------------<  115[H]  3.7   |  24  |  0.64    Ca    9.1      21 Feb 2025 07:22  Phos  1.9     02-21  Mg     1.6     02-21    TPro  6.0  /  Alb  2.7[L]  /  TBili  0.7  /  DBili  x   /  AST  48[H]  /  ALT  15  /  AlkPhos  84  02-21      CAPILLARY BLOOD GLUCOSE              LOWER EXTREMITY PHYSICAL EXAM:    Vascular: DP palp, PT non palp B/L, CFT <3 seconds B/L, Temperature gradient WNL, B/L.   Neuro: unable to assess.  Musculoskeletal/Ortho: pain with palp of right heel  Skin: Right heel DTI secondary to pressure present on admission, no open lesions, no signs of infection

## 2025-02-21 NOTE — PROGRESS NOTE ADULT - SUBJECTIVE AND OBJECTIVE BOX
Patient is a 89y old  Female who presents with a chief complaint of Progression of colon CA (2025 11:06)      ======Overnight/Subjective======  Overnight    Subjective    Brief daily plan    ======Medications======  MEDICATIONS  (STANDING):  azithromycin  IVPB 500 milliGRAM(s) IV Intermittent every 24 hours  cefTRIAXone   IVPB 1000 milliGRAM(s) IV Intermittent every 24 hours  dextrose 5% + lactated ringers. 1000 milliLiter(s) (75 mL/Hr) IV Continuous <Continuous>  enoxaparin Injectable 40 milliGRAM(s) SubCutaneous every 24 hours  escitalopram 10 milliGRAM(s) Oral daily  levothyroxine 50 MICROGram(s) Oral daily  potassium phosphate IVPB 15 milliMole(s) IV Intermittent once    MEDICATIONS  (PRN):      ======Vital Signs======  T(C): 36.3 (25 @ 11:45), Max: 36.4 (25 @ 20:00)  T(F): 97.3 (25 @ 11:45), Max: 97.6 (25 @ 04:17)  HR: 95 (25 @ 11:45) (78 - 95)  BP: 105/56 (25 @ 11:45) (105/56 - 124/76)  BP(mean): --  RR: 18 (25 @ 11:45) (18 - 18)  SpO2: 97% (25 @ 11:45) (92% - 97%)    ======Physical exams======  Physical Exam: GENERAL: NAD, lying in bed with eyes squeezed shut  HEAD:  Atraumatic, normocephalic  EYES: wouldnt open her eyes  HEART: Regular rate and rhythm, no murmurs, rubs, or gallops  LUNGS: Unlabored respirations.  Clear to auscultation bilaterally, no crackles, wheezing, or rhonchi  ABDOMEN: Soft, nontender, nondistended. Colostomy bag with green watery fecal matter.  EXTREMITIES: No LE edema  NERVOUS SYSTEM:  A&Ox0, moving all extremities  SKIN: No rashes or lesions    ======Labs======                12.0  10.90[H] >----------< 318  (MCV: 95.2)                37.6   142 | 105 | 12  -----------------------< 115[H]  3.7 | 24 | 0.64    TPro: 6.0 / Alb: 2.7[L] / TBili: 0.7 / DBili: -- / AlkPhos: 84 / ALT: 15 / AST: 48[H] (25 @ 07:22)  Ca: 9.1 / Phos: 1.9[L] / M.6 (25 @ 07:22)    Gas: 7.38 / 47[H] / 28 / 28 / 47.3[L]% / 2.0 (25 @ 07:18)      ======Microbiology======  Urinalysis Basic - ( 2025 07:22 )    Color: x / Appearance: x / SG: x / pH: x  Gluc: 115 mg/dL / Ketone: x  / Bili: x / Urobili: x   Blood: x / Protein: x / Nitrite: x   Leuk Esterase: x / RBC: x / WBC x   Sq Epi: x / Non Sq Epi: x / Bacteria: x          ======I&O's======  I&O's Summary    2025 07:  -  2025 07:00  --------------------------------------------------------  IN: 0 mL / OUT: 300 mL / NET: -300 mL    2025 07:  -  2025 12:31  --------------------------------------------------------  IN: 0 mL / OUT: 0 mL / NET: 0 mL

## 2025-02-21 NOTE — H&P ADULT - HISTORY OF PRESENT ILLNESS
89 y/o F w/ PMH HTN. 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 She was found to have a UTI and community acquired PNA and started on ceftriaxone. CT imaging at [facility] showing progression of metastases to liver and lung. Patient and family requesting transfer to [facility] for further management given that Dr. Rama aJne is her oncologist. Dr. Jane accepted the transfer and patient admitted to medicine service for further management.    Upon arrival, patient was vitally stable and afebrile. Labs significant for WBC 11, Hgb 10.9.    Patient is poor historian and collateral from family was unable to be obtained overnight. What follows is the history signed out from [facility].    89 y/o F w/ PMH HTN. 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 She was found to have a UTI and community acquired PNA and started on ceftriaxone. CT imaging at [facility] showing progression of metastases to liver and lung. Patient and family requesting transfer to [facility] for further management given that Dr. Rama Jane is her oncologist. Dr. Jane accepted the transfer and patient admitted to medicine service for further management.    Upon arrival, patient was vitally stable and afebrile. Labs significant for WBC 11, Hgb 10.9.

## 2025-02-21 NOTE — CONSULT NOTE ADULT - ATTENDING COMMENTS
88 y/o woman presenting to OS found to have PNA and UTI now transferred to [facility] for continuation of care and oncology care continuity.  Can check CEA, CA 19-9, and  levels  -Will need to review imaging from [facility] to assess for esophageal mass. If patient w/ esophageal mass as cause of nausea would recommend GI evaluation. Otherwise if no distinct mass consider speech and swallow evaluation.   -Infectious management per primary team  -Continued GOC discussion with the family pending     Oncology to follow with you.

## 2025-02-21 NOTE — H&P ADULT - NSHPLABSRESULTS_GEN_ALL_CORE
.  LABS:                         10.9   11.13 )-----------( 304      ( 20 Feb 2025 21:03 )             34.8     02-20    144  |  106  |  13  ----------------------------<  93  3.9   |  29  |  0.63    Ca    9.0      20 Feb 2025 21:03  Phos  2.4     02-20  Mg     1.6     02-20    TPro  6.0  /  Alb  2.8[L]  /  TBili  0.8  /  DBili  x   /  AST  26  /  ALT  13  /  AlkPhos  86  02-20      Urinalysis Basic - ( 20 Feb 2025 21:03 )    Color: x / Appearance: x / SG: x / pH: x  Gluc: 93 mg/dL / Ketone: x  / Bili: x / Urobili: x   Blood: x / Protein: x / Nitrite: x   Leuk Esterase: x / RBC: x / WBC x   Sq Epi: x / Non Sq Epi: x / Bacteria: x            RADIOLOGY, EKG & ADDITIONAL TESTS: Reviewed.

## 2025-02-21 NOTE — PROGRESS NOTE ADULT - ATTENDING COMMENTS
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 Additionally with UTI, PNA, and concern for ileus.     #Ileus.   - CT imaging done at [place], CD in chart; no evidence of ileus  - Patient with benign abdominal exam  - Pending abdominal XR  - pending repeat CT     #Colon cancer.   -S/p abdominal colectomy w/ end ileostomy  -Scans from [place] showing progression of metastases to liver and lung  - heme onc to discuss on Monday    #PNA #UTI  - c/w CTX and Azithro (will tx full course, interupted abx reported at [facility])  - F/u blood cultures, urine cultures, urine strep, urine legionella.    #Hypothyroidism.   - c/w home levothyroxine 50mcg.    #GOC  - palliative on board  - pt will think about code status, wants definitive answer on cancer progression and prognosis first

## 2025-02-22 DIAGNOSIS — R13.10 DYSPHAGIA, UNSPECIFIED: ICD-10-CM

## 2025-02-22 LAB
ALBUMIN SERPL ELPH-MCNC: 2.5 G/DL — LOW (ref 3.3–5)
ALP SERPL-CCNC: 80 U/L — SIGNIFICANT CHANGE UP (ref 40–120)
ALT FLD-CCNC: 12 U/L — SIGNIFICANT CHANGE UP (ref 10–45)
ANION GAP SERPL CALC-SCNC: 12 MMOL/L — SIGNIFICANT CHANGE UP (ref 5–17)
AST SERPL-CCNC: 27 U/L — SIGNIFICANT CHANGE UP (ref 10–40)
BASOPHILS # BLD AUTO: 0.02 K/UL — SIGNIFICANT CHANGE UP (ref 0–0.2)
BASOPHILS NFR BLD AUTO: 0.2 % — SIGNIFICANT CHANGE UP (ref 0–2)
BILIRUB SERPL-MCNC: 0.6 MG/DL — SIGNIFICANT CHANGE UP (ref 0.2–1.2)
BUN SERPL-MCNC: 12 MG/DL — SIGNIFICANT CHANGE UP (ref 7–23)
CALCIUM SERPL-MCNC: 8.5 MG/DL — SIGNIFICANT CHANGE UP (ref 8.4–10.5)
CANCER AG125 SERPL-ACNC: 46 U/ML — HIGH
CANCER AG19-9 SERPL-ACNC: 4423 U/ML — HIGH
CEA SERPL-MCNC: 169 NG/ML — HIGH (ref 0–3.8)
CHLORIDE SERPL-SCNC: 103 MMOL/L — SIGNIFICANT CHANGE UP (ref 96–108)
CO2 SERPL-SCNC: 24 MMOL/L — SIGNIFICANT CHANGE UP (ref 22–31)
CREAT SERPL-MCNC: 0.72 MG/DL — SIGNIFICANT CHANGE UP (ref 0.5–1.3)
EGFR: 81 ML/MIN/1.73M2 — SIGNIFICANT CHANGE UP
EOSINOPHIL # BLD AUTO: 0.08 K/UL — SIGNIFICANT CHANGE UP (ref 0–0.5)
EOSINOPHIL NFR BLD AUTO: 0.8 % — SIGNIFICANT CHANGE UP (ref 0–6)
GLUCOSE SERPL-MCNC: 186 MG/DL — HIGH (ref 70–99)
HCT VFR BLD CALC: 33.3 % — LOW (ref 34.5–45)
HGB BLD-MCNC: 10.6 G/DL — LOW (ref 11.5–15.5)
IMM GRANULOCYTES NFR BLD AUTO: 1 % — HIGH (ref 0–0.9)
LEGIONELLA AG UR QL: NEGATIVE — SIGNIFICANT CHANGE UP
LYMPHOCYTES # BLD AUTO: 1.43 K/UL — SIGNIFICANT CHANGE UP (ref 1–3.3)
LYMPHOCYTES # BLD AUTO: 14.4 % — SIGNIFICANT CHANGE UP (ref 13–44)
MAGNESIUM SERPL-MCNC: 1.4 MG/DL — LOW (ref 1.6–2.6)
MCHC RBC-ENTMCNC: 29.9 PG — SIGNIFICANT CHANGE UP (ref 27–34)
MCHC RBC-ENTMCNC: 31.8 G/DL — LOW (ref 32–36)
MCV RBC AUTO: 93.8 FL — SIGNIFICANT CHANGE UP (ref 80–100)
MONOCYTES # BLD AUTO: 0.83 K/UL — SIGNIFICANT CHANGE UP (ref 0–0.9)
MONOCYTES NFR BLD AUTO: 8.4 % — SIGNIFICANT CHANGE UP (ref 2–14)
NEUTROPHILS # BLD AUTO: 7.46 K/UL — HIGH (ref 1.8–7.4)
NEUTROPHILS NFR BLD AUTO: 75.2 % — SIGNIFICANT CHANGE UP (ref 43–77)
NRBC BLD AUTO-RTO: 0 /100 WBCS — SIGNIFICANT CHANGE UP (ref 0–0)
PHOSPHATE SERPL-MCNC: 2.5 MG/DL — SIGNIFICANT CHANGE UP (ref 2.5–4.5)
PLATELET # BLD AUTO: 320 K/UL — SIGNIFICANT CHANGE UP (ref 150–400)
POTASSIUM SERPL-MCNC: 3.5 MMOL/L — SIGNIFICANT CHANGE UP (ref 3.5–5.3)
POTASSIUM SERPL-SCNC: 3.5 MMOL/L — SIGNIFICANT CHANGE UP (ref 3.5–5.3)
PROT SERPL-MCNC: 5.4 G/DL — LOW (ref 6–8.3)
RBC # BLD: 3.55 M/UL — LOW (ref 3.8–5.2)
RBC # FLD: 14.5 % — SIGNIFICANT CHANGE UP (ref 10.3–14.5)
SODIUM SERPL-SCNC: 139 MMOL/L — SIGNIFICANT CHANGE UP (ref 135–145)
WBC # BLD: 9.92 K/UL — SIGNIFICANT CHANGE UP (ref 3.8–10.5)
WBC # FLD AUTO: 9.92 K/UL — SIGNIFICANT CHANGE UP (ref 3.8–10.5)

## 2025-02-22 PROCEDURE — 99233 SBSQ HOSP IP/OBS HIGH 50: CPT | Mod: GC

## 2025-02-22 RX ORDER — DIPHENOXYLATE HYDROCHLORIDE AND ATROPINE SULFATE .025; 2.5 MG/1; MG/1
2 TABLET ORAL
Refills: 0 | DISCHARGE

## 2025-02-22 RX ORDER — SIMETHICONE 80 MG
1 TABLET,CHEWABLE ORAL
Refills: 0 | DISCHARGE

## 2025-02-22 RX ORDER — MIRTAZAPINE 30 MG/1
1 TABLET, FILM COATED ORAL
Refills: 0 | DISCHARGE

## 2025-02-22 RX ORDER — MIRTAZAPINE 30 MG/1
2 TABLET, FILM COATED ORAL
Refills: 0 | DISCHARGE

## 2025-02-22 RX ORDER — DIPHENOXYLATE HYDROCHLORIDE AND ATROPINE SULFATE .025; 2.5 MG/1; MG/1
1 TABLET ORAL
Refills: 0 | Status: DISCONTINUED | OUTPATIENT
Start: 2025-02-22 | End: 2025-02-23

## 2025-02-22 RX ORDER — LEVOTHYROXINE SODIUM 300 MCG
75 TABLET ORAL DAILY
Refills: 0 | Status: DISCONTINUED | OUTPATIENT
Start: 2025-02-22 | End: 2025-02-28

## 2025-02-22 RX ORDER — LEVOTHYROXINE SODIUM 300 MCG
1 TABLET ORAL
Refills: 0 | DISCHARGE

## 2025-02-22 RX ORDER — FERROUS SULFATE 137(45) MG
1 TABLET, EXTENDED RELEASE ORAL
Refills: 0 | DISCHARGE

## 2025-02-22 RX ORDER — ESCITALOPRAM OXALATE 20 MG/1
1 TABLET ORAL
Refills: 0 | DISCHARGE

## 2025-02-22 RX ORDER — POTASSIUM PHOSPHATE, MONOBASIC POTASSIUM PHOSPHATE, DIBASIC INJECTION, 236; 224 MG/ML; MG/ML
30 SOLUTION, CONCENTRATE INTRAVENOUS ONCE
Refills: 0 | Status: COMPLETED | OUTPATIENT
Start: 2025-02-22 | End: 2025-02-22

## 2025-02-22 RX ORDER — SIMETHICONE 80 MG
80 TABLET,CHEWABLE ORAL
Refills: 0 | Status: DISCONTINUED | OUTPATIENT
Start: 2025-02-22 | End: 2025-02-28

## 2025-02-22 RX ORDER — MIRTAZAPINE 30 MG/1
7.5 TABLET, FILM COATED ORAL ONCE
Refills: 0 | Status: COMPLETED | OUTPATIENT
Start: 2025-02-22 | End: 2025-02-22

## 2025-02-22 RX ADMIN — Medication 250 MILLIGRAM(S): at 04:52

## 2025-02-22 RX ADMIN — Medication 4 GRAM(S): at 15:49

## 2025-02-22 RX ADMIN — POTASSIUM PHOSPHATE, MONOBASIC POTASSIUM PHOSPHATE, DIBASIC INJECTION, 83.33 MILLIMOLE(S): 236; 224 SOLUTION, CONCENTRATE INTRAVENOUS at 15:48

## 2025-02-22 RX ADMIN — ESCITALOPRAM OXALATE 10 MILLIGRAM(S): 20 TABLET ORAL at 12:03

## 2025-02-22 RX ADMIN — MIRTAZAPINE 7.5 MILLIGRAM(S): 30 TABLET, FILM COATED ORAL at 21:50

## 2025-02-22 RX ADMIN — DIPHENOXYLATE HYDROCHLORIDE AND ATROPINE SULFATE 1 TABLET(S): .025; 2.5 TABLET ORAL at 17:50

## 2025-02-22 RX ADMIN — CEFTRIAXONE 100 MILLIGRAM(S): 500 INJECTION, POWDER, FOR SOLUTION INTRAMUSCULAR; INTRAVENOUS at 04:53

## 2025-02-22 RX ADMIN — Medication 50 MICROGRAM(S): at 05:10

## 2025-02-22 RX ADMIN — DIPHENOXYLATE HYDROCHLORIDE AND ATROPINE SULFATE 1 TABLET(S): .025; 2.5 TABLET ORAL at 13:29

## 2025-02-22 RX ADMIN — ENOXAPARIN SODIUM 40 MILLIGRAM(S): 100 INJECTION SUBCUTANEOUS at 04:53

## 2025-02-22 NOTE — DIETITIAN INITIAL EVALUATION ADULT - NSFNSGIIOFT_GEN_A_CORE
02-21-25 @ 07:01  -  02-22-25 @ 07:00  --------------------------------------------------------  OUT:    Colostomy (mL): 125 mL  Total OUT: 125 mL    Total NET: -125 mL

## 2025-02-22 NOTE — DIETITIAN INITIAL EVALUATION ADULT - PERTINENT LABORATORY DATA
02-22    139  |  103  |  12  ----------------------------<  186[H]  3.5   |  24  |  0.72    Ca    8.5      22 Feb 2025 07:11  Phos  2.5     02-22  Mg     1.4     02-22    TPro  5.4[L]  /  Alb  2.5[L]  /  TBili  0.6  /  DBili  x   /  AST  27  /  ALT  12  /  AlkPhos  80  02-22  A1C with Estimated Average Glucose Result: 5.0 % (05-16-24 @ 11:20)

## 2025-02-22 NOTE — PROGRESS NOTE ADULT - PROBLEM SELECTOR PLAN 5
-Patient taking nebivolol at home but stopped at [facility]    Plan:  -Patient normotensive. Will continue to hold nebivolol -C/w home levothyroxine 75mcg -Patient taking nebivolol at home but stopped at [facility]    Plan:  -Patient normotensive. Will continue to hold nebivolol

## 2025-02-22 NOTE — PROGRESS NOTE ADULT - PROBLEM SELECTOR PLAN 3
-Patient getting ceftriaxone at [facility] for PNA    Plan:  -CXR ordered  -Start CTX and azithromycin  -F/u blood cultures, urine cultures, urine strep, urine legionella -Patient diagnosed with UTI at [facility]  -Treatment unclear    Plan:  -C/w ceftriaxone -Patient getting ceftriaxone at [facility] for PNA    Plan:  -CXR ordered  -Start CTX and azithromycin  -F/u blood cultures, urine cultures, urine strep, urine legionella

## 2025-02-22 NOTE — DIETITIAN INITIAL EVALUATION ADULT - PROBLEM SELECTOR PLAN 5
-Patient taking nebivolol at home but stopped at [facility]    Plan:  -Patient normotensive. Will continue to hold nebivolol

## 2025-02-22 NOTE — PROGRESS NOTE ADULT - SUBJECTIVE AND OBJECTIVE BOX
Patient is a 89y old  Female who presents with a chief complaint of Progression of colon CA (2025 13:18)      ======Overnight/Subjective======  Overnight, no events. Patient is not communicative in the mornings.        ======Medications======  MEDICATIONS  (STANDING):  azithromycin  IVPB 500 milliGRAM(s) IV Intermittent every 24 hours  cefTRIAXone   IVPB 1000 milliGRAM(s) IV Intermittent every 24 hours  dextrose 5% + lactated ringers. 1000 milliLiter(s) (75 mL/Hr) IV Continuous <Continuous>  enoxaparin Injectable 40 milliGRAM(s) SubCutaneous every 24 hours  escitalopram 10 milliGRAM(s) Oral daily  levothyroxine 50 MICROGram(s) Oral daily  potassium phosphate IVPB 30 milliMole(s) IV Intermittent once    MEDICATIONS  (PRN):      ======Vital Signs======  T(C): 36.4 (25 @ 04:29), Max: 36.9 (25 @ 19:58)  T(F): 97.5 (25 @ 04:29), Max: 98.4 (-25 @ 19:58)  HR: 97 (25 @ 04:29) (90 - 97)  BP: 118/60 (25 @ 04:29) (105/56 - 118/60)  BP(mean): --  RR: 18 (25 @ 04:29) (18 - 18)  SpO2: 95% (25 @ 04:29) (93% - 97%)    ======Physical exams======  GENERAL: NAD  Cardiovascular: RRR, S1 S2, no m/r/g  LUNGS: Unlabored respiration, CTABL  ABDOMEN: Soft, NTND, ostomy filled with  EXTREMITIES: Warm extremities, no edema, peripheral pulses 2+ bilaterally  NEURO: AAOx2  ======Labs======                10.6[L]  9.92 >----------< 320  (MCV: 93.8)                33.3[L]   142 | 105 | 12  -----------------------< 115[H]  3.7 | 24 | 0.64    TPro: 6.0 / Alb: 2.7[L] / TBili: 0.7 / DBili: -- / AlkPhos: 84 / ALT: 15 / AST: 48[H] (25 @ 07:22)  Ca: 9.1 / Phos: 1.9[L] / M.6 (25 @ 07:22)    Gas: 7.38 / 47[H] / 28 / 28 / 47.3[L]% / 2.0 (25 @ 07:18)      ======Microbiology======  Urinalysis Basic - ( 2025 16:08 )    Color: Yellow / Appearance: Turbid / S.024 / pH: x  Gluc: x / Ketone: Negative mg/dL  / Bili: Negative / Urobili: 0.2 mg/dL   Blood: x / Protein: 30 mg/dL / Nitrite: Negative   Leuk Esterase: Negative / RBC: 2 /HPF / WBC 2 /HPF   Sq Epi: x / Non Sq Epi: 3 /HPF / Bacteria: Negative /HPF          ======I&O's======  I&O's Summary    2025 07:01  -  2025 07:00  --------------------------------------------------------  IN: 625 mL / OUT: 125 mL / NET: 500 mL         Patient is a 89y old  Female who presents with a chief complaint of Progression of colon CA (2025 13:18)      ======Overnight/Subjective======  Overnight, no events. Patient is not communicative in the mornings but went later in the morning and she was awake and aware. Denies headaches, dizziness, chest pain, sob, nausea, vomiting, hematuria, hematochezia. Endorses daily BM (can see in ostomy bag).        ======Medications======  MEDICATIONS  (STANDING):  azithromycin  IVPB 500 milliGRAM(s) IV Intermittent every 24 hours  cefTRIAXone   IVPB 1000 milliGRAM(s) IV Intermittent every 24 hours  dextrose 5% + lactated ringers. 1000 milliLiter(s) (75 mL/Hr) IV Continuous <Continuous>  enoxaparin Injectable 40 milliGRAM(s) SubCutaneous every 24 hours  escitalopram 10 milliGRAM(s) Oral daily  levothyroxine 50 MICROGram(s) Oral daily  potassium phosphate IVPB 30 milliMole(s) IV Intermittent once    MEDICATIONS  (PRN):      ======Vital Signs======  T(C): 36.4 (25 @ 04:29), Max: 36.9 (25 @ 19:58)  T(F): 97.5 (25 @ 04:29), Max: 98.4 (25 @ 19:58)  HR: 97 (25 @ 04:29) (90 - 97)  BP: 118/60 (25 @ 04:29) (105/56 - 118/60)  BP(mean): --  RR: 18 (25 @ 04:29) (18 - 18)  SpO2: 95% (25 @ 04:29) (93% - 97%)    ======Physical exams======  GENERAL: NAD, cachectic appearing  Cardiovascular: RRR, S1 S2, no m/r/g  LUNGS: Unlabored respiration, CTABL  ABDOMEN: Soft, NTND, ostomy filled with brown stool  EXTREMITIES: Warm extremities, no edema  NEURO: Alert and oriented  ======Labs======                10.6[L]  9.92 >----------< 320  (MCV: 93.8)                33.3[L]   142 | 105 | 12  -----------------------< 115[H]  3.7 | 24 | 0.64    TPro: 6.0 / Alb: 2.7[L] / TBili: 0.7 / DBili: -- / AlkPhos: 84 / ALT: 15 / AST: 48[H] (25 @ 07:22)  Ca: 9.1 / Phos: 1.9[L] / M.6 (25 @ 07:22)    Gas: 7.38 / 47[H] / 28 / 28 / 47.3[L]% / 2.0 (25 @ 07:18)      ======Microbiology======  Urinalysis Basic - ( 2025 16:08 )    Color: Yellow / Appearance: Turbid / S.024 / pH: x  Gluc: x / Ketone: Negative mg/dL  / Bili: Negative / Urobili: 0.2 mg/dL   Blood: x / Protein: 30 mg/dL / Nitrite: Negative   Leuk Esterase: Negative / RBC: 2 /HPF / WBC 2 /HPF   Sq Epi: x / Non Sq Epi: 3 /HPF / Bacteria: Negative /HPF          ======I&O's======  I&O's Summary    2025 07:01  -  2025 07:00  --------------------------------------------------------  IN: 625 mL / OUT: 125 mL / NET: 500 mL

## 2025-02-22 NOTE — DIETITIAN INITIAL EVALUATION ADULT - PROBLEM SELECTOR PLAN 2
-Patient with a hx of colon cancer  -S/p abdominal colectomy w/ end ileostomy  -Scans from [place] showing progression of metastases to liver and lung    Plan:  -Will upload CT scan images into PACS  -Reach out to Dr. Jane in AM  -Palliative consulted for assistance with GOC

## 2025-02-22 NOTE — PROGRESS NOTE ADULT - PROBLEM SELECTOR PLAN 4
-Patient diagnosed with UTI at [facility]  -Treatment unclear    Plan:  -C/w ceftriaxone -Patient taking nebivolol at home but stopped at [facility]    Plan:  -Patient normotensive. Will continue to hold nebivolol -Patient diagnosed with UTI at [facility]  -Treatment unclear    Plan:  -C/w ceftriaxone

## 2025-02-22 NOTE — DIETITIAN INITIAL EVALUATION ADULT - PROBLEM SELECTOR PLAN 7
-Medications from recent stay at [facility] added. Would confirm med recc with daughter in AM (not picking up phone overnight)

## 2025-02-22 NOTE — PROGRESS NOTE ADULT - PROBLEM SELECTOR PLAN 1
-Per report, patient with concern for ileus at [facility]  -Patient presented with post-tussive emesis  -CT imaging done at [place], CD in chart; no evidence of ileus  -Patient with benign abdominal exam    Plan:  -Will maintain NPO  -Will monitor  -Start D5+LR at 75 cc/hr -Patient with a hx of colon cancer (initial appt 7/2024)  -S/p abdominal colectomy w/ end ileostomy (may 2024, c/b hypotension + sicu stay)  -Scans from [place] showing progression of metastases to liver and lung; initial stage in july was III s/p resection with negative margins  -new ct here shows metastasis in the lung/liver and adrenal glands  -initally was c/f ileus but this is not present on our CT and pt's ostomy is full of stool    Plan:  -heme onc following  -Palliative consulted for assistance with GOC  -c/w simethicone, lomotil  -c/w mirtazipine   -c/w lexapro  -c/w questran -Patient with a hx of colon cancer (initial appt 7/2024)  -S/p abdominal colectomy w/ end ileostomy (may 2024, c/b hypotension + sicu stay)  -Scans from [place] showing progression of metastases to liver and lung; initial stage in july was III s/p resection with negative margins  -new ct here shows metastasis in the lung/liver and adrenal glands  -initally was c/f ileus but this is not present on our CT and pt's ostomy is full of stool    Plan:  -heme onc following, Dr. Jane back on Monday  -Palliative consulted for assistance with GOC  -c/w simethicone, lomotil  -c/w mirtazipine   -c/w lexapro  -c/w questran

## 2025-02-22 NOTE — DIETITIAN INITIAL EVALUATION ADULT - PERTINENT MEDS FT
MEDICATIONS  (STANDING):  azithromycin  IVPB 500 milliGRAM(s) IV Intermittent every 24 hours  cefTRIAXone   IVPB 1000 milliGRAM(s) IV Intermittent every 24 hours  cholestyramine Powder (Sugar-Free) 4 Gram(s) Oral two times a day  dextrose 5% + lactated ringers. 1000 milliLiter(s) (75 mL/Hr) IV Continuous <Continuous>  diphenoxylate/atropine 1 Tablet(s) Oral two times a day  enoxaparin Injectable 40 milliGRAM(s) SubCutaneous every 24 hours  escitalopram 10 milliGRAM(s) Oral daily  levothyroxine 75 MICROGram(s) Oral daily  mirtazapine 7.5 milliGRAM(s) Oral once  potassium phosphate IVPB 30 milliMole(s) IV Intermittent once    MEDICATIONS  (PRN):  simethicone 80 milliGRAM(s) Chew two times a day PRN Gas

## 2025-02-22 NOTE — DIETITIAN INITIAL EVALUATION ADULT - NSFNSPHYEXAMSKINFT_GEN_A_CORE
Pressure Injury 1: none, none  Pressure Injury 2: Bilateral:, buttocks, sacrum, Stage I  Pressure Injury 3: none, none  Pressure Injury 4: none, none  Pressure Injury 5: none, none  Pressure Injury 6: none, none  Pressure Injury 7: none, none  Pressure Injury 8: none, none  Pressure Injury 9: none, none  Pressure Injury 10: none, none  Pressure Injury 11: none, none

## 2025-02-22 NOTE — PROGRESS NOTE ADULT - ATTENDING COMMENTS
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 Additionally with UTI, PNA, and concern for ileus.     #Ileus. - CT imaging done at [place], CD in chart; no evidence of ileus. Patient with benign abdominal exam. Repeat CT here shows metastases in the lung/liver and adrenal glands    #Colon cancer. -S/p abdominal colectomy w/ end ileostomy-Scans from [place] showing progression of metastases to liver and lung- heme onc to discuss on Monday    #PNA #UTI- c/w CTX and Azithro (will tx full course, interupted abx reported at [facility])- F/u blood cultures, urine cultures, urine strep, urine legionella.    #GOC- palliative on board- pt will think about code status, wants definitive answer on cancer progression and prognosis first

## 2025-02-22 NOTE — PROGRESS NOTE ADULT - ASSESSMENT
88 y/o F w/ PMH HTN. 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 89 y/o F w/ PMH HTN. 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

## 2025-02-22 NOTE — DIETITIAN INITIAL EVALUATION ADULT - PROBLEM SELECTOR PLAN 3
-Patient getting ceftriaxone at [facility] for PNA    Plan:  -CXR ordered  -Start CTX and azithromycin  -F/u blood cultures, urine cultures, urine strep, urine legionella

## 2025-02-22 NOTE — DIETITIAN INITIAL EVALUATION ADULT - PROBLEM SELECTOR PLAN 4
-Patient diagnosed with UTI at [facility]    Plan:  -Will obtain UA, Ucx  -C/w ceftriaxone

## 2025-02-22 NOTE — DIETITIAN INITIAL EVALUATION ADULT - PROBLEM SELECTOR PLAN 1
-Per report, patient with concern for ileus at [facility]  -Patient presented with post-tussive emesis  -CT imaging done at [place], CD in chart  -Patient with benign abdominal exam    Plan:  -Will maintain NPO  -Obtain abdominal X-ray  -Start D5+LR at 75 cc/hr  -CD already provided to CT tech in ED so images can be uploaded to PACS  -If evidence of ileus on scans can consult surgery in AM

## 2025-02-22 NOTE — PROGRESS NOTE ADULT - PROBLEM SELECTOR PLAN 2
-Patient with a hx of colon cancer (initial appt 7/2024)  -S/p abdominal colectomy w/ end ileostomy (may 2024, c/b hypotension + sicu stay)  -Scans from [place] showing progression of metastases to liver and lung; initial stage in july was III s/p resection with negative margins    Plan:  -heme onc following  -Palliative consulted for assistance with GOC  -f/u new abdominal scan done 2/21  -c/w simethicone, lomotil  -c/w mirtazipine   -c/w lexapro  -c/w questran -Patient getting ceftriaxone at [facility] for PNA    Plan:  -CXR ordered  -Start CTX and azithromycin  -F/u blood cultures, urine cultures, urine strep, urine legionella Patient had been experiencing difficulty swallowing which led to anorexia prior to admission. At [facility] they noted a mass in her esophagus.    PLAN:  -bedside dysphagia screen  -S and S consulted

## 2025-02-22 NOTE — PROGRESS NOTE ADULT - PROBLEM SELECTOR PLAN 7
-Medications from recent stay at [facility] added  -Per son, patient wakes up a little confused but is baseline AO X3    Plan:  -updated medication reconciliation with info from son, who administers her meds at home -DVT: Lovenox  -Diet: NPO -Medications from recent stay at [facility] added  -Per son, patient wakes up a little confused but is baseline AO X3    Plan:  -updated medication reconciliation with info from son, who administers her meds at home

## 2025-02-22 NOTE — PROGRESS NOTE ADULT - PROBLEM SELECTOR PLAN 6
-C/w home levothyroxine 75mcg -Medications from recent stay at [facility] added  -Per son, patient wakes up a little confused but is baseline AO X3    Plan:  -updated medication reconciliation with info from son, who administers her meds at home

## 2025-02-23 PROCEDURE — 99232 SBSQ HOSP IP/OBS MODERATE 35: CPT | Mod: GC

## 2025-02-23 RX ORDER — SODIUM CHLORIDE 9 G/1000ML
1000 INJECTION, SOLUTION INTRAVENOUS
Refills: 0 | Status: DISCONTINUED | OUTPATIENT
Start: 2025-02-23 | End: 2025-02-23

## 2025-02-23 RX ORDER — SODIUM CHLORIDE 9 G/1000ML
1000 INJECTION, SOLUTION INTRAVENOUS
Refills: 0 | Status: DISCONTINUED | OUTPATIENT
Start: 2025-02-23 | End: 2025-02-24

## 2025-02-23 RX ORDER — MAGNESIUM SULFATE 500 MG/ML
2 SYRINGE (ML) INJECTION ONCE
Refills: 0 | Status: COMPLETED | OUTPATIENT
Start: 2025-02-23 | End: 2025-02-23

## 2025-02-23 RX ADMIN — DIPHENOXYLATE HYDROCHLORIDE AND ATROPINE SULFATE 1 TABLET(S): .025; 2.5 TABLET ORAL at 05:37

## 2025-02-23 RX ADMIN — Medication 4 GRAM(S): at 09:08

## 2025-02-23 RX ADMIN — SODIUM CHLORIDE 75 MILLILITER(S): 9 INJECTION, SOLUTION INTRAVENOUS at 02:44

## 2025-02-23 RX ADMIN — Medication 25 GRAM(S): at 18:44

## 2025-02-23 RX ADMIN — SODIUM CHLORIDE 50 MILLILITER(S): 9 INJECTION, SOLUTION INTRAVENOUS at 18:44

## 2025-02-23 RX ADMIN — Medication 4 GRAM(S): at 20:20

## 2025-02-23 RX ADMIN — ESCITALOPRAM OXALATE 10 MILLIGRAM(S): 20 TABLET ORAL at 12:40

## 2025-02-23 RX ADMIN — Medication 75 MICROGRAM(S): at 05:37

## 2025-02-23 RX ADMIN — ENOXAPARIN SODIUM 40 MILLIGRAM(S): 100 INJECTION SUBCUTANEOUS at 04:21

## 2025-02-23 RX ADMIN — SODIUM CHLORIDE 50 MILLILITER(S): 9 INJECTION, SOLUTION INTRAVENOUS at 20:20

## 2025-02-23 RX ADMIN — Medication 250 MILLIGRAM(S): at 04:22

## 2025-02-23 RX ADMIN — CEFTRIAXONE 100 MILLIGRAM(S): 500 INJECTION, POWDER, FOR SOLUTION INTRAMUSCULAR; INTRAVENOUS at 04:22

## 2025-02-23 NOTE — PROGRESS NOTE ADULT - PROBLEM SELECTOR PLAN 8
-DVT: Lovenox  -Diet: NPO -DVT: Lovenox  -Diet: Soft and bite sized, pending further S+S eval  -GOC: Full code pending further assessment by oncology team

## 2025-02-23 NOTE — PROGRESS NOTE ADULT - ASSESSMENT
88 y/o F w/ PMH HTN. 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

## 2025-02-23 NOTE — PROGRESS NOTE ADULT - PROBLEM SELECTOR PLAN 4
-Patient diagnosed with UTI at [facility]  -Treatment unclear    Plan:  -C/w ceftriaxone -Patient diagnosed with UTI at [facility]  -Treatment unclear    Plan:  -C/w ceftriaxone (2/21 - )

## 2025-02-23 NOTE — PROGRESS NOTE ADULT - PROBLEM SELECTOR PLAN 2
Patient had been experiencing difficulty swallowing which led to anorexia prior to admission. At [facility] they noted a mass in her esophagus.    PLAN:  -bedside dysphagia screen  -S and S consulted Patient had been experiencing difficulty swallowing which led to anorexia prior to admission. At [facility] they noted a mass in her esophagus.    PLAN:  -bedside dysphagia screen  -S and S consulted, attempted eval on 2/23 but would not accept food and says she has no appetite; will reeval on 2/24

## 2025-02-23 NOTE — PROGRESS NOTE ADULT - PROBLEM SELECTOR PLAN 5
-Patient taking nebivolol at home but stopped at [facility]    Plan:  -Patient normotensive. Will continue to hold nebivolol

## 2025-02-23 NOTE — PROGRESS NOTE ADULT - PROBLEM SELECTOR PLAN 1
-Patient with a hx of colon cancer (initial appt 7/2024)  -S/p abdominal colectomy w/ end ileostomy (may 2024, c/b hypotension + sicu stay)  -Scans from [place] showing progression of metastases to liver and lung; initial stage in july was III s/p resection with negative margins  -new ct here shows metastasis in the lung/liver and adrenal glands  -initally was c/f ileus but this is not present on our CT and pt's ostomy is full of stool    Plan:  -heme onc following, Dr. Jane back on Monday  -Palliative consulted for assistance with GOC  -c/w simethicone, lomotil  -c/w mirtazipine   -c/w lexapro  -c/w questran

## 2025-02-23 NOTE — PROGRESS NOTE ADULT - PROBLEM SELECTOR PLAN 3
-Patient getting ceftriaxone at [facility] for PNA    Plan:  -CXR ordered  -Start CTX and azithromycin  -F/u blood cultures, urine cultures, urine strep, urine legionella -Patient getting ceftriaxone at [facility] for PNA    Plan:  -CXR ordered  -Start CTX and azithromycin (2/21 - )   -F/u blood cultures, urine cultures, urine strep, urine legionella --> NGTD as of 2/23

## 2025-02-23 NOTE — PROGRESS NOTE ADULT - PROBLEM SELECTOR PLAN 7
-Medications from recent stay at [facility] added  -Per son, patient wakes up a little confused but is baseline AO X3    Plan:  -updated medication reconciliation with info from son, who administers her meds at home

## 2025-02-23 NOTE — SWALLOW BEDSIDE ASSESSMENT ADULT - COMMENTS
Hx continued: New CT shows metastasis in the lung/liver and adrenal glands.    ***Pt is not previously known to this service and no prior swallow studies in PACS. Hx continued: New CT shows metastasis in the lung/liver and adrenal glands. No c/f ileus.     ***Pt is not previously known to this service and no prior swallow studies in PACS. Hx continued: New CT shows metastasis in the lung/liver and adrenal glands. No c/f ileus or esophageal mass.     ***Pt is not previously known to this service and no prior swallow studies in PACS.

## 2025-02-23 NOTE — PROGRESS NOTE ADULT - ATTENDING COMMENTS
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 Additionally with UTI, PNA, and concern for ileus.     #Ileus. - CT imaging done at [place], CD in chart; no evidence of ileus. Patient with benign abdominal exam. Repeat CT here shows metastases in the lung/liver and adrenal glands. Pt on ? Lomotil- hold for now.     #Colon cancer. -S/p abdominal colectomy w/ end ileostomy-Scans from [place] showing progression of metastases to liver and lung- heme onc to discuss on Monday    #PNA #UTI- c/w CTX and Azithro (will tx full course, interupted abx reported at [facility])- F/u blood cultures, urine cultures, urine strep, urine legionella.    #GOC- palliative on board- pt will think about code status, wants definitive answer on cancer progression and prognosis first

## 2025-02-23 NOTE — PROGRESS NOTE ADULT - SUBJECTIVE AND OBJECTIVE BOX
Yvonne Bradford MD  PGY2  Preferred contact via Microsoft Teams      Patient is a 89y old  Female who presents with a chief complaint of Pneumonia due to infectious organism     (22 Feb 2025 08:57)      SUBJECTIVE / OVERNIGHT EVENTS: No overnight events    MEDICATIONS  (STANDING):  azithromycin  IVPB 500 milliGRAM(s) IV Intermittent every 24 hours  cefTRIAXone   IVPB 1000 milliGRAM(s) IV Intermittent every 24 hours  cholestyramine Powder (Sugar-Free) 4 Gram(s) Oral two times a day  dextrose 5% + lactated ringers. 1000 milliLiter(s) (75 mL/Hr) IV Continuous <Continuous>  diphenoxylate/atropine 1 Tablet(s) Oral two times a day  enoxaparin Injectable 40 milliGRAM(s) SubCutaneous every 24 hours  escitalopram 10 milliGRAM(s) Oral daily  levothyroxine 75 MICROGram(s) Oral daily    MEDICATIONS  (PRN):  simethicone 80 milliGRAM(s) Chew two times a day PRN Gas      CAPILLARY BLOOD GLUCOSE        I&O's Summary    22 Feb 2025 07:01  -  23 Feb 2025 07:00  --------------------------------------------------------  IN: 1000 mL / OUT: 250 mL / NET: 750 mL        PHYSICAL EXAM:    VITALS:   T(C): 36.3 (02-23-25 @ 05:54), Max: 36.5 (02-22-25 @ 11:34)  HR: 90 (02-23-25 @ 05:54) (90 - 103)  BP: 103/62 (02-23-25 @ 05:54) (103/62 - 113/67)  RR: 18 (02-23-25 @ 05:54) (18 - 18)  SpO2: 94% (02-23-25 @ 05:54) (94% - 94%)    GENERAL: NAD, cachectic   HEAD:  Atraumatic, normocephalic  EYES: EOMI, PERRLA, conjunctiva and sclera clear  ENT: Moist mucous membranes  NECK: Supple, no JVD  HEART: Regular rate and rhythm, no murmurs, rubs, or gallops  LUNGS: Unlabored respirations.  Clear to auscultation bilaterally, no crackles, wheezing, or rhonchi  ABDOMEN: Soft, nontender, nondistended, +BS, ostomy with brown stool   EXTREMITIES: 2+ peripheral pulses bilaterally. No clubbing, cyanosis, or edema  NERVOUS SYSTEM:  A&Ox3, no focal deficits   SKIN: No rashes or lesions      LABS:                        10.6   9.92  )-----------( 320      ( 22 Feb 2025 07:13 )             33.3      02-22    139  |  103  |  12  ----------------------------<  186[H]  3.5   |  24  |  0.72    Ca    8.5      22 Feb 2025 07:11  Phos  2.5     02-22  Mg     1.4     02-22    TPro  5.4[L]  /  Alb  2.5[L]  /  TBili  0.6  /  DBili  x   /  AST  27  /  ALT  12  /  AlkPhos  80  02-22          Urinalysis Basic - ( 22 Feb 2025 07:11 )    Color: x / Appearance: x / SG: x / pH: x  Gluc: 186 mg/dL / Ketone: x  / Bili: x / Urobili: x   Blood: x / Protein: x / Nitrite: x   Leuk Esterase: x / RBC: x / WBC x   Sq Epi: x / Non Sq Epi: x / Bacteria: x        RADIOLOGY & ADDITIONAL TESTS:    Imaging Personally Reviewed:    Consultant(s) Notes Reviewed:      Care Discussed with Consultants/Other Providers:   Yvonne Bradford MD  PGY2  Preferred contact via Microsoft Teams      Patient is a 89y old  Female who presents with a chief complaint of Pneumonia due to infectious organism     (22 Feb 2025 08:57)      SUBJECTIVE / OVERNIGHT EVENTS: No overnight events. Pt examined at bedside this AM. Resting comfortably.     MEDICATIONS  (STANDING):  azithromycin  IVPB 500 milliGRAM(s) IV Intermittent every 24 hours  cefTRIAXone   IVPB 1000 milliGRAM(s) IV Intermittent every 24 hours  cholestyramine Powder (Sugar-Free) 4 Gram(s) Oral two times a day  dextrose 5% + lactated ringers. 1000 milliLiter(s) (75 mL/Hr) IV Continuous <Continuous>  diphenoxylate/atropine 1 Tablet(s) Oral two times a day  enoxaparin Injectable 40 milliGRAM(s) SubCutaneous every 24 hours  escitalopram 10 milliGRAM(s) Oral daily  levothyroxine 75 MICROGram(s) Oral daily    MEDICATIONS  (PRN):  simethicone 80 milliGRAM(s) Chew two times a day PRN Gas      CAPILLARY BLOOD GLUCOSE      I&O's Summary    22 Feb 2025 07:01  -  23 Feb 2025 07:00  --------------------------------------------------------  IN: 1000 mL / OUT: 250 mL / NET: 750 mL      PHYSICAL EXAM:    VITALS:   T(C): 36.3 (02-23-25 @ 05:54), Max: 36.5 (02-22-25 @ 11:34)  HR: 90 (02-23-25 @ 05:54) (90 - 103)  BP: 103/62 (02-23-25 @ 05:54) (103/62 - 113/67)  RR: 18 (02-23-25 @ 05:54) (18 - 18)  SpO2: 94% (02-23-25 @ 05:54) (94% - 94%)    GENERAL: NAD, cachectic   HEAD:  Atraumatic, normocephalic  EYES: EOMI, PERRLA, conjunctiva and sclera clear  ENT: Moist mucous membranes  NECK: Supple, no JVD  HEART: Regular rate and rhythm, no murmurs, rubs, or gallops  LUNGS: Unlabored respirations.  Clear to auscultation bilaterally, no crackles, wheezing, or rhonchi  ABDOMEN: Soft, nontender, nondistended, +BS, ostomy with brown stool   EXTREMITIES: 2+ peripheral pulses bilaterally. No clubbing, cyanosis, or edema  NERVOUS SYSTEM:  A&Ox2  SKIN: No rashes or lesions      LABS:                        10.6   9.92  )-----------( 320      ( 22 Feb 2025 07:13 )             33.3      02-22    139  |  103  |  12  ----------------------------<  186[H]  3.5   |  24  |  0.72    Ca    8.5      22 Feb 2025 07:11  Phos  2.5     02-22  Mg     1.4     02-22    TPro  5.4[L]  /  Alb  2.5[L]  /  TBili  0.6  /  DBili  x   /  AST  27  /  ALT  12  /  AlkPhos  80  02-22          Urinalysis Basic - ( 22 Feb 2025 07:11 )    Color: x / Appearance: x / SG: x / pH: x  Gluc: 186 mg/dL / Ketone: x  / Bili: x / Urobili: x   Blood: x / Protein: x / Nitrite: x   Leuk Esterase: x / RBC: x / WBC x   Sq Epi: x / Non Sq Epi: x / Bacteria: x        RADIOLOGY & ADDITIONAL TESTS:    < from: CT Abdomen and Pelvis w/ IV Cont (02.21.25 @ 22:44) >  IMPRESSION:  Status post colectomy with right lower quadrant ileostomy.    Diffuse pulmonary and hepatic metastases.    Thickened and heterogeneous adrenal glands suspicious for metastatic   disease.    Intra-abdominal lymphadenopathy.    Narrowed portal splenic confluence due to an enlarged and infiltrated   left hepatic lobe.    Small bilateral pleural effusions, increased since 2/17/2025.    < end of copied text >   Yvonne Bradford MD  PGY2  Preferred contact via Microsoft Teams      Patient is a 89y old  Female who presents with a chief complaint of Pneumonia due to infectious organism     (22 Feb 2025 08:57)      SUBJECTIVE / OVERNIGHT EVENTS: No overnight events. Pt examined at bedside this AM. Resting comfortably, opens eyes to voice.     MEDICATIONS  (STANDING):  azithromycin  IVPB 500 milliGRAM(s) IV Intermittent every 24 hours  cefTRIAXone   IVPB 1000 milliGRAM(s) IV Intermittent every 24 hours  cholestyramine Powder (Sugar-Free) 4 Gram(s) Oral two times a day  dextrose 5% + lactated ringers. 1000 milliLiter(s) (75 mL/Hr) IV Continuous <Continuous>  diphenoxylate/atropine 1 Tablet(s) Oral two times a day  enoxaparin Injectable 40 milliGRAM(s) SubCutaneous every 24 hours  escitalopram 10 milliGRAM(s) Oral daily  levothyroxine 75 MICROGram(s) Oral daily    MEDICATIONS  (PRN):  simethicone 80 milliGRAM(s) Chew two times a day PRN Gas      CAPILLARY BLOOD GLUCOSE      I&O's Summary    22 Feb 2025 07:01  -  23 Feb 2025 07:00  --------------------------------------------------------  IN: 1000 mL / OUT: 250 mL / NET: 750 mL      PHYSICAL EXAM:    VITALS:   T(C): 36.3 (02-23-25 @ 05:54), Max: 36.5 (02-22-25 @ 11:34)  HR: 90 (02-23-25 @ 05:54) (90 - 103)  BP: 103/62 (02-23-25 @ 05:54) (103/62 - 113/67)  RR: 18 (02-23-25 @ 05:54) (18 - 18)  SpO2: 94% (02-23-25 @ 05:54) (94% - 94%)    GENERAL: NAD, cachectic, opens eyes to voice   HEAD:  Atraumatic, normocephalic  EYES: EOMI, PERRLA, conjunctiva and sclera clear  ENT: Moist mucous membranes  NECK: Supple, no JVD  HEART: Regular rate and rhythm, no murmurs, rubs, or gallops  LUNGS: Unlabored respirations.  Clear to auscultation bilaterally, no crackles, wheezing, or rhonchi  ABDOMEN: Soft, nontender, nondistended, +BS, ostomy with brown stool   EXTREMITIES: 2+ peripheral pulses bilaterally. No clubbing, cyanosis, or edema  NERVOUS SYSTEM:  A&Ox2  SKIN: No rashes or lesions      LABS:                        10.6   9.92  )-----------( 320      ( 22 Feb 2025 07:13 )             33.3      02-22    139  |  103  |  12  ----------------------------<  186[H]  3.5   |  24  |  0.72    Ca    8.5      22 Feb 2025 07:11  Phos  2.5     02-22  Mg     1.4     02-22    TPro  5.4[L]  /  Alb  2.5[L]  /  TBili  0.6  /  DBili  x   /  AST  27  /  ALT  12  /  AlkPhos  80  02-22          Urinalysis Basic - ( 22 Feb 2025 07:11 )    Color: x / Appearance: x / SG: x / pH: x  Gluc: 186 mg/dL / Ketone: x  / Bili: x / Urobili: x   Blood: x / Protein: x / Nitrite: x   Leuk Esterase: x / RBC: x / WBC x   Sq Epi: x / Non Sq Epi: x / Bacteria: x        RADIOLOGY & ADDITIONAL TESTS:    < from: CT Abdomen and Pelvis w/ IV Cont (02.21.25 @ 22:44) >  IMPRESSION:  Status post colectomy with right lower quadrant ileostomy.    Diffuse pulmonary and hepatic metastases.    Thickened and heterogeneous adrenal glands suspicious for metastatic   disease.    Intra-abdominal lymphadenopathy.    Narrowed portal splenic confluence due to an enlarged and infiltrated   left hepatic lobe.    Small bilateral pleural effusions, increased since 2/17/2025.    < end of copied text >

## 2025-02-24 LAB
ADD ON TEST-SPECIMEN IN LAB: SIGNIFICANT CHANGE UP
FERRITIN SERPL-MCNC: 694 NG/ML — HIGH (ref 13–330)
HAPTOGLOB SERPL-MCNC: 250 MG/DL — HIGH (ref 34–200)
IRON SATN MFR SERPL: 22 % — SIGNIFICANT CHANGE UP (ref 14–50)
IRON SATN MFR SERPL: 28 UG/DL — LOW (ref 30–160)
LDH SERPL L TO P-CCNC: 455 U/L — HIGH (ref 50–242)
S PNEUM AG UR QL: NEGATIVE — SIGNIFICANT CHANGE UP
TIBC SERPL-MCNC: 128 UG/DL — LOW (ref 220–430)
UIBC SERPL-MCNC: 99 UG/DL — LOW (ref 110–370)

## 2025-02-24 PROCEDURE — 99232 SBSQ HOSP IP/OBS MODERATE 35: CPT | Mod: GC

## 2025-02-24 RX ORDER — SODIUM CHLORIDE 9 G/1000ML
1000 INJECTION, SOLUTION INTRAVENOUS
Refills: 0 | Status: DISCONTINUED | OUTPATIENT
Start: 2025-02-24 | End: 2025-02-24

## 2025-02-24 RX ADMIN — Medication 75 MICROGRAM(S): at 04:45

## 2025-02-24 RX ADMIN — ESCITALOPRAM OXALATE 10 MILLIGRAM(S): 20 TABLET ORAL at 12:41

## 2025-02-24 RX ADMIN — Medication 250 MILLIGRAM(S): at 03:38

## 2025-02-24 RX ADMIN — Medication 4 GRAM(S): at 09:34

## 2025-02-24 RX ADMIN — ENOXAPARIN SODIUM 40 MILLIGRAM(S): 100 INJECTION SUBCUTANEOUS at 04:45

## 2025-02-24 RX ADMIN — Medication 4 GRAM(S): at 20:28

## 2025-02-24 RX ADMIN — CEFTRIAXONE 100 MILLIGRAM(S): 500 INJECTION, POWDER, FOR SOLUTION INTRAMUSCULAR; INTRAVENOUS at 03:12

## 2025-02-24 NOTE — PROGRESS NOTE ADULT - SUBJECTIVE AND OBJECTIVE BOX
---------------------------  Pooja Peralta, PGY2    Internal Medicine    Available on Teams   ---------------------------      PATIENT:  TRACEE MARCIAL  [id number]    CHIEF COMPLAINT:  Patient is a 89y old  Female who presents with a chief complaint of Progression of colon CA (23 Feb 2025 07:30)      INTERVAL HISTORY/OVERNIGHT EVENTS:  No acute events overnight. Patient seen and examined at bedside this AM. Pt remains afebrile and hemodynamically stable. Patient reports feeling well. Denies fever, chills, chest pain, dyspnea, abdominal pain, nausea, diarrhea, constipation, dysuria, or other acute symptoms.    ROS otherwise negative except as indicated above.    MEDICATIONS:  MEDICATIONS  (STANDING):  azithromycin  IVPB 500 milliGRAM(s) IV Intermittent every 24 hours  cefTRIAXone   IVPB 1000 milliGRAM(s) IV Intermittent every 24 hours  cholestyramine Powder (Sugar-Free) 4 Gram(s) Oral two times a day  dextrose 5% + lactated ringers. 1000 milliLiter(s) (50 mL/Hr) IV Continuous <Continuous>  enoxaparin Injectable 40 milliGRAM(s) SubCutaneous every 24 hours  escitalopram 10 milliGRAM(s) Oral daily  levothyroxine 75 MICROGram(s) Oral daily    MEDICATIONS  (PRN):  simethicone 80 milliGRAM(s) Chew two times a day PRN Gas      ALLERGIES:  Allergies    nebivolol (Other)    Intolerances        OBJECTIVE:  ICU Vital Signs Last 24 Hrs  T(C): 36.4 (24 Feb 2025 03:59), Max: 36.9 (23 Feb 2025 20:25)  T(F): 97.5 (24 Feb 2025 03:59), Max: 98.4 (23 Feb 2025 20:25)  HR: 85 (24 Feb 2025 03:59) (78 - 91)  BP: 117/68 (24 Feb 2025 03:59) (102/66 - 117/68)  BP(mean): --  ABP: --  ABP(mean): --  RR: 18 (24 Feb 2025 03:59) (18 - 18)  SpO2: 94% (24 Feb 2025 03:59) (94% - 96%)    O2 Parameters below as of 24 Feb 2025 03:59  Patient On (Oxygen Delivery Method): room air            CAPILLARY BLOOD GLUCOSE        CAPILLARY BLOOD GLUCOSE        I&O's Summary    23 Feb 2025 07:01  -  24 Feb 2025 07:00  --------------------------------------------------------  IN: 460 mL / OUT: 100 mL / NET: 360 mL      Daily     Daily     PHYSICAL EXAMINATION:  CONSTITUTIONAL: NAD; cachectic  HEENT: Sclera clear; tracking eye movements  RESPIRATORY: Normal respiratory effort; lungs are clear to auscultation bilaterally; No crackles/rhonchi/wheezing  CARDIOVASCULAR: Regular rate and rhythm, normal S1 and S2, no murmur/rub/gallop; No lower extremity edema  ABDOMEN: Minimal RUQ ttp, no rebound/guarding; Not distended  NEURO: A&Ox3; no focal deficits                         RADIOLOGY & ADDITIONAL TESTS: Reviewed.

## 2025-02-24 NOTE — PHYSICAL THERAPY INITIAL EVALUATION ADULT - ADDITIONAL COMMENTS
Pt is a poor historian; spoke with Son on phone. Pt resides in a private house with son. Pt has 9 stairs to enter (son reports himself and brother will carry her). Pt ambulates with a rolling walker at baseline and owns cane and wheelchair. Pt has [facility] 7 days a week for 5 hours.

## 2025-02-24 NOTE — PROGRESS NOTE ADULT - PROBLEM SELECTOR PLAN 8
-DVT: Lovenox  -Diet: Soft and bite sized, pending further S+S eval  -GOC: Full code pending further assessment by oncology team

## 2025-02-24 NOTE — SWALLOW BEDSIDE ASSESSMENT ADULT - SLP PERTINENT HISTORY OF CURRENT PROBLEM
89y F w/ PMHx of HTN, hypothyroidism, OP, severe MR and TR, kidney stones, SDH s/p craniectomy, colon CA s/p abdominal colectomy w/ end ileostomy, and SBO who initially presented to to [facility] with cough and post-tussive emesis. She was found to have a UTI and community acquired PNA; started on ceftriaxone. CT imaging at OSH showed progression of metastases to liver and lung and c/f ileus. Patient/family requesting transfer to [facility] for further management given that Dr. Rama Jane is her oncologist. Dr. Jane accepted the transfer and patient admitted to medicine service for further management. Per heme/onc note, "She now presents as transfer from [facility] w/ suspected liver, lung, and potential esophageal lesion on imaging w/ a month of dysphagia, nausea, and poor PO intake...if patient w/ esophageal mass as cause of nausea would recommend GI evaluation. Otherwise if no distinct mass consider speech and swallow evaluation."
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 She was found to have a UTI and community acquired PNA; started on ceftriaxone. CT imaging at OSH showed progression of metastases to liver and lung and c/f ileus. Patient/family requesting transfer to [facility] for further management given that Dr. Rama Jane is her oncologist. Dr. Jane accepted the transfer and patient admitted to medicine service for further management. Per heme/onc note, "She now presents as transfer from [facility] w/ suspected liver, lung, and potential esophageal lesion on imaging w/ a month of dysphagia, nausea, and poor PO intake...if patient w/ esophageal mass as cause of nausea would recommend GI evaluation. Otherwise if no distinct mass consider speech and swallow evaluation."

## 2025-02-24 NOTE — PHYSICAL THERAPY INITIAL EVALUATION ADULT - NSPTDISCHREC_GEN_A_CORE
NATALIA; If pt d/c home, pt would require Home PT and assist/supervision with ALL functional mobility and ADLs./Sub-acute Rehab

## 2025-02-24 NOTE — PROGRESS NOTE ADULT - ASSESSMENT
89 y/o woman presenting to OSH found to have PNA and UTI now transferred to [facility] for continuation of care and oncology care continuity.     Primary Oncologist: Dr. Rama Jane  Meds: Enoxaparin, Azithro, Ceftriaxone, Escitalopram, Levothyroxine.   PMHx: HTN, HLD, Hypothyroid, SDH s/p craniotomy, SBO.   Brief Onc History:   -Hospitalized at [facility] from 3/20- 3/28/24 for SBO. 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   -Colonoscopy on 3/26/24: Found likely malignant tumor in the sigmoid colon, diverticulosis in the descending and sigmoid colon, and likely malignant partially obstructing tumor at the ileocecal valve, biopsied  -5/20/24, she underwent lap-assisted total abdominal colectomy with end ileostomy for partially obstructing cecal mass and peritoneal colon mass by Dr. Chung  -Path from resection as follows: Peritoneal implant- benign. 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  Path stage [id number]. 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 MMR: MLH1, MSH2, MSH6 and PMS2 all intact. Final path R sided Stage III colorectal cancer (cecal, ileocecal valve).    -Last seen by Dr. Jane in July 2024, conversation at the time involved post-operative report/path showing Stage III disease, high risk, conversation about adjuvant systemic treatment at the time was had, through shared decision making given patient's frail performance status and desire not to get systemic treatment, patient did not receive adjuvant treatment w/ plan to possibly image/f/u w/ Dr. Jane for imaging q3 months. No f/u imaging seen in the EMR since that appointment however.    Recommendations:   -Reviewed repeat CT A/P w/ IV contrast from this hospitalization w/ family. Patient is hospice appropriate in the setting of advanced metastatic disease and frailty. D/w patient and patient's HCP ([facility]) and family members who are in agreement w/ hospice referral.   -Infectious management per primary team    Oncology to follow with you.     Plan to be d/w Dr. Jane.      Germán Damon M.D.  H/O PGY-5

## 2025-02-24 NOTE — SWALLOW BEDSIDE ASSESSMENT ADULT - PHARYNGEAL PHASE
No overt s/s of laryngeal penetration/aspiration/Within functional limits
No overt s/s of laryngeal penetration/aspiration/Within functional limits

## 2025-02-24 NOTE — PHYSICAL THERAPY INITIAL EVALUATION ADULT - PERTINENT HX OF CURRENT PROBLEM, REHAB EVAL
Pt is a 89 y/o F w/ PMH HTN. hypothyroidism, osteoporosis, severe MR and TR, kidney stones, SDH s/p craniectomy (2020), colon cancer (stage III), s/p abdominal colectomy w/ end ileostomy (5/2024) c/b hypotension and SICU stay who initially presented to [facility] with anorexia/weakness was found to have a UTI and PNA, transferred to [facility] for management of possible cancer progression.  CXR: Patchy opacity overlying the right lung base likely representing atelectasis.  Xray ABD: Status post colectomy with right lower quadrant ileostomy. Diffuse pulmonary and hepatic metastases.  Thickened and heterogeneous adrenal glands suspicious for metastatic disease. Intra-abdominal lymphadenopathy. Narrowed portal splenic confluence due to an enlarged and infiltrated left hepatic lobe.

## 2025-02-24 NOTE — PROGRESS NOTE ADULT - ATTENDING COMMENTS
89 F w/ h/o Stage III colon cancer s/p resection in 2024, did not receive adjuvant therapy now transferred from OS for further evaluation of advance metastatic cancer. Imaging reviewed and discussed with pt and family. There are innumerable liver and pulmonary mets as well as other findings suggesting widespread disease. Pt with ECOG 3-4, she is currently confused and does not have capacity for decision making. Discussed with pt's family (including HCP daughter) outside the room. Reviewed advanced disease that is not curable. Pt's poor ECOG precludes from any DMT. Discussed hospice services at home. Family in agreement with referral. In agreement to not pursue a biopsy. Emotional support provided.     Recommendation   - pls refer pt to hospice

## 2025-02-24 NOTE — PROGRESS NOTE ADULT - PROBLEM SELECTOR PLAN 2
Patient had been experiencing difficulty swallowing which led to anorexia prior to admission. At [facility] they noted a mass in her esophagus.    PLAN:  -bedside dysphagia screen  -S and S consulted, attempted eval on 2/23 but would not accept food and says she has no appetite; will reeval on 2/24

## 2025-02-24 NOTE — SWALLOW BEDSIDE ASSESSMENT ADULT - SLP GENERAL OBSERVATIONS
[facility]  [address], [place] [address]  Phone   Fax       Jose Lynch  1980  40 y.o.  male      PCP Princess Kennedy APRN    Type of service: Initial consult  Date of service: 9/17/2020      Chief Complaint   Patient presents with   • Witnessed Apnea   • Snoring   • Fatigue       History of present illness;  Thank you for asking to see Jose Lynch, 40 y.o.  for evaluation of sleep apnea. The patient was seen today on 9/17/2020 at [facility].   He was sent for evaluation of sleep apnea because of snoring and also he needs a physical for DOT.  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    Patient gives the following sleep history.  Sleep schedule:  Bedtime: 9 PM  Wake time: 4 AM  Normally takes about 15-20 minutes to fall asleep  Average hours of sleep 6-7  Number of naps per day no  When patient wakes up still feels tired and not rested  Snoring; yes  Witnessed apneas; yes  Have you ever awakened gasping for breath, coughing, choking: No      Past Medical History:   Diagnosis Date   • Benign hypertension        Social history:  Shift work: No  Tobacco use: No  Alcohol use: No  Caffeinated drinks: 3  Over-the-counter sleeping aid and medications: No  Narcotic medications: No    Family Hx  Family history of sleep apnea, negative  Family History   Problem Relation Age of Onset   • Diabetes Mother    • Diabetes Father    • Heart disease Father    • Heart attack Maternal Grandmother    • Heart attack Maternal Grandfather    • Heart attack Paternal Grandmother    • Heart attack Paternal Grandfather        Medications: reviewed    Current Outpatient Medications:   •  lisinopril (PRINIVIL,ZESTRIL) 20 MG tablet, Take 1 tablet by mouth Daily., Disp: 90 tablet, Rfl: 0    Review of systems:  [place] Sleepiness Scale: Total score: 2   Positive for snoring, 
"witnessed apneas, fatigue   Negative for shortness of breath, cough, wheezing, chest pain, nausea and vomiting, palpitation, swelling of feet:    Morning headaches: No  Awaken with sore throat or dry mouth : Yes  Leg jerking at night: No  Crawly feeling/urge sensation to move in the legs: No  Teeth grinding: No  Sleepwalking, nightmares, muscle weakness with laughing or anger,sleep paralysis: No  Nasal Congestion: No  Nocturia (how many times/night): None  Memory Problem: No  Change in weight, he has gained about 30 pounds    Physical exam:  Vitals:    09/17/20 1300   BP: 144/79   Pulse: 86   SpO2: 99%   Weight: (!) 149 kg (327 lb 12.8 oz)   Height: 182.9 cm (72\")    Body mass index is 44.46 kg/m². Neck Circumference: 21 inches  HEENT: Head is atraumatic, normocephalic   Eyes:pupils are round equal and reacting to light and accommodation, conjunctiva normal  Nose:no nasal septal defects or deviation and the nasal passages are clear, no nasal polyps,   Throat: tonsils are not enlarged, tongue normal size, oral airway Mallampati class 2  NECK: No lymphadenopathy, trachea is in the midline, thyroid not enlarged  RESPIRATORY SYSTEM: Breath sounds are equal on both sides, there are no wheezes or crackles  CARDIOVASULAR SYSTEM: Heart sounds are regular rhythm and marly rate, there are no murmurs or thrills  ABDOMEN: Soft, no hepatosplenomegaly, no evidence of ascites  EXTREMITES: No cyanosis, clubbing or edema   NEUROLOGICAL SYSTEM: Oriented x 3, no gross neurological defects, gait normal    Medical records and labs reviewed in [facility] reviewed    Assessment and plan:  · Sleep apnea unspecified, (G47.30) Patient's symptoms and examination is consistent with sleep apnea.  I have talked to the patient about the signs and symptoms of sleep apnea and consequences of untreated sleep apnea. Discussed sleep testing.  I have placed a order in epic for a home sleep test.  Patient will have a follow-up after this sleep test is done. "
  · Obesity, patient's BMI is Body mass index is 44.46 kg/m².. I have discussed the relationship between weight and sleep apnea.There is direct correction between weight and severity of sleep apnea.  Weight reduction is encouraged. I have also discussed with the patient diet and exercise.  Unfortunately he has gained about 30 pounds.  I talked to him about weight reduction and health benefits  · Snoring secondary to sleep apnea  · Hypertension.  Essential hypertension is highly correlated with sleep apnea. Treating sleep apnea will assist in good blood pressure control.      I once again thank you for asking me to see this patient in consultation and I have forwarded my opinion and treatment plan.  Please do not hesitate to call me if you have any questions.     Eugenia Herron MD  Sleep Medicine.(Board-certified)  [facility]  Medical Director for [facility]  9/17/2020 ,    
Encountered Pt asleep in bed on RA; Pt easily aroused to verbal stimuli. Pt is A&Ox1 w/ confusion, verbally responsive, and inconsistently follows simple commands w/ verbal/visual cues.
Encountered Pt asleep in bed on RA; easily aroused to verbal stimuli. Pt is A&Ox2 w/ confusion, verbally responsive, and able to follow simple commands--mild improvement in mentation noted from encounter 2/23.

## 2025-02-24 NOTE — PROGRESS NOTE ADULT - PROBLEM SELECTOR PLAN 5
-Patient taking nebivolol at home but stopped at [facility]    Plan:  -Patient normotensive. Will continue to hold nebivolol

## 2025-02-24 NOTE — PROGRESS NOTE ADULT - ASSESSMENT
89 y/o F w/ PMH HTN. 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

## 2025-02-24 NOTE — PROGRESS NOTE ADULT - PROBLEM SELECTOR PLAN 7
-Medications from recent stay at [facility] added  -Per son, patient wakes up a little confused but is baseline AO X3    Plan:  -updated medication reconciliation with info from son, who administers her meds at home

## 2025-02-24 NOTE — SWALLOW BEDSIDE ASSESSMENT ADULT - ORAL PHASE
Please follow-up with your primary care provider and/or specialist regarding your visit to the ER today.    Please return to the emergency department should you experience any of the symptoms we specifically discussed, including but not limited to recurrence or worsening of your symptoms, or development of any new and concerning symptoms.   Within functional limits Delayed oral transit time

## 2025-02-24 NOTE — SWALLOW BEDSIDE ASSESSMENT ADULT - COMMENTS
Pt denies globus sensation or dysphagia Hx continued: New CT shows metastasis in the lung/liver and adrenal glands. No c/f ileus or esophageal mass. Palliative consult/GOC conversation pending Pt/family d/w Heme/Onc.     ***Pt is not previously known to this service and no prior swallow studies in PACS. ON THIS ADMISSION: Pt seen for initial swallow evaluation 2/23, however, Pt w/ poor participation/acceptance of PO trials. Diet subsequently deferred to medical team w/ plan for SLP to re-attempt at a later date.

## 2025-02-24 NOTE — PHYSICAL THERAPY INITIAL EVALUATION ADULT - GENERAL OBSERVATIONS, REHAB EVAL
Pt received supine in bed, A&0x0, pleasantly confused, following 50% simple step commands. Pt presented to [facility] with anorexia/weakness was found to have a UTI and PNA, transferred to [facility] for management of possible cancer progression.

## 2025-02-24 NOTE — PROGRESS NOTE ADULT - PROBLEM SELECTOR PLAN 3
-Patient getting ceftriaxone at [facility] for PNA    Plan:  -Start CTX and azithromycin (2/21 - 2/25)  -F/u blood cultures, urine cultures, urine strep, urine legionella --> NGTD as of 2/23

## 2025-02-24 NOTE — SWALLOW BEDSIDE ASSESSMENT ADULT - SWALLOW EVAL: DIAGNOSIS
89y F who presents as a transfer for CAP, UTI, and metastasis in the lung/liver and adrenal glands. Pt presents w/ an oral dysphagia characterized by prolonged mastication and AP transit time w/ regular solids. Improvement in oral phase noted w/ easy-to-chew solids. No overt s/s of laryngeal penetration/aspiration noted across PO trials. Upon palpation, hyolaryngeal excursion and onset of pharyngeal swallow are WNL. Pt denies difficulty swallowing, however, endorses poor appetite. This service will follow-up to monitor diet tolerance.
89y F who presents as a transfer for CAP, UTI, and metastasis in the lung/liver and adrenal glands. This exam is limited 2/2 Pt participation. Pt accepted x1 PO trial of thin liquids via straw and declined all solid trials. Oral and pharyngeal phases appear to be WNL w/ no overt s/s of laryngeal penetration/aspiration noted w/ limited trials. Defer diet to medical team at this time. This service will continue to follow Pt and re-assess as medically appropriate. Team is aware and in agreement w/ current plan.

## 2025-02-24 NOTE — SWALLOW BEDSIDE ASSESSMENT ADULT - SWALLOW EVAL: BUCCAL STRENGTH AND MOBILITY
Unable to assess 2/2 Pt participation/comprehension
Unable to assess 2/2 Pt participation/comprehension

## 2025-02-24 NOTE — SWALLOW BEDSIDE ASSESSMENT ADULT - SWALLOW EVAL: CRITERIA FOR SKILLED INTERVENTION MET
This service will follow-up to monitor diet tolerance x1
This service will continue to follow Pt and re-assess as medically appropriate

## 2025-02-24 NOTE — PROGRESS NOTE ADULT - ATTENDING COMMENTS
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 Additionally with UTI, PNA, and concern for ileus.     #Ileus. - CT imaging done at [facility], CD in chart; no evidence of ileus. Patient with benign abdominal exam. Repeat CT here shows metastases in the lung/liver and adrenal glands. Pt on ? Lomotil- hold for now.     #Colon cancer. -S/p abdominal colectomy w/ end ileostomy-Scans from [place] showing progression of metastases to liver and lung- heme onc to discuss on today from 3-5 pm     #PNA #UTI- c/w CTX and Azithro (will tx full course, interupted abx reported at [facility])- F/u blood cultures, urine cultures, urine strep, urine legionella.    #GOC- palliative on board- pt will think about code status, wants definitive answer on cancer progression and prognosis first

## 2025-02-24 NOTE — SWALLOW BEDSIDE ASSESSMENT ADULT - ASR SWALLOW LABIAL MOBILITY
LVM to daughter modesto to please call back to office to go over medication.  
within functional limits
within functional limits

## 2025-02-24 NOTE — PROGRESS NOTE ADULT - PROBLEM SELECTOR PLAN 4
-Patient diagnosed with UTI at [facility]  -Treatment unclear    Plan:  -C/w ceftriaxone for 5 day course

## 2025-02-24 NOTE — CHART NOTE - NSCHARTNOTEFT_GEN_A_CORE
Patient seen this morning; she appears comfortable without evidence of acute distress due to pain. The patient reports that he has not been having oral nutrition because he feels that food is not going down. She denies nausea or vomiting. I will follow for ongoing goals-of-care discussion, heme/onc discussion regarding imaging findings is still pending.     Liseth Jarrett MD   Geriatrics and Palliative Care Attending   [facility]     In the event of newly developing, evolving, or worsening symptoms, please contact the Palliative Medicine team via pager (if the patient is at [facility] #[phone] or if the patient is at [facility] #[phone]) The Geriatric and Palliative Medicine service has coverage 24 hours a day/ 7 days a week to provide medical recommendations regarding symptom management needs via telephone.

## 2025-02-24 NOTE — SWALLOW BEDSIDE ASSESSMENT ADULT - SWALLOW EVAL: PATIENT/FAMILY GOALS STATEMENT
Pt reports "I don't want to eat, I have no appetite"
Pt reports poor appetite; primarily accepting liquids per RN Liz

## 2025-02-24 NOTE — SWALLOW BEDSIDE ASSESSMENT ADULT - SLP PRECAUTIONS/LIMITATIONS: VISION
Partially impaired: cannot see medication labels or newsprint, but can see obstacles in path, and the surrounding layout; can count fingers at arm's length/impaired
Partially impaired: cannot see medication labels or newsprint, but can see obstacles in path, and the surrounding layout; can count fingers at arm's length/impaired

## 2025-02-24 NOTE — PROGRESS NOTE ADULT - SUBJECTIVE AND OBJECTIVE BOX
Patient is a 89y old  Female who presents with a chief complaint of Progression of colon CA (24 Feb 2025 08:16)       Pt is seen and examined  pt is in bed, tolerating some food  Not having much pain  Denies nausea or vomiting   No fevers      MEDICATIONS  (STANDING):  cefTRIAXone   IVPB 1000 milliGRAM(s) IV Intermittent every 24 hours  cholestyramine Powder (Sugar-Free) 4 Gram(s) Oral two times a day  enoxaparin Injectable 40 milliGRAM(s) SubCutaneous every 24 hours  escitalopram 10 milliGRAM(s) Oral daily  levothyroxine 75 MICROGram(s) Oral daily    MEDICATIONS  (PRN):  simethicone 80 milliGRAM(s) Chew two times a day PRN Gas      Allergies    nebivolol (Other)    Intolerances        Vital Signs Last 24 Hrs  T(C): 36.3 (24 Feb 2025 11:16), Max: 36.9 (23 Feb 2025 20:25)  T(F): 97.3 (24 Feb 2025 11:16), Max: 98.4 (23 Feb 2025 20:25)  HR: 89 (24 Feb 2025 11:16) (78 - 92)  BP: 91/59 (24 Feb 2025 11:16) (91/59 - 117/68)  BP(mean): --  RR: 18 (24 Feb 2025 11:16) (18 - 18)  SpO2: 94% (24 Feb 2025 11:16) (92% - 96%)    Parameters below as of 24 Feb 2025 08:55  Patient On (Oxygen Delivery Method): room air        PHYSICAL EXAM  General: +frail, thin woman in NAD.   HEENT: EOMI   Neck: supple  CV: normal S1/S2 with no murmur rubs or gallops  Lungs: positive air movement b/l ant lungs,clear to auscultation, no wheezes, no rales  Abdomen: soft non-tender non-distended      LABS:          Serial CBC's  02-22 @ 07:13  Hct-33.3 / Hgb-10.6 / Plat-320 / RBC-3.55 / WBC-9.92          Serial CBC's  02-21 @ 07:22  Hct-37.6 / Hgb-12.0 / Plat-318 / RBC-3.95 / WBC-10.90          Serial CBC's  02-20 @ 21:03  Hct-34.8 / Hgb-10.9 / Plat-304 / RBC-3.73 / WBC-11.13                        WBC Count: 9.92 K/uL (02-22-25 @ 07:13)  Hemoglobin: 10.6 g/dL (02-22-25 @ 07:13)  Hematocrit: 33.3 % (02-22-25 @ 07:13)  Platelet Count - Automated: 320 K/uL (02-22-25 @ 07:13)  WBC Count: 10.90 K/uL (02-21-25 @ 07:22)  Hemoglobin: 12.0 g/dL (02-21-25 @ 07:22)  Hematocrit: 37.6 % (02-21-25 @ 07:22)  Platelet Count - Automated: 318 K/uL (02-21-25 @ 07:22)  WBC Count: 11.13 K/uL (02-20-25 @ 21:03)  Hemoglobin: 10.9 g/dL (02-20-25 @ 21:03)  Hematocrit: 34.8 % (02-20-25 @ 21:03)  Platelet Count - Automated: 304 K/uL (02-20-25 @ 21:03)

## 2025-02-24 NOTE — PROGRESS NOTE ADULT - PROBLEM SELECTOR PLAN 1
-Patient with a hx of colon cancer (initial appt 7/2024)  -S/p abdominal colectomy w/ end ileostomy (may 2024, c/b hypotension + sicu stay)  -Scans from [place] showing progression of metastases to liver and lung; initial stage in july was III s/p resection with negative margins  -new ct here shows metastasis in the lung/liver and adrenal glands  -initally was c/f ileus but this is not present on our CT and pt's ostomy is full of stool    Plan:  -heme onc following, Dr. Jane back on Monday  -Palliative consulted for assistance with GOC  -c/w simethicone, lomotil  -c/w mirtazipine   -c/w lexapro  -c/w questran

## 2025-02-25 PROCEDURE — 99233 SBSQ HOSP IP/OBS HIGH 50: CPT

## 2025-02-25 PROCEDURE — 99232 SBSQ HOSP IP/OBS MODERATE 35: CPT | Mod: GC

## 2025-02-25 RX ADMIN — ENOXAPARIN SODIUM 40 MILLIGRAM(S): 100 INJECTION SUBCUTANEOUS at 05:32

## 2025-02-25 RX ADMIN — Medication 75 MICROGRAM(S): at 05:32

## 2025-02-25 RX ADMIN — Medication 4 GRAM(S): at 08:22

## 2025-02-25 RX ADMIN — CEFTRIAXONE 100 MILLIGRAM(S): 500 INJECTION, POWDER, FOR SOLUTION INTRAMUSCULAR; INTRAVENOUS at 03:25

## 2025-02-25 RX ADMIN — ESCITALOPRAM OXALATE 10 MILLIGRAM(S): 20 TABLET ORAL at 11:26

## 2025-02-25 RX ADMIN — Medication 4 GRAM(S): at 20:58

## 2025-02-25 NOTE — PROGRESS NOTE ADULT - PROBLEM SELECTOR PLAN 7
-Medications from recent stay at [facility] added  -Per son, patient wakes up a little confused but is baseline AO X3    Plan:  -updated medication reconciliation with info from son, who administers her meds at home

## 2025-02-25 NOTE — PROGRESS NOTE ADULT - PROBLEM SELECTOR PLAN 3
-Patient getting ceftriaxone at [facility] for PNA    Plan:  -Start CTX and azithromycin (2/21 - 2/25)  -F/u blood cultures, urine cultures, urine strep, urine legionella --> NGTD as of 2/23

## 2025-02-25 NOTE — PROGRESS NOTE ADULT - SUBJECTIVE AND OBJECTIVE BOX
SUBJECTIVE AND OBJECTIVE:  Indication for Geriatrics and Palliative Care Services/INTERVAL HPI: 88 y/o woman presenting to OSH found to have PNA and UTI now transferred to [facility] for continuation of care and oncology care continuity. GaP team consulted for complex medical decision making in the setting of serious illness and symptoms management.    OVERNIGHT EVENTS: No acute events reported overnight    Patient seen and examined at bedside. Lying in bed comfortably, denies acute pain or other symptoms. Son at bedside.       DNR on chart:Intubate  Intubate      Allergies    nebivolol (Other)    Intolerances    MEDICATIONS  (STANDING):  cholestyramine Powder (Sugar-Free) 4 Gram(s) Oral two times a day  enoxaparin Injectable 40 milliGRAM(s) SubCutaneous every 24 hours  escitalopram 10 milliGRAM(s) Oral daily  levothyroxine 75 MICROGram(s) Oral daily    MEDICATIONS  (PRN):  simethicone 80 milliGRAM(s) Chew two times a day PRN Gas      ITEMS UNCHECKED ARE NOT PRESENT    PRESENT SYMPTOMS: [ ]Unable to self-report - see  CPOT, PAINADS, RDOS below  Source if other than patient:  [ ]Family   [ ]Team     Pain:  [ ]yes [ x]no  QOL impact -   Location -                    Aggravating factors -  Quality -  Radiation -  Timing-  Severity (0-10 scale):  Minimal acceptable level (0-10 scale):     Dyspnea:                           [ ]Mild [ ]Moderate [ ]Severe  Anxiety:                             [ ]Mild [ ]Moderate [ ]Severe  Fatigue:                             [ ]Mild [ ]Moderate [ ]Severe  Nausea:                             [ ]Mild [ ]Moderate [ ]Severe  Loss of appetite:              [ ]Mild [ ]Moderate [ ]Severe  Constipation:                    [ ]Mild [ ]Moderate [ ]Severe    PCSSQ[Palliative Care Spiritual Screening Question]   Severity (0-10):  Score of 4 or > indicate consideration of Chaplaincy referral.  Chaplaincy Referral: [ x yes [ ] refused [ ] following    Caregiver [place]? : [ ] yes [x ] no  [ ] deferred:  Social work referral [ ] Patient & Family Centered Care Referral [ ]     Anticipatory Grief present?:  [ x] yes [ ] no  [ ] deferred: Social work referral [ ] Patient & Family Centered Care Referral [ ]      Other Symptoms:  [ x]All other review of systems negative     PHYSICAL EXAM:  Vital Signs Last 24 Hrs  T(C): 36.6 (25 Feb 2025 13:01), Max: 36.9 (24 Feb 2025 18:59)  T(F): 97.9 (25 Feb 2025 13:01), Max: 98.4 (24 Feb 2025 18:59)  HR: 95 (25 Feb 2025 13:01) (78 - 95)  BP: 97/63 (25 Feb 2025 13:01) (97/63 - 111/65)  BP(mean): --  RR: 18 (25 Feb 2025 13:01) (18 - 18)  SpO2: 95% (25 Feb 2025 13:01) (92% - 95%)    Parameters below as of 25 Feb 2025 04:01  Patient On (Oxygen Delivery Method): room air     I&O's Summary    24 Feb 2025 07:01  -  25 Feb 2025 07:00  --------------------------------------------------------  IN: 390 mL / OUT: 825 mL / NET: -435 mL    25 Feb 2025 07:01  -  25 Feb 2025 15:42  --------------------------------------------------------  IN: 0 mL / OUT: 200 mL / NET: -200 mL       GENERAL:  [x]Alert  [x]Oriented in person and place   [ ]Lethargic  [ ]Cachexia  [ ]Unarousable  [x]Verbal  [ ]Non-Verbal  Behavioral:   [ ]Anxiety  [ ]Delirium [ ]Agitation [ x]Other: pleasant confused   HEENT:  [x]Normal   [ ]Dry mouth   [ ]ET Tube/Trach  [ ]Oral lesions  PULMONARY:   []Clear [ ]Tachypnea  [ ]Audible excessive secretions   [ ]Rhonchi        [ ]Right [ ]Left [ ]Bilateral  [ ]Crackles        [ ]Right [ ]Left [ ]Bilateral  [ ]Wheezing     [ ]Right [ ]Left [ ]Bilateral  [ x]Diminished BS [ ] Right [ ]Left x[ ]Bilateral  CARDIOVASCULAR:    []Regular [ ]Irregular [ ]Tachy  [ ]Henri [ ]Murmur [ ]Other  GASTROINTESTINAL:  [x]Soft  [ ]Distended   []+BS  [x]Non tender [ ]Tender  [ ]PEG [ ]OGT/ NGT   Last BM:  Ostomy bag green liquid   GENITOURINARY:  [x]Normal [ ]Incontinent   [ ]Oliguria/Anuria   [ ]Rogers  MUSCULOSKELETAL:   [ ]Normal   [x]Weakness  [ ]Bed/Wheelchair bound [ ]Edema  NEUROLOGIC:   [x]No focal deficits  [x ] Cognitive impairment  [ ] Dysphagia [ ]Dysarthria [ ] Paresis [ ]Other   SKIN: sacrum and b/l buttock. Please see RN documentation which I have reviewed.  []Normal  [ ]Rash   [x ]Pressure ulcer(s) [x ]y [ ]n present on admission    CRITICAL CARE:  [ ] Shock Present  [ ]Septic [ ]Cardiogenic [ ]Neurologic [ ]Hypovolemic  [ ]  Vasopressors [ ]  Inotropes   [ ]Respiratory failure present [ ]Mechanical ventilation [ ]Non-invasive ventilatory support [ ]High flow    [ ]Acute  [ ]Chronic [ ]Hypoxic  [ ]Hypercarbic [ ]Other  [x ]Other organ failure       LABS: reviewed none new   RADIOLOGY & ADDITIONAL STUDIES: reviewed none new      Protein Calorie Malnutrition Present: [ ]mild [ ]moderate [ ]severe [ ]underweight [ ]morbid obesity  https://www.andeal.org/vault/2440/web/files/ONC/Table_Clinical%20Characteristics%20to%20Document%20Malnutrition-White%20JV%20et%20al%202012.pdf    Height (cm): 160 (05-20-24 @ 10:20)  Weight (kg): 42.41 (07-08-24 @ 15:00), 44.86 (05-20-24 @ 10:20)  BMI (kg/m2): 16.6 (07-08-24 @ 15:00), 17.5 (05-20-24 @ 10:20)    [x ]PPSV2 < or = 30%  [ ]significant weight loss [x ]poor nutritional intake [ ]anasarca[ ]Artificial Nutrition    Other REFERRALS:  [ ]Hospice  [ ]Child Life  [ ]Social Work  [ x]Case management [ ]Holistic Therapy                                    Progress Notes    PROGRESS NOTE  Date & Time of Note   2025-02-24 12:14    Notes    Notes: Covering CM.  Patient medically active on IV Abx for PNA/UTI, pending  palliative for GOC.  D/C plan unclear pending hosp. course       Electronically signed by:  Carline Shirley RN  Electronically signed on:  2025-02-24  12:14  Palliative Performance Scale:  http://npcrc.org/files/news/palliative_performance_scale_ppsv2.pdf  (Ctrl +  left click to view)  Respiratory Distress Observation Tool:  https://homecareinformation.net/handouts/hen/Respiratory_Distress_Observation_Scale.pdf (Ctrl +  left click to view)  PAINAD Score:  http://geriatrictoolkit.[place].[facility]/cog/painad.pdf (Ctrl +  left click to view)

## 2025-02-25 NOTE — PROGRESS NOTE ADULT - PROBLEM SELECTOR PLAN 5
-Patient taking nebivolol at home but stopped at [facility]    Plan:  -Patient normotensive. Will continue to hold nebivolol

## 2025-02-25 NOTE — PROGRESS NOTE ADULT - PROBLEM SELECTOR PLAN 5
HCP: daughter Alex Griggs    I met with the patient and his son this morning. The patient did not show a good understanding of his current medical condition. His son stated that the heme/onc team discussed the CT findings and recommended hospice. The family agreed and would like the patient to be discharged with home hospice services. The patient has private aides and lives with his son, who will also help.    The patient's code status was confirmed; the family would like intubation without CPR.

## 2025-02-25 NOTE — PROGRESS NOTE ADULT - ASSESSMENT
89 y/o F w/ PMH HTN. 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 Pt sat EOB ~10 mins with min A-mod A to maintain upright posture (waxing/waning throughout).

## 2025-02-25 NOTE — PROGRESS NOTE ADULT - ASSESSMENT
88 y/o woman presenting to OSH found to have PNA and UTI now transferred to [facility] for continuation of care and oncology care continuity. Ga[facility] team following for complex medical decision making in the setting of serious illness and symptoms management.

## 2025-02-25 NOTE — PROGRESS NOTE ADULT - PROBLEM SELECTOR PLAN 2
Patient had been experiencing difficulty swallowing which led to anorexia prior to admission. At [facility] they noted a mass in her esophagus.    PLAN:  -bedside dysphagia screen  - passed speech and swallow for "easy to chew, thin liquids"

## 2025-02-25 NOTE — PROGRESS NOTE ADULT - PROBLEM SELECTOR PLAN 1
- Follows Carlotta Jane, last seen on July 2024  - Dr. Jane discussed image findings with family, recommended hospice. Family agrees with home hospice referral   - symptom directed approach.

## 2025-02-25 NOTE — PROGRESS NOTE ADULT - PROBLEM SELECTOR PLAN 6
In the event patient is symptomatic  would recommend the following:    - Morphine concentrate 3 mg q4hrs PRN for pain/dyspnea  - Oral Ativan 0.25mg q6 PRN anxiety/agitation/ refractory dyspnea  - Scopolamine 1 patch q72h for excessive oral secretions  - Primary team to prescribe discharge medications to cover patient’s needs. For pain being treated as a part of cancer care, hospice or other end-of-life care, or pain being treated as part of palliative care prescribing opioids beyond the 7-day and less than 28 days post-discharge window IS ACCEPTABLE as per [place] Department of Health (https://www.health.[place].gov/professionals/narcotic/laws_and_regulations/).  Primary team to  make sure that any pre-approvals are done before discharge.      Goals are establish and patient asymptomatic. Palliative care consult appreciated and completed. Palliative care team will sign off. The team remains available if additional services are needed. Please reconsult as needed. Discussed with the primary team.

## 2025-02-25 NOTE — PROGRESS NOTE ADULT - CONVERSATION DETAILS
Continue conversation that was started on 2/21.    I met with the patient and his son this morning. The patient did not show a good understanding of his current medical condition. His son stated that the heme/onc team discussed the CT findings and recommended hospice. The family agreed and would like the patient to be discharged with home hospice services. The patient has private aides and lives with his son, who will also help.    The patient's code status was confirmed; the family would like intubation without CPR.
The family is currently waiting on the oncology team to sit with them and Ms. Gabriel prior to making any decisions.

## 2025-02-25 NOTE — PROGRESS NOTE ADULT - PROBLEM SELECTOR PLAN 1
-Patient with a hx of colon cancer (initial appt 7/2024)  -S/p abdominal colectomy w/ end ileostomy (may 2024, c/b hypotension + sicu stay)  -Scans from [place] showing progression of metastases to liver and lung; initial stage in july was III s/p resection with negative margins  -new ct here shows metastasis in the lung/liver and adrenal glands  -initally was c/f ileus but this is not present on our CT and pt's ostomy is full of stool    Plan:  -heme onc not offering treatment  -hospice referral  -c/w simethicone, lomotil  -c/w mirtazipine   -c/w lexapro  -c/w questran

## 2025-02-25 NOTE — PROGRESS NOTE ADULT - SUBJECTIVE AND OBJECTIVE BOX
Patient is a 89y old  Female who presents with a chief complaint of Progression of colon CA (2025 07:33)      ======Overnight/Subjective======  Overnight, no events. Patient is unaware of why she's in the hospital. Stated her children are handling things for her. Denies headaches, dizziness, chest pain, sob, nausea, vomiting, hematuria, hematochezia.         ======Medications======  MEDICATIONS  (STANDING):  cholestyramine Powder (Sugar-Free) 4 Gram(s) Oral two times a day  enoxaparin Injectable 40 milliGRAM(s) SubCutaneous every 24 hours  escitalopram 10 milliGRAM(s) Oral daily  levothyroxine 75 MICROGram(s) Oral daily    MEDICATIONS  (PRN):  simethicone 80 milliGRAM(s) Chew two times a day PRN Gas      ======Vital Signs======  T(C): 36.6 (25 @ 04:01), Max: 36.9 (25 @ 18:59)  T(F): 97.9 (25 @ 04:01), Max: 98.4 (25 @ 18:59)  HR: 78 (25 @ 04:01) (78 - 89)  BP: 111/65 (25 @ 04:01) (91/59 - 111/65)  BP(mean): --  RR: 18 (25 @ 04:01) (18 - 18)  SpO2: 92% (25 @ 04:01) (92% - 94%)    ======Physical exams======  GENERAL: NAD  Cardiovascular: RRR, S1 S2, no m/r/g  LUNGS: Unlabored respiration, CTABL  ABDOMEN: Soft, NTND  EXTREMITIES: Warm extremities, no edema  NEURO: AAOx2 (Knew name, she was in the hospital but not which hospital or why. Didn't know date.)    ======Labs======                10.6[L]  9.92 >----------< 320  (MCV: 93.8)                33.3[L]   139 | 103 | 12  -----------------------< 186[H]  3.5 | 24 | 0.72    TPro: 5.4[L] / Alb: 2.5[L] / TBili: 0.6 / DBili: -- / AlkPhos: 80 / ALT: 12 / AST: 27 (25 @ 07:11)  Ca: 8.5 / Phos: 2.5 / M.4[L] (25 @ 07:11)    Gas: 7.38 / 47[H] / 28 / 28 / 47.3[L]% / 2.0 (25 @ 07:18)      ======Microbiology======        ======I&O's======  I&O's Summary    2025 07:01  -  2025 07:00  --------------------------------------------------------  IN: 390 mL / OUT: 825 mL / NET: -435 mL

## 2025-02-25 NOTE — PROGRESS NOTE ADULT - ATTENDING COMMENTS
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 Additionally with UTI, PNA (Compled course of CTX and Azithro 2/22-2/25), NGTD on cultures) with initial c/f ileus now ruled out. Repeat CT here shows metastases in the lung/liver and adrenal glands. Per oncology pt with advanced uncurable dz. Palliative on board- After discussion with oncology team, family has decided to pursue hospice. At this time the daughter would like the patient to be DNR given overall poor prognosis,  but will further discuss with her mother and let team know if there are any changes to code status.

## 2025-02-25 NOTE — PROGRESS NOTE ADULT - PROBLEM SELECTOR PLAN 4
-Patient diagnosed with UTI at [facility]  -Treatment unclear    Plan:  -C/w ceftriaxone for 5 day course

## 2025-02-26 LAB
CULTURE RESULTS: SIGNIFICANT CHANGE UP
CULTURE RESULTS: SIGNIFICANT CHANGE UP
SPECIMEN SOURCE: SIGNIFICANT CHANGE UP
SPECIMEN SOURCE: SIGNIFICANT CHANGE UP

## 2025-02-26 PROCEDURE — 99232 SBSQ HOSP IP/OBS MODERATE 35: CPT | Mod: GC

## 2025-02-26 RX ADMIN — ESCITALOPRAM OXALATE 10 MILLIGRAM(S): 20 TABLET ORAL at 11:34

## 2025-02-26 RX ADMIN — Medication 4 GRAM(S): at 20:29

## 2025-02-26 RX ADMIN — Medication 75 MICROGRAM(S): at 05:05

## 2025-02-26 RX ADMIN — ENOXAPARIN SODIUM 40 MILLIGRAM(S): 100 INJECTION SUBCUTANEOUS at 05:04

## 2025-02-26 RX ADMIN — Medication 4 GRAM(S): at 08:49

## 2025-02-26 NOTE — PROGRESS NOTE ADULT - PROBLEM SELECTOR PLAN 2
Patient had been experiencing difficulty swallowing which led to anorexia prior to admission. At [facility] they noted a mass in her esophagus.    PLAN:  -bedside dysphagia screen  -passed speech and swallow for "easy to chew, thin liquids"

## 2025-02-26 NOTE — PROGRESS NOTE ADULT - PROBLEM SELECTOR PLAN 5
-Medications from recent stay at [facility] added  -Per son, patient wakes up a little confused but is baseline AO X3    Plan:  -updated medication reconciliation with info from son, who administers her meds at home  RESOLVED

## 2025-02-26 NOTE — PROGRESS NOTE ADULT - PROBLEM SELECTOR PLAN 6
-Patient treated with ceftriaxone at [facility] for PNA    Plan:  -CTX and azithromycin (2/21 - 2/25)  -F/u blood cultures, urine cultures, urine strep, urine legionella --> NGTD as of 2/23  RESOLVED

## 2025-02-26 NOTE — PROGRESS NOTE ADULT - ASSESSMENT
89 y/o F w/ PMH HTN. 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

## 2025-02-26 NOTE — PROGRESS NOTE ADULT - PROBLEM SELECTOR PLAN 3
-Patient taking nebivolol at home but stopped at [facility]    Plan:  -Patient normotensive. Will continue to hold nebivolol

## 2025-02-26 NOTE — CHART NOTE - NSCHARTNOTEFT_GEN_A_CORE
NUTRITION FOLLOW UP NOTE    PATIENT SEEN FOR: First malnutrition PO follow up    SOURCE: [x] Patient  [x] Current Medical Record  [] RN  [x] Family/support person at bedside: son  [] Patient unavailable/inappropriate  [] Other:    CHART REVIEWED/EVENTS NOTED.  [] No changes to nutrition care plan to note  [x] Nutrition Status:  - hx of colon cancer (stage III), s/p abdominal colectomy w/ end ileostomy (2024)  - Ongoing GOC discussion with family    DIET ORDER:   Diet, Soft and Bite Sized:   Supplement Feeding Modality:  Oral  Ensure Plus High Protein Cans or Servings Per Day:  1       Frequency:  Three Times a day (25 @ 12:57) [Active]    CURRENT DIET ORDER IS:  [] Appropriate:  [] Inadequate:  [x] Other: see recommendations below.     NUTRITION INTAKE/PROVISION:  [x] PO: Writer met patient and son at the bedside. Per son, patient continues with poor appetite/PO intake in-house, reports patient refuses to eat anything. Lunch tray observed untouched, only noted had a few spoonful of rice pudding. Son reports patient drinks 2-3 Ensure plus daily.  Pt likely meeting <75% of estimated nutritional needs. Food preferences obtained, will honor as able to optimize PO intake. Denies chewing/swallowing difficulties at this time. No other GI distress reported.  Noted to have liquid dark brown ileostomy output per flowsheet.   [] Enteral Nutrition:  [] Parenteral Nutrition:    ANTHROPOMETRICS:  Drug Dosing Weight  Height (cm): 160 (20 May 2024 10:20)  Weight (kg): 42.41 (2024 15:00)  BMI (kg/m2): 16.6 (2024 15:00)  BSA (m2): 1.4 (2024 15:00)  Weights:   Daily Weight in k ()   ** Weight fluctuations likely in setting of fluid shifts and/or scale discrepancies and/or inadequate PO intake. RD will continue to monitor weight trends as available/able.       MEDICATIONS:  MEDICATIONS  (STANDING):  cholestyramine Powder (Sugar-Free) 4 Gram(s) Oral two times a day  enoxaparin Injectable 40 milliGRAM(s) SubCutaneous every 24 hours  escitalopram 10 milliGRAM(s) Oral daily  levothyroxine 75 MICROGram(s) Oral daily    MEDICATIONS  (PRN):  simethicone 80 milliGRAM(s) Chew two times a day PRN Gas      NUTRITIONALLY PERTINENT LABS:    Phos 2.5 mg/dL  Alb 2.5 g/dL[L] ALT 12 U/L AST 27 U/L Alkaline Phosphatase 80 U/L    NUTRITIONALLY PERTINENT MEDICATIONS/LABS:  [x] Reviewed  [x] Relevant notes on medications/labs:  - Hypothyroidism ordered for synthroid     EDEMA:  [x] Reviewed  [x] Relevant notes: No edema noted per nursing flowsheet     GI/ I&O:  [x] Reviewed  [x] Relevant notes: Liquid dark brown ileostomy output per flowsheet.   [] Other:    SKIN:   [] No pressure injuries documented, per nursing flowsheet  [x] Pressure injury previously noted: Bilateral:, buttocks, sacrum, Stage I  [x] Change in pressure injury documentation: stage I bilateral buttocks/sacrum pressure injury (), stage I bilateral heel pressure injury () per flowsheet.  [] Other:    ESTIMATED NEEDS:  Energy:  5239-8744.4 kcal/day (28-33 kcal/kg)  Protein: 59.7-69.7  g/day (1.2-1.4 g/kg)  Fluid:   ml/day or [x] defer to team  Based on: IBW 49.8 kg     NUTRITION DIAGNOSIS:  [x] Prior Dx: severe acute malnutrition  [] New Dx:    EDUCATION:  [x] Yes: Emphasized the importance of adequate kcal and protein intake; recommend to optimize nutritional intake in case of decreased appetite; recommended small frequent meals by ordering nutrient-dense snacks and leaving non-perishable food away from tray for later consumption during the day or between meals; to start with protein, and sips of supplement throughout the day; reviewed foods with protein and menu order procedures in hospital. Pt and family made aware RD remains available and will follow up for any additional questions/concerns and per protocol.   [] Not appropriate/warranted    NUTRITION CARE PLAN:  1. Diet: Continue diet free of therapeutic restrictions. Consistency deferred to Speech Language Pathologist and MD team  2. Supplements: continue Ensure plus high protein 3x daily (1050kcal, 60g proteins) to optimize PO intake  3. Food preferences obtained, will honor as able to optimize PO intake   4. Encourage PO intake via small frequent and nutrient dense meals   5. RN/staff/family to provide feeding assistance during meal times as able   6. Continue to monitor PO intake, weight trend, electrolytes, blood glucose, labs, BMs in-house   7. Nutrition recommendations/interventions that align with GOC.     [] Achieved - Continue current nutrition intervention(s)  [] Current medical condition precludes nutrition intervention at this time.    MONITORING AND EVALUATION:   RD remains available upon request and will follow up per protocol.    Name  Leann Bonilla RDN, CDN / TEAMS Available on [place] TEAMS

## 2025-02-26 NOTE — PROGRESS NOTE ADULT - PROBLEM SELECTOR PLAN 7
-Patient diagnosed with UTI at [facility]  -Treatment unclear    Plan:  -C/w ceftriaxone for 5 day course, ended 2/25  RESOLVED

## 2025-02-26 NOTE — PROGRESS NOTE ADULT - PROBLEM SELECTOR PLAN 1
-Patient with a hx of colon cancer (initial appt 7/2024)  -S/p abdominal colectomy w/ end ileostomy (may 2024, c/b hypotension + sicu stay)  -Scans from [place] showing progression of metastases to liver and lung; initial stage in july was III s/p resection with negative margins  -new ct here shows metastasis in the lung/liver and adrenal glands  -initally was c/f ileus but this is not present on our CT and pt's ostomy is full of stool    Plan:  -heme onc not offering treatment  -hospice referral placed  -c/w simethicone, lomotil  -c/w mirtazipine   -c/w lexapro  -c/w questran

## 2025-02-26 NOTE — PROGRESS NOTE ADULT - SUBJECTIVE AND OBJECTIVE BOX
Patient is a 89y old  Female who presents with a chief complaint of Progression of colon CA (2025 15:41)      ======Overnight/Subjective======  Overnight, no events. Seen and examined at bedside. Unchanged from previous.    Brief daily plan: hospice referral    ======Medications======  MEDICATIONS  (STANDING):  cholestyramine Powder (Sugar-Free) 4 Gram(s) Oral two times a day  enoxaparin Injectable 40 milliGRAM(s) SubCutaneous every 24 hours  escitalopram 10 milliGRAM(s) Oral daily  levothyroxine 75 MICROGram(s) Oral daily    MEDICATIONS  (PRN):  simethicone 80 milliGRAM(s) Chew two times a day PRN Gas      ======Vital Signs======  T(C): 36.4 (25 @ 04:32), Max: 36.9 (25 @ 19:43)  T(F): 97.5 (25 @ 04:32), Max: 98.4 (25 @ 19:43)  HR: 87 (25 @ 04:32) (75 - 95)  BP: 112/64 (25 @ 04:32) (97/63 - 116/65)  BP(mean): --  RR: 18 (25 @ 04:32) (18 - 18)  SpO2: 94% (25 @ 04:32) (93% - 95%)    ======Physical exams======  GENERAL: NAD  Cardiovascular: RRR, S1 S2, no m/r/g  LUNGS: Unlabored respiration, CTABL  ABDOMEN: Soft, ostomy with stool  EXTREMITIES: Warm extremities, no edema  NEURO: AAOx1-2    ======Labs======                10.6[L]  9.92 >----------< 320  (MCV: 93.8)                33.3[L]   139 | 103 | 12  -----------------------< 186[H]  3.5 | 24 | 0.72    TPro: 5.4[L] / Alb: 2.5[L] / TBili: 0.6 / DBili: -- / AlkPhos: 80 / ALT: 12 / AST: 27 (25 @ 07:11)  Ca: 8.5 / Phos: 2.5 / M.4[L] (25 @ 07:11)    Gas: 7.38 / 47[H] /  / 28 / 47.3[L]% / 2.0 (25 @ 07:18)      ======Microbiology======        ======I&O's======  I&O's Summary    2025 07:01  -  2025 07:00  --------------------------------------------------------  IN: 200 mL / OUT: 1100 mL / NET: -900 mL

## 2025-02-26 NOTE — PROGRESS NOTE ADULT - ATTENDING COMMENTS
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 Additionally with UTI, PNA (Compled course of CTX and Azithro 2/22-2/25), NGTD on cultures) with initial c/f ileus now ruled out. Repeat CT here shows metastases in the lung/liver and adrenal glands. Per oncology pt with advanced uncurable dz. Palliative on board- After discussion with oncology team, family has decided to pursue hospice. At this time the daughter would like the patient to be DNR given overall poor prognosis,  home w/ home hospice.

## 2025-02-27 ENCOUNTER — TRANSCRIPTION ENCOUNTER (OUTPATIENT)
Age: 89
End: 2025-02-27

## 2025-02-27 PROCEDURE — 99231 SBSQ HOSP IP/OBS SF/LOW 25: CPT | Mod: GC

## 2025-02-27 RX ADMIN — Medication 4 GRAM(S): at 09:47

## 2025-02-27 RX ADMIN — Medication 4 GRAM(S): at 20:26

## 2025-02-27 RX ADMIN — Medication 75 MICROGRAM(S): at 05:39

## 2025-02-27 RX ADMIN — ENOXAPARIN SODIUM 40 MILLIGRAM(S): 100 INJECTION SUBCUTANEOUS at 05:39

## 2025-02-27 RX ADMIN — ESCITALOPRAM OXALATE 10 MILLIGRAM(S): 20 TABLET ORAL at 11:58

## 2025-02-27 NOTE — DISCHARGE NOTE PROVIDER - NSDCFUSCHEDAPPT_GEN_ALL_CORE_FT
Julio Chung  [facility]  [address]  Scheduled Appointment: 03/13/2025

## 2025-02-27 NOTE — DISCHARGE NOTE PROVIDER - HOSPITAL COURSE
HPI:  Patient is poor historian and collateral from family was unable to be obtained overnight. What follows is the history signed out from [facility].    89 y/o F w/ PMH HTN. 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 with cough and post-tussive emesis. She was found to have a UTI and community acquired PNA and started on ceftriaxone. CT imaging at [facility] showing progression of metastases to liver and lung. Patient and family requesting transfer to [facility] for further management given that Dr. Rama Jane is her oncologist. Dr. Jane accepted the transfer and patient admitted to medicine service for further management.    Upon arrival, patient was vitally stable and afebrile. Labs significant for WBC 11, Hgb 10.9.    (21 Feb 2025 01:09)    Hospital Course:  Patient was admitted to the hospital for evaluation of her cancer progression i/s/o increased weakness after scans at [place] showed diffuse metastasis in the lungs and liver. Patient had seen Dr. Jane in July after her surgery for stage 3 colon cancer. At the time, they deferred systemic chemotherapy. However, in light of these new results from [place], the family wanted to reconsult Dr. Jane to see what further management was available. Dr. Jane is not offering systemic chemotherapy. Family made patient DNR but would still intubate her. Deferred hospice at this time in favor of going home with home aid.    Important Medication Changes and Reason:  None    Active or Pending Issues Requiring Follow-up:  None    Advanced Directives:   [X] Full code  [ ] DNR  [ ] Hospice    Discharge Diagnoses:  Metastatic cancer       HPI:  Patient is poor historian and collateral from family was unable to be obtained overnight. What follows is the history signed out from [facility].    89 y/o F w/ PMH HTN. 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 She was found to have a UTI and community acquired PNA and started on ceftriaxone. CT imaging at [facility] showing progression of metastases to liver and lung. Patient and family requesting transfer to [facility] for further management given that Dr. Rama Jane is her oncologist. Dr. Jane accepted the transfer and patient admitted to medicine service for further management.    Upon arrival, patient was vitally stable and afebrile. Labs significant for WBC 11, Hgb 10.9.    (21 Feb 2025 01:09)    Hospital Course:  Patient was admitted to the hospital for evaluation of her cancer progression i/s/o increased weakness after scans at [place] showed diffuse metastasis in the lungs and liver. Patient had seen Dr. Jane in July after her surgery for stage 3 colon cancer. At the time, they deferred systemic chemotherapy. However, in light of these new results from [place], the family wanted to reconsult Dr. Jane to see what further management was available. Dr. Jane is not offering systemic chemotherapy. Family made patient DNR but would still intubate her. Deferred hospice at this time in favor of going home with home aid.    Important Medication Changes and Reason:  None    Active or Pending Issues Requiring Follow-up:  None    Advanced Directives:   [ ] Full code  [X] DNR  [ ] Hospice    Discharge Diagnoses:  Metastatic cancer

## 2025-02-27 NOTE — PROGRESS NOTE ADULT - ATTENDING COMMENTS
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 Additionally with UTI, PNA (Compled course of CTX and Azithro 2/22-2/25), NGTD on cultures) with initial c/f ileus now ruled out. Repeat CT here shows metastases in the lung/liver and adrenal glands. Per oncology pt with advanced uncurable dz. Palliative on board- After discussion with oncology team, family has decided to pursue hospice. At this time the daughter would like the patient to be DNR given overall poor prognosis,  home w/ home hospice.

## 2025-02-27 NOTE — PROGRESS NOTE ADULT - PROBLEM SELECTOR PLAN 6
-Patient treated with ceftriaxone at [facility] for PNA    Plan:  -CTX and azithromycin (2/21 - 2/25)  -F/u blood cultures, urine cultures, urine strep, urine legionella --> NGTD as of 2/23  RESOLVED

## 2025-02-27 NOTE — DISCHARGE NOTE PROVIDER - ATTENDING DISCHARGE PHYSICAL EXAMINATION:
GENERAL: NAD, awake and looking around  Cardiovascular: RRR, S1 S2, no m/r/g  LUNGS: Unlabored respiration, CTABL  ABDOMEN: deferred  EXTREMITIES: Warm extremities  NEURO: AAOx2

## 2025-02-27 NOTE — PROGRESS NOTE ADULT - PROBLEM SELECTOR PLAN 3
-Patient taking nebivolol at home but stopped at [facility]    Plan:  -Patient normotensive. Will continue to hold nebivolol

## 2025-02-27 NOTE — DISCHARGE NOTE PROVIDER - NSDCCPCAREPLAN_GEN_ALL_CORE_FT
PRINCIPAL DISCHARGE DIAGNOSIS  Diagnosis: Colon cancer  Assessment and Plan of Treatment: You were diagnosed with colon cancer in May of last year. You had an abdominal colectomy with end ileostomy with clear marigins. In July, you met with Dr. Jane and decided not to pursue systemic chemotherapy. Now you have been feeling weaker and you presented to [facility]. In scans there, they found that your cancer had progressed. You were transferred here and evaluated by Dr. Jane who also confirmed that your cancer has progressed and that chemotherapy is not a good option for you. Please return to the hospital if you experience fever, chills, severe headache, dizziness, lightheadedness, loss of consciousness, visual disturbance, chest pain, palpitations, shortness of breath,  abdominal pain, nausea, vomiting, diarrhea, blood in your vomit/stool/urine, burning with urination or change in urinary pattern, numbness, weakness, tingling, or other alarming symptoms.

## 2025-02-27 NOTE — PROGRESS NOTE ADULT - SUBJECTIVE AND OBJECTIVE BOX
Patient is a 89y old  Female who presents with a chief complaint of Progression of colon CA (2025 07:35)      ======Overnight/Subjective======  Overnight, no events. Patient is going home with 24 hour aid assistance the family will pay for out of pocket.    ======Medications======  MEDICATIONS  (STANDING):  cholestyramine Powder (Sugar-Free) 4 Gram(s) Oral two times a day  enoxaparin Injectable 40 milliGRAM(s) SubCutaneous every 24 hours  escitalopram 10 milliGRAM(s) Oral daily  levothyroxine 75 MICROGram(s) Oral daily    MEDICATIONS  (PRN):  simethicone 80 milliGRAM(s) Chew two times a day PRN Gas      ======Vital Signs======  T(C): 36.7 (25 @ 04:20), Max: 36.7 (25 @ 12:05)  T(F): 98 (25 @ 04:20), Max: 98 (25 @ 12:05)  HR: 86 (25 @ 04:20) (80 - 93)  BP: 110/69 (25 @ 04:20) (109/65 - 118/60)  BP(mean): --  RR: 18 (25 @ 04:20) (18 - 18)  SpO2: 96% (25 @ 04:20) (94% - 96%)    ======Physical exams======  GENERAL: NAD, resting comfortably  Cardiovascular: RRR, S1 S2, no m/r/g  LUNGS: Unlabored respiration, CTABL anteriorly  ABDOMEN: Soft, ostomy with brown stool  EXTREMITIES: Warm extremities  NEURO: AAOx1    ======Labs======                10.6[L]  9.92 >----------< 320  (MCV: 93.8)                33.3[L]   139 | 103 | 12  -----------------------< 186[H]  3.5 | 24 | 0.72    TPro: 5.4[L] / Alb: 2.5[L] / TBili: 0.6 / DBili: -- / AlkPhos: 80 / ALT: 12 / AST: 27 (25 @ 07:11)  Ca: 8.5 / Phos: 2.5 / M.4[L] (25 @ 07:11)    Gas: 7.38 / 47[H] /  / 28 / 47.3[L]% / 2.0 (25 @ 07:18)      ======Microbiology======        ======I&O's======  I&O's Summary    2025 07:01  -  2025 07:00  --------------------------------------------------------  IN: 640 mL / OUT: 1200 mL / NET: -560 mL

## 2025-02-27 NOTE — PROGRESS NOTE ADULT - ASSESSMENT
88 y/o F w/ PMH HTN. 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

## 2025-02-27 NOTE — PROGRESS NOTE ADULT - PROBLEM SELECTOR PLAN 1
-Patient with a hx of colon cancer (initial appt 7/2024)  -S/p abdominal colectomy w/ end ileostomy (may 2024, c/b hypotension + sicu stay)  -Scans from [place] showing progression of metastases to liver and lung; initial stage in july was III s/p resection with negative margins  -new ct here shows metastasis in the lung/liver and adrenal glands  -initally was c/f ileus but this is not present on our CT and pt's ostomy is full of stool    Plan:  -heme onc not offering treatment  -hospice referral placed, family going to have out-of-pocket aid instead  -c/w simethicone, lomotil  -c/w mirtazipine   -c/w lexapro  -c/w questran

## 2025-02-27 NOTE — DISCHARGE NOTE PROVIDER - NSDCMRMEDTOKEN_GEN_ALL_CORE_FT
levothyroxine 75 mcg (0.075 mg) oral capsule: 1 cap(s) orally once a day  Lexapro 10 mg oral tablet: 1 tab(s) orally once a day  Lomotil 2.5 mg-0.025 mg oral tablet: 2 tab(s) orally 2 times a day  mirtazapine 7.5 mg oral tablet: 1 tab(s) orally once a day (at bedtime)  Questran 4 g/9 g oral powder for reconstitution: 4 gram(s) orally 2 times a day  simethicone 125 mg oral capsule: 1 cap(s) orally once a day as needed for gas   akanksha lift: Please use akanksha lift for transfers from the bed to chair/wheelchair/commode  levothyroxine 75 mcg (0.075 mg) oral capsule: 1 cap(s) orally once a day  Lexapro 10 mg oral tablet: 1 tab(s) orally once a day  Lomotil 2.5 mg-0.025 mg oral tablet: 2 tab(s) orally 2 times a day  mirtazapine 7.5 mg oral tablet: 1 tab(s) orally once a day (at bedtime)  Questran 4 g/9 g oral powder for reconstitution: 4 gram(s) orally 2 times a day  simethicone 125 mg oral capsule: 1 cap(s) orally once a day as needed for gas   home PT: patient needs home PT to increase her strength so she can safely transfer etc. Attending physician: Sonam Scruggs NPI:[id number]  akanksha lift: Please use akanksha lift for transfers from the bed to chair/wheelchair/commode  levothyroxine 75 mcg (0.075 mg) oral capsule: 1 cap(s) orally once a day  Lexapro 10 mg oral tablet: 1 tab(s) orally once a day  Lomotil 2.5 mg-0.025 mg oral tablet: 2 tab(s) orally 2 times a day  mirtazapine 7.5 mg oral tablet: 1 tab(s) orally once a day (at bedtime)  Questran 4 g/9 g oral powder for reconstitution: 4 gram(s) orally 2 times a day  simethicone 125 mg oral capsule: 1 cap(s) orally once a day as needed for gas

## 2025-02-27 NOTE — DISCHARGE NOTE PROVIDER - CARE PROVIDER_API CALL
Julio Chung  Surgery  [address] [place] [address]  Phone: [phone]  Fax: [phone]  Established Patient  Follow Up Time:

## 2025-02-27 NOTE — PROGRESS NOTE ADULT - PROBLEM SELECTOR PLAN 5
-Medications from recent stay at [facility] added  -Per son, patient wakes up a little confused but is baseline AO X3    Plan:  -updated medication reconciliation with info from son, who administers her meds at home  RESOLVED

## 2025-02-27 NOTE — PROGRESS NOTE ADULT - PROBLEM SELECTOR PLAN 7
-Patient diagnosed with UTI at [facility]  -Treatment unclear    Plan:  -C/w ceftriaxone for 5 day course, ended 2/25  RESOLVED

## 2025-02-27 NOTE — DISCHARGE NOTE PROVIDER - DETAILS OF MALNUTRITION DIAGNOSIS/DIAGNOSES
This patient has been assessed with a concern for Malnutrition and was treated during this hospitalization for the following Nutrition diagnosis/diagnoses:     -  02/22/2025: Severe protein-calorie malnutrition   -  02/22/2025: Underweight (BMI < 19)

## 2025-02-27 NOTE — DISCHARGE NOTE PROVIDER - NSDCCPTREATMENT_GEN_ALL_CORE_FT
PRINCIPAL PROCEDURE  Procedure: CT abdomen pelvis  Findings and Treatment: INTERPRETATION:  CLINICAL INFORMATION: Colon cancer staging.  COMPARISON: CT chest 2/17/2025 from outside institution. CT abdomen and   pelvis 2/18/2025 from outside institution.  CONTRAST/COMPLICATIONS:  IV Contrast: Omnipaque 350  80 cc administered   20 cc discarded  Oral Contrast: NONE  IMPRESSION:  Status post colectomy with right lower quadrant ileostomy.  Diffuse pulmonary and hepatic metastases.  Thickened and heterogeneous adrenal glands suspicious for metastatic   disease.  Intra-abdominal lymphadenopathy.  Narrowed portal splenic confluence due to an enlarged and infiltrated   left hepatic lobe.  Small bilateral pleural effusions, increased since 2/17/2025.

## 2025-02-28 ENCOUNTER — TRANSCRIPTION ENCOUNTER (OUTPATIENT)
Age: 89
End: 2025-02-28

## 2025-02-28 VITALS
DIASTOLIC BLOOD PRESSURE: 66 MMHG | TEMPERATURE: 98 F | OXYGEN SATURATION: 94 % | SYSTOLIC BLOOD PRESSURE: 104 MMHG | HEART RATE: 88 BPM | RESPIRATION RATE: 18 BRPM

## 2025-02-28 PROCEDURE — 80053 COMPREHEN METABOLIC PANEL: CPT

## 2025-02-28 PROCEDURE — 82435 ASSAY OF BLOOD CHLORIDE: CPT

## 2025-02-28 PROCEDURE — 97162 PT EVAL MOD COMPLEX 30 MIN: CPT

## 2025-02-28 PROCEDURE — 71045 X-RAY EXAM CHEST 1 VIEW: CPT

## 2025-02-28 PROCEDURE — 83605 ASSAY OF LACTIC ACID: CPT

## 2025-02-28 PROCEDURE — 84295 ASSAY OF SERUM SODIUM: CPT

## 2025-02-28 PROCEDURE — 83010 ASSAY OF HAPTOGLOBIN QUANT: CPT

## 2025-02-28 PROCEDURE — 81001 URINALYSIS AUTO W/SCOPE: CPT

## 2025-02-28 PROCEDURE — 87899 AGENT NOS ASSAY W/OPTIC: CPT

## 2025-02-28 PROCEDURE — 84100 ASSAY OF PHOSPHORUS: CPT

## 2025-02-28 PROCEDURE — 83735 ASSAY OF MAGNESIUM: CPT

## 2025-02-28 PROCEDURE — 86301 IMMUNOASSAY TUMOR CA 19-9: CPT

## 2025-02-28 PROCEDURE — 85025 COMPLETE CBC W/AUTO DIFF WBC: CPT

## 2025-02-28 PROCEDURE — 92610 EVALUATE SWALLOWING FUNCTION: CPT

## 2025-02-28 PROCEDURE — 82330 ASSAY OF CALCIUM: CPT

## 2025-02-28 PROCEDURE — 84132 ASSAY OF SERUM POTASSIUM: CPT

## 2025-02-28 PROCEDURE — 82378 CARCINOEMBRYONIC ANTIGEN: CPT

## 2025-02-28 PROCEDURE — 83550 IRON BINDING TEST: CPT

## 2025-02-28 PROCEDURE — 97530 THERAPEUTIC ACTIVITIES: CPT

## 2025-02-28 PROCEDURE — 82728 ASSAY OF FERRITIN: CPT

## 2025-02-28 PROCEDURE — 82947 ASSAY GLUCOSE BLOOD QUANT: CPT

## 2025-02-28 PROCEDURE — 83540 ASSAY OF IRON: CPT

## 2025-02-28 PROCEDURE — 82803 BLOOD GASES ANY COMBINATION: CPT

## 2025-02-28 PROCEDURE — 97110 THERAPEUTIC EXERCISES: CPT

## 2025-02-28 PROCEDURE — 87449 NOS EACH ORGANISM AG IA: CPT

## 2025-02-28 PROCEDURE — 74177 CT ABD & PELVIS W/CONTRAST: CPT | Mod: MC

## 2025-02-28 PROCEDURE — 85027 COMPLETE CBC AUTOMATED: CPT

## 2025-02-28 PROCEDURE — 99238 HOSP IP/OBS DSCHRG MGMT 30/<: CPT

## 2025-02-28 PROCEDURE — 86304 IMMUNOASSAY TUMOR CA 125: CPT

## 2025-02-28 PROCEDURE — 87040 BLOOD CULTURE FOR BACTERIA: CPT

## 2025-02-28 PROCEDURE — 85018 HEMOGLOBIN: CPT

## 2025-02-28 PROCEDURE — 92507 TX SP LANG VOICE COMM INDIV: CPT

## 2025-02-28 PROCEDURE — 85014 HEMATOCRIT: CPT

## 2025-02-28 PROCEDURE — 83615 LACTATE (LD) (LDH) ENZYME: CPT

## 2025-02-28 RX ADMIN — Medication 75 MICROGRAM(S): at 05:09

## 2025-02-28 RX ADMIN — ENOXAPARIN SODIUM 40 MILLIGRAM(S): 100 INJECTION SUBCUTANEOUS at 05:07

## 2025-02-28 NOTE — PROGRESS NOTE ADULT - SUBJECTIVE AND OBJECTIVE BOX
Patient is a 89y old  Female who presents with a chief complaint of Progression of colon CA (2025 10:16)      ======Overnight/Subjective======  Overnight    Subjective    Brief daily plan    ======Medications======  MEDICATIONS  (STANDING):  cholestyramine Powder (Sugar-Free) 4 Gram(s) Oral two times a day  enoxaparin Injectable 40 milliGRAM(s) SubCutaneous every 24 hours  escitalopram 10 milliGRAM(s) Oral daily  levothyroxine 75 MICROGram(s) Oral daily    MEDICATIONS  (PRN):  simethicone 80 milliGRAM(s) Chew two times a day PRN Gas      ======Vital Signs======  T(C): 36.7 (25 @ 04:21), Max: 36.7 (25 @ 04:21)  T(F): 98 (25 @ 04:21), Max: 98 (25 @ 04:21)  HR: 88 (25 @ 04:21) (72 - 95)  BP: 104/66 (25 @ 04:21) (104/66 - 113/70)  BP(mean): --  RR: 18 (25 @ 04:21) (18 - 18)  SpO2: 94% (25 @ 04:21) (92% - 94%)    ======Physical exams======  GENERAL: NAD, awake and looking around  Cardiovascular: RRR, S1 S2, no m/r/g  LUNGS: Unlabored respiration, CTABL  ABDOMEN: deferred  EXTREMITIES: Warm extremities  NEURO: AAOx2    ======Labs======                10.6[L]  9.92 >----------< 320  (MCV: 93.8)                33.3[L]   139 | 103 | 12  -----------------------< 186[H]  3.5 | 24 | 0.72    TPro: 5.4[L] / Alb: 2.5[L] / TBili: 0.6 / DBili: -- / AlkPhos: 80 / ALT: 12 / AST: 27 (25 @ 07:11)  Ca: 8.5 / Phos: 2.5 / M.4[L] (25 @ 07:11)    Gas: 7.38 / 47[H] /  / 47.3[L]% / 2.0 (25 @ 07:18)      ======Microbiology======        ======I&O's======  I&O's Summary    2025 07:01  -  2025 07:00  --------------------------------------------------------  IN: 300 mL / OUT: 800 mL / NET: -500 mL

## 2025-02-28 NOTE — PROGRESS NOTE ADULT - NUTRITIONAL ASSESSMENT
This patient has been assessed with a concern for Malnutrition and has been determined to have a diagnosis/diagnoses of Severe protein-calorie malnutrition and Underweight (BMI < 19).    This patient is being managed with:   Diet Soft and Bite Sized-  Supplement Feeding Modality:  Oral  Ensure Plus High Protein Cans or Servings Per Day:  1       Frequency:  Three Times a day  Entered: Feb 25 2025 12:56PM  
This patient has been assessed with a concern for Malnutrition and has been determined to have a diagnosis/diagnoses of Severe protein-calorie malnutrition and Underweight (BMI < 19).    This patient is being managed with:   Diet Easy to Chew-  Entered: Feb 24 2025  5:40PM  
This patient has been assessed with a concern for Malnutrition and has been determined to have a diagnosis/diagnoses of Severe protein-calorie malnutrition and Underweight (BMI < 19).    This patient is being managed with:   Diet Soft and Bite Sized-  Entered: Feb 22 2025 12:48PM  
This patient has been assessed with a concern for Malnutrition and has been determined to have a diagnosis/diagnoses of Severe protein-calorie malnutrition and Underweight (BMI < 19).    This patient is being managed with:   Diet Soft and Bite Sized-  Supplement Feeding Modality:  Oral  Ensure Plus High Protein Cans or Servings Per Day:  1       Frequency:  Three Times a day  Entered: Feb 25 2025 12:56PM  
This patient has been assessed with a concern for Malnutrition and has been determined to have a diagnosis/diagnoses of Severe protein-calorie malnutrition and Underweight (BMI < 19).    This patient is being managed with:   Diet Soft and Bite Sized-  Supplement Feeding Modality:  Oral  Ensure Plus High Protein Cans or Servings Per Day:  2       Frequency:  Three Times a day  Entered: Feb 23 2025  2:53PM  
This patient has been assessed with a concern for Malnutrition and has been determined to have a diagnosis/diagnoses of Severe protein-calorie malnutrition and Underweight (BMI < 19).    This patient is being managed with:   Diet Soft and Bite Sized-  Supplement Feeding Modality:  Oral  Ensure Plus High Protein Cans or Servings Per Day:  1       Frequency:  Three Times a day  Entered: Feb 25 2025 12:56PM

## 2025-02-28 NOTE — PROGRESS NOTE ADULT - PROBLEM SELECTOR PLAN 6
-Patient treated with ceftriaxone at [facility] for PNA    Plan:  -CTX and azithromycin (2/21 - 2/25)  -F/u blood cultures, urine cultures, urine strep, urine legionella --> NGTD as of 2/23  RESOLVED

## 2025-02-28 NOTE — DISCHARGE NOTE NURSING/CASE MANAGEMENT/SOCIAL WORK - PATIENT PORTAL LINK FT
You can access the FollowMyHealth Patient Portal offered by [facility] by registering at the following website: [email or web address]. By joining [facility]’s FollowMyHealth portal, you will also be able to view your health information using other applications (apps) compatible with our system.

## 2025-02-28 NOTE — PROGRESS NOTE ADULT - PROBLEM SELECTOR PLAN 3
-Patient taking nebivolol at home but stopped at [facility]    Plan:  -Patient normotensive. Will continue to hold nebivolol

## 2025-02-28 NOTE — CHART NOTE - NSCHARTNOTEFT_GEN_A_CORE
Patient requires a akanksha lift for transfers between bed and chair/wheelchair/commode. Without access to this lift the patient would be bedbound.

## 2025-02-28 NOTE — PROGRESS NOTE ADULT - PROBLEM SELECTOR PLAN 5
-Medications from recent stay at [facility] added  -Per son, patient wakes up a little confused but is baseline AO X3    Plan:  -updated medication reconciliation with info from son, who administers her meds at home  RESOLVED

## 2025-02-28 NOTE — PROGRESS NOTE ADULT - ATTENDING COMMENTS
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 Additionally with UTI, PNA (Compled course of CTX and Azithro 2/22-2/25), NGTD on cultures) with initial c/f ileus now ruled out. Repeat CT here shows metastases in the lung/liver and adrenal glands. Per oncology pt with advanced uncurable dz. Palliative on board- After discussion with oncology team, family has decided to pursue hospice. At this time the daughter would like the patient to be DNR given overall poor prognosis,  home w/ home hospice. 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 Additionally with UTI, PNA (Compled course of CTX and Azithro 2/22-2/25), NGTD on cultures) with initial c/f ileus now ruled out. Repeat CT here shows metastases in the lung/liver and adrenal glands. Per oncology pt with advanced uncurable dz. Palliative on board- After discussion with oncology team, family has decided to pursue hospice. At this time the daughter would like the patient to be DNR given overall poor prognosis,  will go home today with comfort measures

## 2025-02-28 NOTE — PROGRESS NOTE ADULT - TIME BILLING
Total Time Spent 50 minutes.    This includes chart review, patient assessment, discussion and collaboration with interdisciplinary team members, excluding ACP.
Chart review, exam, documentation, orders, d/w family.

## 2025-02-28 NOTE — DISCHARGE NOTE NURSING/CASE MANAGEMENT/SOCIAL WORK - FINANCIAL ASSISTANCE
[facility] provides services at a reduced cost to those who are determined to be eligible through [facility]’s financial assistance program. Information regarding [facility]’s financial assistance program can be found by going to [email or web address] or by calling [phone].

## 2025-02-28 NOTE — PROGRESS NOTE ADULT - PROBLEM SELECTOR PLAN 7
-Patient diagnosed with UTI at [facility]  -Treatment unclear    Plan:  -C/w ceftriaxone for 5 day course, ended 2/25  RESOLVED

## 2025-02-28 NOTE — PROGRESS NOTE ADULT - REASON FOR ADMISSION
Progression of colon CA

## 2025-02-28 NOTE — PROGRESS NOTE ADULT - PROVIDER SPECIALTY LIST ADULT
Heme/Onc
Internal Medicine
Palliative Care

## 2025-02-28 NOTE — DISCHARGE NOTE NURSING/CASE MANAGEMENT/SOCIAL WORK - NSDCPEFALRISK_GEN_ALL_CORE
For information on Fall & Injury Prevention, visit: [email or web address] OR  [email or web address] OR  https://www.cdc.gov/steadi/patient.html

## 2025-02-28 NOTE — PROGRESS NOTE ADULT - ASSESSMENT
88 y/o F w/ PMH HTN. 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

## 2025-02-28 NOTE — PROGRESS NOTE ADULT - PROBLEM SELECTOR PLAN 1
-Patient with a hx of colon cancer (initial appt 7/2024)  -S/p abdominal colectomy w/ end ileostomy (may 2024, c/b hypotension + sicu stay)  -Scans from [place] showing progression of metastases to liver and lung; initial stage in july was III s/p resection with negative margins  -new ct here shows metastasis in the lung/liver and adrenal glands  -initally was c/f ileus but this is not present on our CT and pt's ostomy is full of stool    Plan:  -heme onc not offering treatment  -hospice referral placed, family going to have out-of-pocket aid instead  -c/w simethicone, lomotil  -c/w mirtazipine   -c/w lexapro  -c/w questran

## 2025-03-01 ENCOUNTER — TRANSCRIPTION ENCOUNTER (OUTPATIENT)
Age: 89
End: 2025-03-01

## 2025-03-05 ENCOUNTER — NON-APPOINTMENT (OUTPATIENT)
Age: 89
End: 2025-03-05

## 2025-03-13 ENCOUNTER — APPOINTMENT (OUTPATIENT)
Dept: COLORECTAL SURGERY | Facility: CLINIC | Age: 89
End: 2025-03-13

## 2025-03-17 ENCOUNTER — TRANSCRIPTION ENCOUNTER (OUTPATIENT)
Age: 89
End: 2025-03-17

## 2025-03-18 ENCOUNTER — EMERGENCY (EMERGENCY)
Facility: HOSPITAL | Age: 89
LOS: 1 days | Discharge: HOME | End: 2025-03-18
Attending: EMERGENCY MEDICINE
Payer: MEDICARE

## 2025-03-18 ENCOUNTER — NON-APPOINTMENT (OUTPATIENT)
Age: 89
End: 2025-03-18

## 2025-03-18 VITALS
HEIGHT: 60 IN | HEART RATE: 120 BPM | WEIGHT: 85.1 LBS | SYSTOLIC BLOOD PRESSURE: 87 MMHG | RESPIRATION RATE: 22 BRPM | DIASTOLIC BLOOD PRESSURE: 63 MMHG | OXYGEN SATURATION: 94 % | TEMPERATURE: 98 F

## 2025-03-18 DIAGNOSIS — Z98.890 OTHER SPECIFIED POSTPROCEDURAL STATES: Chronic | ICD-10-CM

## 2025-03-18 LAB
ALBUMIN SERPL ELPH-MCNC: 3.5 G/DL — SIGNIFICANT CHANGE UP (ref 3.3–5)
ALP SERPL-CCNC: 144 U/L — HIGH (ref 40–120)
ALT FLD-CCNC: 17 U/L — SIGNIFICANT CHANGE UP (ref 10–45)
AMORPH CRY # UR COMP ASSIST: PRESENT
ANION GAP SERPL CALC-SCNC: 22 MMOL/L — HIGH (ref 5–17)
APPEARANCE UR: ABNORMAL
APTT BLD: 26.2 SEC — SIGNIFICANT CHANGE UP (ref 24.5–35.6)
AST SERPL-CCNC: 41 U/L — HIGH (ref 10–40)
BACTERIA # UR AUTO: NEGATIVE /HPF — SIGNIFICANT CHANGE UP
BASOPHILS # BLD AUTO: 0.03 K/UL — SIGNIFICANT CHANGE UP (ref 0–0.2)
BASOPHILS NFR BLD AUTO: 0.2 % — SIGNIFICANT CHANGE UP (ref 0–2)
BILIRUB SERPL-MCNC: 1 MG/DL — SIGNIFICANT CHANGE UP (ref 0.2–1.2)
BILIRUB UR-MCNC: NEGATIVE — SIGNIFICANT CHANGE UP
BUN SERPL-MCNC: 79 MG/DL — HIGH (ref 7–23)
CALCIUM SERPL-MCNC: 11.1 MG/DL — HIGH (ref 8.4–10.5)
CAST: 45 /LPF — HIGH (ref 0–4)
CHLORIDE SERPL-SCNC: 101 MMOL/L — SIGNIFICANT CHANGE UP (ref 96–108)
CO2 SERPL-SCNC: 17 MMOL/L — LOW (ref 22–31)
COLOR SPEC: SIGNIFICANT CHANGE UP
CREAT SERPL-MCNC: 2.39 MG/DL — HIGH (ref 0.5–1.3)
DIFF PNL FLD: ABNORMAL
EGFR: 19 ML/MIN/1.73M2 — LOW
EGFR: 19 ML/MIN/1.73M2 — LOW
EOSINOPHIL # BLD AUTO: 0 K/UL — SIGNIFICANT CHANGE UP (ref 0–0.5)
EOSINOPHIL NFR BLD AUTO: 0 % — SIGNIFICANT CHANGE UP (ref 0–6)
GAS PNL BLDV: SIGNIFICANT CHANGE UP
GLUCOSE SERPL-MCNC: 178 MG/DL — HIGH (ref 70–99)
GLUCOSE UR QL: NEGATIVE MG/DL — SIGNIFICANT CHANGE UP
HCT VFR BLD CALC: 39 % — SIGNIFICANT CHANGE UP (ref 34.5–45)
HGB BLD-MCNC: 12.6 G/DL — SIGNIFICANT CHANGE UP (ref 11.5–15.5)
IMM GRANULOCYTES NFR BLD AUTO: 1 % — HIGH (ref 0–0.9)
INR BLD: 1.13 RATIO — SIGNIFICANT CHANGE UP (ref 0.85–1.16)
KETONES UR-MCNC: NEGATIVE MG/DL — SIGNIFICANT CHANGE UP
LEUKOCYTE ESTERASE UR-ACNC: ABNORMAL
LYMPHOCYTES # BLD AUTO: 1.18 K/UL — SIGNIFICANT CHANGE UP (ref 1–3.3)
LYMPHOCYTES # BLD AUTO: 6.4 % — LOW (ref 13–44)
MCHC RBC-ENTMCNC: 30.1 PG — SIGNIFICANT CHANGE UP (ref 27–34)
MCHC RBC-ENTMCNC: 32.3 G/DL — SIGNIFICANT CHANGE UP (ref 32–36)
MCV RBC AUTO: 93.1 FL — SIGNIFICANT CHANGE UP (ref 80–100)
MONOCYTES # BLD AUTO: 1.35 K/UL — HIGH (ref 0–0.9)
MONOCYTES NFR BLD AUTO: 7.3 % — SIGNIFICANT CHANGE UP (ref 2–14)
NEUTROPHILS # BLD AUTO: 15.68 K/UL — HIGH (ref 1.8–7.4)
NEUTROPHILS NFR BLD AUTO: 85.1 % — HIGH (ref 43–77)
NITRITE UR-MCNC: NEGATIVE — SIGNIFICANT CHANGE UP
NRBC BLD AUTO-RTO: 0 /100 WBCS — SIGNIFICANT CHANGE UP (ref 0–0)
PH UR: 5.5 — SIGNIFICANT CHANGE UP (ref 5–8)
PLATELET # BLD AUTO: 583 K/UL — HIGH (ref 150–400)
POTASSIUM SERPL-MCNC: 4.6 MMOL/L — SIGNIFICANT CHANGE UP (ref 3.5–5.3)
POTASSIUM SERPL-SCNC: 4.6 MMOL/L — SIGNIFICANT CHANGE UP (ref 3.5–5.3)
PROT SERPL-MCNC: 8.2 G/DL — SIGNIFICANT CHANGE UP (ref 6–8.3)
PROT UR-MCNC: 30 MG/DL
PROTHROM AB SERPL-ACNC: 12.9 SEC — SIGNIFICANT CHANGE UP (ref 9.9–13.4)
RBC # BLD: 4.19 M/UL — SIGNIFICANT CHANGE UP (ref 3.8–5.2)
RBC # FLD: 15.6 % — HIGH (ref 10.3–14.5)
RBC CASTS # UR COMP ASSIST: 103 /HPF — HIGH (ref 0–4)
REVIEW: SIGNIFICANT CHANGE UP
SODIUM SERPL-SCNC: 140 MMOL/L — SIGNIFICANT CHANGE UP (ref 135–145)
SP GR SPEC: 1.02 — SIGNIFICANT CHANGE UP (ref 1–1.03)
SQUAMOUS # UR AUTO: 6 /HPF — HIGH (ref 0–5)
TROPONIN T, HIGH SENSITIVITY RESULT: 73 NG/L — HIGH (ref 0–51)
UROBILINOGEN FLD QL: 0.2 MG/DL — SIGNIFICANT CHANGE UP (ref 0.2–1)
WBC # BLD: 18.43 K/UL — HIGH (ref 3.8–10.5)
WBC # FLD AUTO: 18.43 K/UL — HIGH (ref 3.8–10.5)
WBC UR QL: 13 /HPF — HIGH (ref 0–5)
YEAST-LIKE CELLS: PRESENT

## 2025-03-18 PROCEDURE — 99291 CRITICAL CARE FIRST HOUR: CPT

## 2025-03-18 PROCEDURE — 71045 X-RAY EXAM CHEST 1 VIEW: CPT | Mod: 26

## 2025-03-18 PROCEDURE — 93010 ELECTROCARDIOGRAM REPORT: CPT

## 2025-03-18 RX ORDER — PIPERACILLIN-TAZO-DEXTROSE,ISO 3.375G/5
3.38 IV SOLUTION, PIGGYBACK PREMIX FROZEN(ML) INTRAVENOUS EVERY 12 HOURS
Refills: 0 | Status: ACTIVE | OUTPATIENT
Start: 2025-03-18 | End: 2025-03-21

## 2025-03-18 RX ORDER — ACETAMINOPHEN 500 MG/5ML
575 LIQUID (ML) ORAL ONCE
Refills: 0 | Status: COMPLETED | OUTPATIENT
Start: 2025-03-18 | End: 2025-03-18

## 2025-03-18 RX ORDER — PIPERACILLIN-TAZO-DEXTROSE,ISO 3.375G/5
3.38 IV SOLUTION, PIGGYBACK PREMIX FROZEN(ML) INTRAVENOUS ONCE
Refills: 0 | Status: COMPLETED | OUTPATIENT
Start: 2025-03-18 | End: 2025-03-18

## 2025-03-18 RX ORDER — RISPERIDONE 4 MG
1 TABLET ORAL ONCE
Refills: 0 | Status: COMPLETED | OUTPATIENT
Start: 2025-03-18 | End: 2025-03-18

## 2025-03-18 RX ORDER — VANCOMYCIN HCL IN 5 % DEXTROSE 1.5G/250ML
1000 PLASTIC BAG, INJECTION (ML) INTRAVENOUS ONCE
Refills: 0 | Status: DISCONTINUED | OUTPATIENT
Start: 2025-03-18 | End: 2025-03-18

## 2025-03-18 RX ORDER — ONDANSETRON HCL/PF 4 MG/2 ML
4 VIAL (ML) INJECTION ONCE
Refills: 0 | Status: COMPLETED | OUTPATIENT
Start: 2025-03-18 | End: 2025-03-18

## 2025-03-18 RX ADMIN — Medication 1 MILLIGRAM(S): at 21:19

## 2025-03-18 RX ADMIN — Medication 200 GRAM(S): at 18:35

## 2025-03-18 RX ADMIN — Medication 4 MILLIGRAM(S): at 19:43

## 2025-03-18 RX ADMIN — Medication 40 MILLILITER(S): at 22:55

## 2025-03-18 RX ADMIN — Medication 230 MILLIGRAM(S): at 19:00

## 2025-03-18 RX ADMIN — Medication 1000 MILLILITER(S): at 18:35

## 2025-03-18 RX ADMIN — Medication 575 MILLIGRAM(S): at 22:39

## 2025-03-18 NOTE — ED PROVIDER NOTE - PHYSICAL EXAMINATION
Const: cachetic appearing  Eyes: no conjunctival injection  HEENT: Head NCAT, Moist MM.  Neck: Trachea midline.   CVS: +S1/S2, No murmurs or gallops  RESP: Unlabored respiratory effort. Clear to auscultation bilaterally.  GI: Nontender/Nondistended, Colostomy bag in place.  No CVA tenderness b/l.   Skin: stage 2 decubitus ulcer on posterior end. and stage 1 decubitus at bilateral heels  Neuro: moving all four extremities  Psych: slow speaking

## 2025-03-18 NOTE — ED CDU PROVIDER INITIAL DAY NOTE - DETAILS
IV hydration, pain control, symptomatic relief of hallucination symptoms with antibiotics, hospice referral.

## 2025-03-18 NOTE — ED ADULT NURSE NOTE - NSFALLHARMRISKINTERV_ED_ALL_ED

## 2025-03-18 NOTE — ED CDU PROVIDER INITIAL DAY NOTE - OBJECTIVE STATEMENT
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 intake for the past 4 days.  Patient with son at bedside providing collateral.  His son states that patient is in the process of getting hospice set up.  Patient has not eaten in the past 4 days.  Son reports patient has been coughing/spitting up more frequently.  In ED, Pt was tachycardiac and febrile. ED had conversation about goals of care with son.  Patient would like IV fluids and antibiotics as necessary. MOLST completed by ED and in patient's chart. SW consulted. At this time, [facility] has not responded. Pt sent to CDU, follow up with home hospice referral in am, IVF for rehydration, abx, pain control.

## 2025-03-18 NOTE — ED PROVIDER NOTE - OBJECTIVE STATEMENT
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 intake for the past 4 days.  Patient with son at bedside providing collateral.  His son states that patient is in the process of getting hospice set up.  Patient has not eaten in the past 4 days.  Son reports patient has been coughing/spitting up more frequently.

## 2025-03-18 NOTE — ED PROVIDER NOTE - CLINICAL SUMMARY MEDICAL DECISION MAKING FREE TEXT BOX
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 intake for the past 4 days.  Vitals notable for tachycardia to the 120s, soft blood pressures 101/69, tachycardic to the 120s.  Physical exam with heart regular rate and rhythm, lungs clear to auscultation, abdomen soft nondistended nontender.  Had conversation about goals of care with son.  Patient would like IV fluids and antibiotics as necessary.  Will obtain septic workup. 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 intake for the past 4 days.  Vitals notable for tachycardia to the 120s, soft blood pressures 101/69, tachycardic to the 120s.  Physical exam with heart regular rate and rhythm, lungs clear to auscultation, abdomen soft nondistended nontender.  Had conversation about goals of care with son.  Patient would like IV fluids and antibiotics as necessary.

## 2025-03-18 NOTE — ED ADULT NURSE NOTE - OBJECTIVE STATEMENT
89 y.o F A&OX1 w. PMH of liver and lung cancer presents to ED complaining of hypotension and tachycardia. Pt BIBEMS from home after decreased PO intake. Pt was found to be tachycardic and hypotensive by family as well. Family in the process of setting up home hospice care. On assessment, pt is noted to have stage 1 ulcers to bilateral heels and stage 1 ulcer to upper midline back covered with Allevyn. Pt also has stage 2 sacral ulcer covered with Allevyn. Pt also has ostomy with clean ostomy bag. Pt noted to be hypoxic on RA and placed on 2L nasal cannula. MD at bedside discussing goals for pt and plan of care

## 2025-03-18 NOTE — ED PROVIDER NOTE - ATTENDING CONTRIBUTION TO CARE
Hx: pt with known stage IV colonc cancer with metastatic disease to liver + other organs with recent admission for UTI and PNA, returning from home with choking episode at home, malaise and fatigue, not eating or drinking for days due to poor appetite.  No obvious fever. +hallucinations.  Pt with similar sxs as last time when she had UTI and PNA.     PE: pt appears malaised, minimal respiratory distress, dry mm, coarse bs base, RRR, ab soft, nt, dry skin.  AWake, alert, does not answer questions.     MDM: acute encephalopathy with dehydration, choking episode, hallucinations in patient with known metastatic disease and prior UTIs and pneumonia.  Based on patient's wishes and HCP's agreement in C, will provide comfort care treatment -- just empirically treat for UTI and pneumonia for comfort reasons, check cxr, start IV fluids, referral to hospice and prioritize having patient at home with appropriate services such as IV fluids, pain meds as needed, and trial of antibiotics for symptomatic management only.

## 2025-03-18 NOTE — ED PROVIDER NOTE - CONVERSATION DETAILS
Pt with known metastatic cancer, stage IV. Poor intake.      What matters most: pt dies at home, does NOT die in hospital.    Concerns: pt may have sepsis, pt was choking and want to avoid dying like that, pt looks uncomfortable, dehydrated.    Addressed: treating sepsis will not change outcome, many side effects from treatment, usually require inpatient treatment.  This not aligned with patient's goals.    Alternative Option: palliative treatment for patient's symptoms, so for 1)hallucinating more and choking with concerns for uti/pneumonia, just treat empirically for comfort reasons.   2)dehydration and not wanting to eat/drink, give iv fluids maintainance.   3)avoid medical diagnostics/treatment such as blood pressure control, glucose control, markers for sepsis, kidney functions, etc... as adjustments in these will not affect outcome.  4)focus on pain control, usefulness for air-hunger/dyspnea/pain.

## 2025-03-19 VITALS
SYSTOLIC BLOOD PRESSURE: 103 MMHG | HEART RATE: 66 BPM | DIASTOLIC BLOOD PRESSURE: 62 MMHG | TEMPERATURE: 98 F | OXYGEN SATURATION: 97 % | RESPIRATION RATE: 18 BRPM

## 2025-03-19 DIAGNOSIS — R05.9 COUGH, UNSPECIFIED: ICD-10-CM

## 2025-03-19 DIAGNOSIS — Z51.5 ENCOUNTER FOR PALLIATIVE CARE: ICD-10-CM

## 2025-03-19 DIAGNOSIS — R62.7 ADULT FAILURE TO THRIVE: ICD-10-CM

## 2025-03-19 DIAGNOSIS — C18.9 MALIGNANT NEOPLASM OF COLON, UNSPECIFIED: ICD-10-CM

## 2025-03-19 DIAGNOSIS — Z91.89 OTHER SPECIFIED PERSONAL RISK FACTORS, NOT ELSEWHERE CLASSIFIED: ICD-10-CM

## 2025-03-19 DIAGNOSIS — R53.2 FUNCTIONAL QUADRIPLEGIA: ICD-10-CM

## 2025-03-19 LAB
CULTURE RESULTS: SIGNIFICANT CHANGE UP
SPECIMEN SOURCE: SIGNIFICANT CHANGE UP

## 2025-03-19 PROCEDURE — 87040 BLOOD CULTURE FOR BACTERIA: CPT

## 2025-03-19 PROCEDURE — 84132 ASSAY OF SERUM POTASSIUM: CPT

## 2025-03-19 PROCEDURE — 99285 EMERGENCY DEPT VISIT HI MDM: CPT

## 2025-03-19 PROCEDURE — 83605 ASSAY OF LACTIC ACID: CPT

## 2025-03-19 PROCEDURE — 82330 ASSAY OF CALCIUM: CPT

## 2025-03-19 PROCEDURE — 85730 THROMBOPLASTIN TIME PARTIAL: CPT

## 2025-03-19 PROCEDURE — 99238 HOSP IP/OBS DSCHRG MGMT 30/<: CPT

## 2025-03-19 PROCEDURE — 84484 ASSAY OF TROPONIN QUANT: CPT

## 2025-03-19 PROCEDURE — 85018 HEMOGLOBIN: CPT

## 2025-03-19 PROCEDURE — 71045 X-RAY EXAM CHEST 1 VIEW: CPT

## 2025-03-19 PROCEDURE — 99285 EMERGENCY DEPT VISIT HI MDM: CPT | Mod: 25

## 2025-03-19 PROCEDURE — 85025 COMPLETE CBC W/AUTO DIFF WBC: CPT

## 2025-03-19 PROCEDURE — 81001 URINALYSIS AUTO W/SCOPE: CPT

## 2025-03-19 PROCEDURE — 99497 ADVNCD CARE PLAN 30 MIN: CPT

## 2025-03-19 PROCEDURE — G0378: CPT

## 2025-03-19 PROCEDURE — 96374 THER/PROPH/DIAG INJ IV PUSH: CPT

## 2025-03-19 PROCEDURE — 93005 ELECTROCARDIOGRAM TRACING: CPT

## 2025-03-19 PROCEDURE — 82803 BLOOD GASES ANY COMBINATION: CPT

## 2025-03-19 PROCEDURE — 82435 ASSAY OF BLOOD CHLORIDE: CPT

## 2025-03-19 PROCEDURE — 87086 URINE CULTURE/COLONY COUNT: CPT

## 2025-03-19 PROCEDURE — 84295 ASSAY OF SERUM SODIUM: CPT

## 2025-03-19 PROCEDURE — 80053 COMPREHEN METABOLIC PANEL: CPT

## 2025-03-19 PROCEDURE — 96376 TX/PRO/DX INJ SAME DRUG ADON: CPT

## 2025-03-19 PROCEDURE — 82947 ASSAY GLUCOSE BLOOD QUANT: CPT

## 2025-03-19 PROCEDURE — 85014 HEMATOCRIT: CPT

## 2025-03-19 PROCEDURE — 99498 ADVNCD CARE PLAN ADDL 30 MIN: CPT

## 2025-03-19 PROCEDURE — 85610 PROTHROMBIN TIME: CPT

## 2025-03-19 PROCEDURE — 96375 TX/PRO/DX INJ NEW DRUG ADDON: CPT

## 2025-03-19 RX ORDER — HYDROMORPHONE/SOD CHLOR,ISO/PF 2 MG/10 ML
1 SYRINGE (ML) INJECTION
Qty: 40 | Refills: 0
Start: 2025-03-19 | End: 2025-03-25

## 2025-03-19 RX ORDER — LORAZEPAM 4 MG/ML
0.25 VIAL (ML) INJECTION
Qty: 10 | Refills: 0
Start: 2025-03-19 | End: 2025-03-25

## 2025-03-19 RX ORDER — ATROPINE SULFATE 1 %
1 OINTMENT (GRAM) OPHTHALMIC (EYE)
Qty: 1 | Refills: 0
Start: 2025-03-19 | End: 2025-03-25

## 2025-03-19 RX ORDER — PROCHLORPERAZINE 25 MG
1 SUPPOSITORY, RECTAL RECTAL
Qty: 14 | Refills: 0
Start: 2025-03-19 | End: 2025-03-25

## 2025-03-19 RX ADMIN — Medication 25 GRAM(S): at 01:26

## 2025-03-19 NOTE — ED CDU PROVIDER SUBSEQUENT DAY NOTE - PROGRESS NOTE DETAILS
pt with metastatic colon CA - pt offers no complaints when questioned.  Awaiting  and SW.  Hospice care. Patient seen at bedside with Dr. Gamboa this morning, resting comfortably, NAD. Palliative care Attg Dr. Chung in CDU, reports she d/w pt's family, pt go home with home hospice, no antibiotics, no IVF, and ED ELHAM Quiroga arranging transport. Pt ready for discharge. Dr. Gamboa aware.

## 2025-03-19 NOTE — ED CDU PROVIDER DISPOSITION NOTE - PATIENT PORTAL LINK FT
You can access the FollowMyHealth Patient Portal offered by [facility] by registering at the following website: [email or web address]. By joining [employer]’s FollowMyHealth portal, you will also be able to view your health information using other applications (apps) compatible with our system.

## 2025-03-19 NOTE — ED ADULT NURSE REASSESSMENT NOTE - NS ED NURSE REASSESS COMMENT FT1
Patient found in stretcher breathing spontaneously and unlabored on Nasal Cannula 2L. No signs of respiratory distress @ this time. The patient is alert and orientated to person (Absent of Birth Year), situation and place but disorientated to time but responds appropriately. The patient presents with a Right AC 20G PIV that is C/D/I and Infusing Zosyn IVPB & Fluids @ this time. Patient noted superficial mid sternum chest pain, on cardiac monitoring in Sinus Tach to 114HR, and notes pain has self resolved at this time. Patient also presents nausea with multiple episodes of emesis that presents as a Phlegm like dark brown color, Zofran given to order. The patient son bedside notes patient had a nutritional drink earlier, resembling the same color as the emesis episodes, ED MD Team aware. Pt denies palpitations, shortness of breath, headache, visual disturbances, numbness/tingling, fever, chills, diaphoresis, constipation, diarrhea, or urinary symptoms. Patient also noted to present with a Ostomy to her RLQ that is C/D/I , stool not visible through ostomy at this time. Safety and comfort measures provided, bed locked and in lowest position, side rails up for safety. VS to order and Awaiting update on the ongoing plan of care. Social Work Rep bedside.
Patient straight cathed for urine using sterile technique. Second RN present to confirm sterility. Explained procedure as it was being done - Pt tolerated procedure well. Sterile specimens collected and sent to lab as ordered. drained aprox 50ml of urine. Comfort and safety provided.
Per MARIE Vasquez pt needs Q shift vitals
Pharmacy contacted a second time for 575mg of Ofirmev
Pt to be comfort care only per MD Cheney after discussion with son at bedside after discussing goals of care
Report received from break coverage HAMMAD Allen. Pt received A&Ox1, vitals retaken and documented. Pt received from HAMMAD Bahena, pt experiencing worsening metastatic cancer, to be placed on hospice care, DNR/DNI with only comfort measures. Plan is for pt to be discharged home tomorrow. Pt received on 2LNC Bed locked and in lowest position, side rails raised. Currently pending SW in AM.
Report taken from Dorota CUEVA in [place] to CDU
War room called to notify patient had six beats of wide complex, Print out given and assessed by ED MD Cheney, Per ED MD discontinue Cardiac monitoring at this time. RN bedside and patient has no complaints at this time.
07.00 Received the Pt from  RN Edgar marc Pt is Observed for Metastatic cancer with dehydration  for Home hospice care . Received the Pt A&OX 2   Pt  is emaciated  has stage 2 bedsore in the sacral region  has  Colostomy  bag  Stoma care given stoma looks healthy  Pt is cleaned Comfort care & safety measures continued  IV site looks clean & dry no signs of infiltration noted pt denies  pain IV site .pending CDU  MD christopher . Pt needs help with ADL  Pt has received  2LT of NS in the last 24 hrs  MARIE Hobbs is made aware  Plan of care ongoing

## 2025-03-19 NOTE — CONSULT NOTE ADULT - CONVERSATION DETAILS
Pt w cognitive decline, unable to make medical decisions for herself, defers decision making to her HCP (daughter, Indu)   Spoke to Indu:  Palliative service introduced.   Clinical condition reviewed.  Daughter confirms pt with ongoing clinical/functional decline w poor/no po intake over last few days. Family has been in the process of arranging hospice care at home as pt's wish is to be at home as she transitions through end stages of life.     Discussed Hospice philosophy of care.   Explained risks vs benefits of ongoing IV hydration at end of life. Daughter in agreement to defer further IV fluids to optimize comfort. No further blood draws, no antibiotics, no further medical interventions.. no further life prolonging measures.   Explained comfort feeding and symptom management.   Life expectancy estimated in days-weeks.     Plan to return home asap to avoid further delirium in the hospital.   HCN referral in place. Family comfortable w pt returning home while awaiting start of hospice care.   MOLST completed to reflect comfort measures only.

## 2025-03-19 NOTE — ED CDU PROVIDER DISPOSITION NOTE - CLINICAL COURSE
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 intake for the past 4 days.  Patient with son at bedside providing collateral.  His son states that patient is in the process of getting hospice set up.  Patient has not eaten in the past 4 days.  Son reports patient has been coughing/spitting up more frequently.  In ED, Pt was tachycardiac and febrile. ED had conversation about goals of care with son.  Patient would like IV fluids and antibiotics as necessary. MOLST completed by ED and in patient's chart. SW consulted. At this time, [facility] has not responded. Pt sent to CDU, follow up with home hospice referral in am, IVF for rehydration, abx, pain control. 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 intake for the past 4 days.  Patient with son at bedside providing collateral.  His son states that patient is in the process of getting hospice set up.  Patient has not eaten in the past 4 days.  Son reports patient has been coughing/spitting up more frequently.  In ED, Pt was tachycardiac and febrile. ED had conversation about goals of care with son.  Patient would like IV fluids and antibiotics as necessary. MOLST completed by ED and in patient's chart. SW consulted. At this time, [facility] has not responded. Pt sent to CDU, follow up with home hospice referral in am, IVF for rehydration, abx, pain control.  In CDU, Patient seen at bedside with Dr. Gamboa, resting comfortably, NAD. Palliative care Attg Dr. Chung in CDU, reports she d/w pt's family, pt go home with home hospice, no antibiotics, no IVF, and ED ELHAM Quiroga arranging transport. Pt ready for discharge.

## 2025-03-19 NOTE — ED CDU PROVIDER DISPOSITION NOTE - NSFOLLOWUPINSTRUCTIONS_ED_ALL_ED_FT
Home Hospice arranged.      Please return to the Emergency Department immediately for any new, worsening, or concerning symptoms. Home Hospice arranged.     Comfort medications as needed as prescribed.      Please return to the Emergency Department immediately for any new, worsening, or concerning symptoms.

## 2025-03-19 NOTE — CONSULT NOTE ADULT - SUBJECTIVE AND OBJECTIVE BOX
Date of Service:03-19-25 @ 09:17  HPI:    PERTINENT PM/SXH:   HTN (hypertension)    Hypothyroidism    Osteoporosis    Major depression    History of subdural hematoma    Neoplasm of digestive system      S/P cystoscopy    H/O craniotomy    Status post craniectomy    Status post craniotomy      FAMILY HISTORY:  Family history of colon cancer in father (Father)      Family Hx substance abuse [ ]yes [ ]no  ITEMS NOT CHECKED ARE NOT PRESENT    SOCIAL HISTORY:   Significant other/partner[ ]  Children[ ]  Denominational/Spirituality:  Substance hx:  [ ]   Tobacco hx:  [ ]   Alcohol hx: [ ]   Home Opioid hx:  [ ] I-Stop Reference No:  Living Situation: [ ]Home  [ ]Long term care  [ ]Rehab [ ]Other    ADVANCE DIRECTIVES:    DNR/MOLST  [ ]  Living Will  [ ]   DECISION MAKER(s):  [ ] Health Care Proxy(s)  [ ] Surrogate(s)  [ ] Guardian           Name(s): Phone Number(s):    BASELINE (I)ADL(s) (prior to admission):  [place]: [ ]Total  [ ] Moderate [ ]Dependent    Allergies    nebivolol (Other)    Intolerances    MEDICATIONS  (STANDING):  piperacillin/tazobactam IVPB.. 3.375 Gram(s) IV Intermittent every 12 hours  sodium chloride 0.9%. 1000 milliLiter(s) (40 mL/Hr) IV Continuous <Continuous>    MEDICATIONS  (PRN):    PRESENT SYMPTOMS: [ ]Unable to self-report  [ ] CPOT [ ] PAINADs [ ] RDOS  Source if other than patient:  [ ]Family   [ ]Team     Pain: [ ]yes [ ]no  QOL impact -   Location -                    Aggravating factors -  Quality -  Radiation -  Timing-  Severity (0-10 scale):  Minimal acceptable level (0-10 scale):     CPOT:    https://www.[facility].org/getattachment/[email or web address]/Critical-Care-Pain-Observation-Tool-(CPOT)    PAINAD Score: See PAINAD tool and score below     Dyspnea:                           [ ]Mild [ ]Moderate [ ]Severe    RDOS: See RDOS tool and score below   0 to 2  minimal or no respiratory distress   3  mild distress  4 to 6 moderate distress  >7 severe distress      Anxiety:                             [ ]Mild [ ]Moderate [ ]Severe  Fatigue:                             [ ]Mild [ ]Moderate [ ]Severe  Nausea:                             [ ]Mild [ ]Moderate [ ]Severe  Loss of appetite:              [ ]Mild [ ]Moderate [ ]Severe  Constipation:                    [ ]Mild [ ]Moderate [ ]Severe    PCSSQ[Palliative Care Spiritual Screening Question]   Severity (0-10):  Score of 4 or > indicate consideration of Chaplaincy referral.  Chaplaincy Referral: [ ] yes [ ] refused [ ] following [ ] Deferred     Caregiver [place]? : [ ] yes [ ] no [ ] Deferred [ ] Declined             Social work referral [ ] Patient & Family Centered Care Referral [ ]     Anticipatory Grief present?:  [ ] yes [ ] no  [ ] Deferred                  Social work referral [ ] Chaplaincy Referral [ ]    		  Other Symptoms:  [ ]All other review of systems negative     Palliative Performance Status Version 2:   See PPSv2 tool and score below          PHYSICAL EXAM:  Vital Signs Last 24 Hrs  T(C): 36.5 (19 Mar 2025 07:59), Max: 38.6 (18 Mar 2025 18:37)  T(F): 97.7 (19 Mar 2025 07:59), Max: 101.4 (18 Mar 2025 18:37)  HR: 86 (19 Mar 2025 07:59) (82 - 120)  BP: 86/54 (19 Mar 2025 07:59) (86/54 - 116/65)  BP(mean): 83 (18 Mar 2025 19:46) (83 - 83)  RR: 20 (19 Mar 2025 07:59) (16 - 23)  SpO2: 97% (19 Mar 2025 07:59) (94% - 97%)    Parameters below as of 19 Mar 2025 07:59  Patient On (Oxygen Delivery Method): nasal cannula  O2 Flow (L/min): 2   I&O's Summary    GENERAL: [ ]Cachexia    [ ]Alert  [ ]Oriented x   [ ]Lethargic  [ ]Unarousable  [ ]Verbal  [ ]Non-Verbal  Behavioral:   [ ] Anxiety  [ ] Delirium [ ] Agitation [ ] Other  HEENT:  [ ]Normal   [ ]Dry mouth   [ ]ET Tube/Trach  [ ]Oral lesions  PULMONARY:   [ ]Clear [ ]Tachypnea  [ ]Audible excessive secretions   [ ]Rhonchi        [ ]Right [ ]Left [ ]Bilateral  [ ]Crackles        [ ]Right [ ]Left [ ]Bilateral  [ ]Wheezing     [ ]Right [ ]Left [ ]Bilateral  [ ]Diminished breath sounds [ ]right [ ]left [ ]bilateral  CARDIOVASCULAR:    [ ]Regular [ ]Irregular [ ]Tachy  [ ]Henri [ ]Murmur [ ]Other  GASTROINTESTINAL:  [ ]Soft  [ ]Distended   [ ]+BS  [ ]Non tender [ ]Tender  [ ]Other [ ]PEG [ ]OGT/ NGT  Last BM:  GENITOURINARY:  [ ]Normal [ ] Incontinent   [ ]Oliguria/Anuria   [ ]Rogers  MUSCULOSKELETAL:   [ ]Normal   [ ]Weakness  [ ]Bed/Wheelchair bound [ ]Edema  NEUROLOGIC:   [ ]No focal deficits  [ ]Cognitive impairment  [ ]Dysphagia [ ]Dysarthria [ ]Paresis [ ]Other   SKIN:   [ ]Normal  [ ]Rash  [ ]Other  [ ]Pressure ulcer(s)       Present on admission [ ]y [ ]n    CRITICAL CARE:  [ ] Shock Present  [ ]Septic [ ]Cardiogenic [ ]Neurologic [ ]Hypovolemic  [ ]  Vasopressors [ ]  Inotropes   [ ]Respiratory failure present [ ]Mechanical ventilation [ ]Non-invasive ventilatory support [ ]High flow    [ ]Acute  [ ]Chronic [ ]Hypoxic  [ ]Hypercarbic [ ]Other  [ ]Other organ failure     LABS:                        12.6   18.43 )-----------( 583      ( 18 Mar 2025 18:32 )             39.0   03-18    140  |  101  |  79[H]  ----------------------------<  178[H]  4.6   |  17[L]  |  2.39[H]    Ca    11.1[H]      18 Mar 2025 18:32    TPro  8.2  /  Alb  3.5  /  TBili  1.0  /  DBili  x   /  AST  41[H]  /  ALT  17  /  AlkPhos  144[H]  03-18  PT/INR - ( 18 Mar 2025 18:32 )   PT: 12.9 sec;   INR: 1.13 ratio         PTT - ( 18 Mar 2025 18:32 )  PTT:26.2 sec    Urinalysis Basic - ( 18 Mar 2025 18:32 )    Color: x / Appearance: x / SG: x / pH: x  Gluc: 178 mg/dL / Ketone: x  / Bili: x / Urobili: x   Blood: x / Protein: x / Nitrite: x   Leuk Esterase: x / RBC: x / WBC x   Sq Epi: x / Non Sq Epi: x / Bacteria: x      RADIOLOGY & ADDITIONAL STUDIES:    PROTEIN CALORIE MALNUTRITION PRESENT: [ ]mild [ ]moderate [ ]severe [ ]underweight [ ]morbid obesity  https://www.andeal.org/vault/2440/web/files/ONC/Table_Clinical%20Characteristics%20to%20Document%20Malnutrition-White%20JV%20et%20al%202012.pdf    Height (cm): 152.4 (03-18-25 @ 17:53), 160 (05-20-24 @ 10:20)  Weight (kg): 38.6 (03-18-25 @ 17:53), 42.41 (07-08-24 @ 15:00), 44.86 (05-20-24 @ 10:20)  BMI (kg/m2): 16.6 (03-18-25 @ 17:53), 16.6 (07-08-24 @ 15:00), 17.5 (05-20-24 @ 10:20)    [ ]PPSV2 < or = to 30% [ ]significant weight loss  [ ]poor nutritional intake  [ ]anasarca[ ]Artificial Nutrition      Other REFERRALS:  [ ]Hospice  [ ]Child Life  [ ]Social Work  [ ]Case management [ ]Holistic Therapy     Goals of Care Document:  Date of Service:03-19-25 @ 09:17  HPI: 89-year-old female with past medical history of hypertension, hypothyroidism, osteoporosis, 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 intake for the past 4 days.   Prior to arrival to hospital family was in the process of setting up home hospice services, referral placed to [facility], however family unable to connect.    Pt seen in CDU, lethargic, arousable, pleasant. She has no specific complaints, wants to return home asap.   No pain/dyspnea, has intermittent cough which self resolves, ++fatigue.       Spoke to pt's HCP (Indu) who provided collateral. See [facility] note for details of conversation.        PERTINENT PM/SXH:   HTN (hypertension)    Hypothyroidism    Osteoporosis    Major depression    History of subdural hematoma    Neoplasm of digestive system      S/P cystoscopy    H/O craniotomy    Status post craniectomy    Status post craniotomy      FAMILY HISTORY:  Family history of colon cancer in father (Father)      Family Hx substance abuse [ ]yes [ ]no  ITEMS NOT CHECKED ARE NOT PRESENT    SOCIAL HISTORY:   Significant other/partner[ ]  Children[ ]  Episcopalian/Spirituality:  Substance hx:  [ ]   Tobacco hx:  [ ]   Alcohol hx: [ ]   Home Opioid hx:  [ ] I-Stop Reference No:  Living Situation: [ ]Home  [ ]Long term care  [ ]Rehab [ ]Other    ADVANCE DIRECTIVES:    DNR/MOLST  [ x]  Living Will  [ ]   DECISION MAKER(s):  [x ] Health Care Proxy(s)  [ ] Surrogate(s)  [ ] Guardian           Name(s): Phone Number(s): Indu Harvey Banerjee     BASELINE (I)ADL(s) (prior to admission):  [place]: [ ]Total  [ ] Moderate [x ]Dependent    Allergies    nebivolol (Other)    Intolerances    MEDICATIONS  (STANDING):  piperacillin/tazobactam IVPB.. 3.375 Gram(s) IV Intermittent every 12 hours  sodium chloride 0.9%. 1000 milliLiter(s) (40 mL/Hr) IV Continuous <Continuous>    MEDICATIONS  (PRN):    PRESENT SYMPTOMS: [ ]Unable to self-report  [ ] CPOT [ ] PAINADs [ ] RDOS  Source if other than patient:  [x ]Family   [ ]Team     Pain: [ ]yes [x ]no  QOL impact -   Location -                    Aggravating factors -  Quality -  Radiation -  Timing-  Severity (0-10 scale):  Minimal acceptable level (0-10 scale):     CPOT:    https://www.[facility].org/getattachment/[email or web address]/Critical-Care-Pain-Observation-Tool-(CPOT)    PAINAD Score: See PAINAD tool and score below     Dyspnea:                           [ ]Mild [ ]Moderate [ ]Severe    RDOS: See RDOS tool and score below   0 to 2  minimal or no respiratory distress   3  mild distress  4 to 6 moderate distress  >7 severe distress      Anxiety:                             [ ]Mild [ ]Moderate [ ]Severe  Fatigue:                             [ ]Mild [ ]Moderate [x ]Severe  Nausea:                             [ ]Mild [ ]Moderate [ ]Severe  Loss of appetite:              [ ]Mild [ ]Moderate [x ]Severe  Constipation:                    [ ]Mild [ ]Moderate [ ]Severe    PCSSQ[Palliative Care Spiritual Screening Question]   Severity (0-10):  Score of 4 or > indicate consideration of Chaplaincy referral.  Chaplaincy Referral: [ ] yes [ ] refused [ ] following [ ] Deferred     Caregiver [place]? : [ ] yes [ ] no [ ] Deferred [ ] Declined             Social work referral [ ] Patient & Family Centered Care Referral [ ]     Anticipatory Grief present?:  [ ] yes [ ] no  [ ] Deferred                  Social work referral [ ] Chaplaincy Referral [ ]    		  Other Symptoms:  [x ]All other review of systems negative     Palliative Performance Status Version 2:   See PPSv2 tool and score below          PHYSICAL EXAM:  Vital Signs Last 24 Hrs  T(C): 36.5 (19 Mar 2025 07:59), Max: 38.6 (18 Mar 2025 18:37)  T(F): 97.7 (19 Mar 2025 07:59), Max: 101.4 (18 Mar 2025 18:37)  HR: 86 (19 Mar 2025 07:59) (82 - 120)  BP: 86/54 (19 Mar 2025 07:59) (86/54 - 116/65)  BP(mean): 83 (18 Mar 2025 19:46) (83 - 83)  RR: 20 (19 Mar 2025 07:59) (16 - 23)  SpO2: 97% (19 Mar 2025 07:59) (94% - 97%)    Parameters below as of 19 Mar 2025 07:59  Patient On (Oxygen Delivery Method): nasal cannula  O2 Flow (L/min): 2   I&O's Summary    GENERAL: [x ]Cachexia    [x ]Alert  [x ]Oriented x  2 [ x]Lethargic  [ ]Unarousable  [x ]Verbal  [ ]Non-Verbal  Behavioral:   [ ] Anxiety  [x ] Delirium [ ] Agitation [ ] Other  HEENT:  [ ]Normal   [x ]Dry mouth   [ ]ET Tube/Trach  [ ]Oral lesions  PULMONARY:   [x ]Clear [ ]Tachypnea  [ ]Audible excessive secretions   [ ]Rhonchi        [ ]Right [ ]Left [ ]Bilateral  [ ]Crackles        [ ]Right [ ]Left [ ]Bilateral  [ ]Wheezing     [ ]Right [ ]Left [ ]Bilateral  [ ]Diminished breath sounds [ ]right [ ]left [ ]bilateral  CARDIOVASCULAR:    [x ]Regular [ ]Irregular [ ]Tachy  [ ]Henri [ ]Murmur [ ]Other  GASTROINTESTINAL:  [x ]Soft  [ ]Distended   [ ]+BS  [ ]Non tender [ ]Tender  [ ]Other [ ]PEG [ ]OGT/ NGT  Last BM:  GENITOURINARY:  [ ]Normal [ ] Incontinent   [ ]Oliguria/Anuria   [ ]Rogers  MUSCULOSKELETAL:   [ ]Normal   [ ]Weakness  [x ]Bed/Wheelchair bound [ ]Edema  NEUROLOGIC:   [ ]No focal deficits  [x ]Cognitive impairment  [ ]Dysphagia [ ]Dysarthria [ ]Paresis [ ]Other   SKIN:   [ ]Normal  [ ]Rash  [ ]Other  [ ]Pressure ulcer(s)       Present on admission [ ]y [ ]n    CRITICAL CARE:  [ ] Shock Present  [ ]Septic [ ]Cardiogenic [ ]Neurologic [ ]Hypovolemic  [ ]  Vasopressors [ ]  Inotropes   [ ]Respiratory failure present [ ]Mechanical ventilation [ ]Non-invasive ventilatory support [ ]High flow    [ ]Acute  [ ]Chronic [ ]Hypoxic  [ ]Hypercarbic [ ]Other  [ ]Other organ failure     LABS:                        12.6   18.43 )-----------( 583      ( 18 Mar 2025 18:32 )             39.0   03-18    140  |  101  |  79[H]  ----------------------------<  178[H]  4.6   |  17[L]  |  2.39[H]    Ca    11.1[H]      18 Mar 2025 18:32    TPro  8.2  /  Alb  3.5  /  TBili  1.0  /  DBili  x   /  AST  41[H]  /  ALT  17  /  AlkPhos  144[H]  03-18  PT/INR - ( 18 Mar 2025 18:32 )   PT: 12.9 sec;   INR: 1.13 ratio         PTT - ( 18 Mar 2025 18:32 )  PTT:26.2 sec    Urinalysis Basic - ( 18 Mar 2025 18:32 )    Color: x / Appearance: x / SG: x / pH: x  Gluc: 178 mg/dL / Ketone: x  / Bili: x / Urobili: x   Blood: x / Protein: x / Nitrite: x   Leuk Esterase: x / RBC: x / WBC x   Sq Epi: x / Non Sq Epi: x / Bacteria: x      RADIOLOGY & ADDITIONAL STUDIES:   < from: Xray Chest 1 View AP/PA (03.18.25 @ 19:37) >  IMPRESSION:  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 No focal consolidation.    Small left pleural effusion.        PROTEIN CALORIE MALNUTRITION PRESENT: [ ]mild [ ]moderate [x ]severe [ ]underweight [ ]morbid obesity  https://www.andeal.org/vault/2440/web/files/ONC/Table_Clinical%20Characteristics%20to%20Document%20Malnutrition-White%20JV%20et%20al%202012.pdf    Height (cm): 152.4 (03-18-25 @ 17:53), 160 (05-20-24 @ 10:20)  Weight (kg): 38.6 (03-18-25 @ 17:53), 42.41 (07-08-24 @ 15:00), 44.86 (05-20-24 @ 10:20)  BMI (kg/m2): 16.6 (03-18-25 @ 17:53), 16.6 (07-08-24 @ 15:00), 17.5 (05-20-24 @ 10:20)    [ ]PPSV2 < or = to 30% [x ]significant weight loss  [x ]poor nutritional intake  [ ]anasarca[ ]Artificial Nutrition      Other REFERRALS:  [x ]Hospice  [ ]Child Life  [ ]Social Work  [ ]Case management [ ]Holistic Therapy     Goals of Care Document:  Date of Service:03-19-25 @ 09:17  HPI: 89-year-old female with past medical history of hypertension, hypothyroidism, osteoporosis, 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 intake for the past 4 days.   Prior to arrival to hospital family was in the process of setting up home hospice services, referral placed to [facility], however family unable to connect.    Pt seen in CDU, lethargic, arousable, pleasant. She has no specific complaints, wants to return home asap.   No pain/dyspnea, has intermittent cough which self resolves, ++fatigue.       Spoke to pt's HCP (Indu) who provided collateral. See [facility] note for details of conversation.        PERTINENT PM/SXH:   HTN (hypertension)    Hypothyroidism    Osteoporosis    Major depression    History of subdural hematoma    Neoplasm of digestive system      S/P cystoscopy    H/O craniotomy    Status post craniectomy    Status post craniotomy      FAMILY HISTORY:  Family history of colon cancer in father (Father)      Family Hx substance abuse [ ]yes [ ]no  ITEMS NOT CHECKED ARE NOT PRESENT    SOCIAL HISTORY:   Significant other/partner[ ]  Children[ ]  Buddhism/Spirituality:  Substance hx:  [ ]   Tobacco hx:  [ ]   Alcohol hx: [ ]   Home Opioid hx:  [ ] I-Stop Reference No:  Living Situation: [ ]Home  [ ]Long term care  [ ]Rehab [ ]Other    ADVANCE DIRECTIVES:    DNR/MOLST  [ x]  Living Will  [ ]   DECISION MAKER(s):  [x ] Health Care Proxy(s)  [ ] Surrogate(s)  [ ] Guardian           Name(s): Phone Number(s): Indu Harvey Banerjee     BASELINE (I)ADL(s) (prior to admission):  [place]: [ ]Total  [ ] Moderate [x ]Dependent    Allergies    nebivolol (Other)    Intolerances    MEDICATIONS  (STANDING):  piperacillin/tazobactam IVPB.. 3.375 Gram(s) IV Intermittent every 12 hours  sodium chloride 0.9%. 1000 milliLiter(s) (40 mL/Hr) IV Continuous <Continuous>    MEDICATIONS  (PRN):    PRESENT SYMPTOMS: [ ]Unable to self-report  [ ] CPOT [ x] PAINADs [ ] RDOS  Source if other than patient:  [x ]Family   [ ]Team     Pain: [ ]yes [x ]no  QOL impact -   Location -                    Aggravating factors -  Quality -  Radiation -  Timing-  Severity (0-10 scale):  Minimal acceptable level (0-10 scale):     CPOT:    https://www.[facility].org/getattachment/[email or web address]-[email or web address]-0050v7134q1t/Critical-Care-Pain-Observation-Tool-(CPOT)    PAINAD Score: See PAINAD tool and score below     Dyspnea:                           [ ]Mild [ ]Moderate [ ]Severe    RDOS: See RDOS tool and score below   0 to 2  minimal or no respiratory distress   3  mild distress  4 to 6 moderate distress  >7 severe distress      Anxiety:                             [ ]Mild [ ]Moderate [ ]Severe  Fatigue:                             [ ]Mild [ ]Moderate [x ]Severe  Nausea:                             [ ]Mild [ ]Moderate [ ]Severe  Loss of appetite:              [ ]Mild [ ]Moderate [x ]Severe  Constipation:                    [ ]Mild [ ]Moderate [ ]Severe    PCSSQ[Palliative Care Spiritual Screening Question]   Severity (0-10):  Score of 4 or > indicate consideration of Chaplaincy referral.  Chaplaincy Referral: [ ] yes [ ] refused [ ] following [ ] Deferred     Caregiver [place]? : [ ] yes [ ] no [ ] Deferred [ ] Declined             Social work referral [ ] Patient & Family Centered Care Referral [ ]     Anticipatory Grief present?:  [ ] yes [ ] no  [ ] Deferred                  Social work referral [ ] Chaplaincy Referral [ ]    		  Other Symptoms:  [x ]All other review of systems negative     Palliative Performance Status Version 2:   See PPSv2 tool and score below          PHYSICAL EXAM:  Vital Signs Last 24 Hrs  T(C): 36.5 (19 Mar 2025 07:59), Max: 38.6 (18 Mar 2025 18:37)  T(F): 97.7 (19 Mar 2025 07:59), Max: 101.4 (18 Mar 2025 18:37)  HR: 86 (19 Mar 2025 07:59) (82 - 120)  BP: 86/54 (19 Mar 2025 07:59) (86/54 - 116/65)  BP(mean): 83 (18 Mar 2025 19:46) (83 - 83)  RR: 20 (19 Mar 2025 07:59) (16 - 23)  SpO2: 97% (19 Mar 2025 07:59) (94% - 97%)    Parameters below as of 19 Mar 2025 07:59  Patient On (Oxygen Delivery Method): nasal cannula  O2 Flow (L/min): 2   I&O's Summary    GENERAL: [x ]Cachexia    [x ]Alert  [x ]Oriented x  2 [ x]Lethargic  [ ]Unarousable  [x ]Verbal  [ ]Non-Verbal  Behavioral:   [ ] Anxiety  [x ] Delirium [ ] Agitation [ ] Other  HEENT:  [ ]Normal   [x ]Dry mouth   [ ]ET Tube/Trach  [ ]Oral lesions  PULMONARY:   [x ]Clear [ ]Tachypnea  [ ]Audible excessive secretions   [ ]Rhonchi        [ ]Right [ ]Left [ ]Bilateral  [ ]Crackles        [ ]Right [ ]Left [ ]Bilateral  [ ]Wheezing     [ ]Right [ ]Left [ ]Bilateral  [ ]Diminished breath sounds [ ]right [ ]left [ ]bilateral  CARDIOVASCULAR:    [x ]Regular [ ]Irregular [ ]Tachy  [ ]Henri [ ]Murmur [ ]Other  GASTROINTESTINAL:  [x ]Soft  [ ]Distended   [ ]+BS  [ ]Non tender [ ]Tender  [ ]Other [ ]PEG [ ]OGT/ NGT  Last BM:  GENITOURINARY:  [ ]Normal [ ] Incontinent   [ ]Oliguria/Anuria   [ ]Rogers  MUSCULOSKELETAL:   [ ]Normal   [ ]Weakness  [x ]Bed/Wheelchair bound [ ]Edema  NEUROLOGIC:   [ ]No focal deficits  [x ]Cognitive impairment  [ ]Dysphagia [ ]Dysarthria [ ]Paresis [ ]Other   SKIN:   [ ]Normal  [ ]Rash  [ ]Other  [ ]Pressure ulcer(s)       Present on admission [ ]y [ ]n    CRITICAL CARE:  [ ] Shock Present  [ ]Septic [ ]Cardiogenic [ ]Neurologic [ ]Hypovolemic  [ ]  Vasopressors [ ]  Inotropes   [ ]Respiratory failure present [ ]Mechanical ventilation [ ]Non-invasive ventilatory support [ ]High flow    [ ]Acute  [ ]Chronic [ ]Hypoxic  [ ]Hypercarbic [ ]Other  [ ]Other organ failure     LABS:                        12.6   18.43 )-----------( 583      ( 18 Mar 2025 18:32 )             39.0   03-18    140  |  101  |  79[H]  ----------------------------<  178[H]  4.6   |  17[L]  |  2.39[H]    Ca    11.1[H]      18 Mar 2025 18:32    TPro  8.2  /  Alb  3.5  /  TBili  1.0  /  DBili  x   /  AST  41[H]  /  ALT  17  /  AlkPhos  144[H]  03-18  PT/INR - ( 18 Mar 2025 18:32 )   PT: 12.9 sec;   INR: 1.13 ratio         PTT - ( 18 Mar 2025 18:32 )  PTT:26.2 sec    Urinalysis Basic - ( 18 Mar 2025 18:32 )    Color: x / Appearance: x / SG: x / pH: x  Gluc: 178 mg/dL / Ketone: x  / Bili: x / Urobili: x   Blood: x / Protein: x / Nitrite: x   Leuk Esterase: x / RBC: x / WBC x   Sq Epi: x / Non Sq Epi: x / Bacteria: x      RADIOLOGY & ADDITIONAL STUDIES:   < from: Xray Chest 1 View AP/PA (03.18.25 @ 19:37) >  IMPRESSION:  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 No focal consolidation.    Small left pleural effusion.        PROTEIN CALORIE MALNUTRITION PRESENT: [ ]mild [ ]moderate [x ]severe [ ]underweight [ ]morbid obesity  https://www.andeal.org/vault/2440/web/files/ONC/Table_Clinical%20Characteristics%20to%20Document%20Malnutrition-White%20JV%20et%20al%202012.pdf    Height (cm): 152.4 (03-18-25 @ 17:53), 160 (05-20-24 @ 10:20)  Weight (kg): 38.6 (03-18-25 @ 17:53), 42.41 (07-08-24 @ 15:00), 44.86 (05-20-24 @ 10:20)  BMI (kg/m2): 16.6 (03-18-25 @ 17:53), 16.6 (07-08-24 @ 15:00), 17.5 (05-20-24 @ 10:20)    [ ]PPSV2 < or = to 30% [x ]significant weight loss  [x ]poor nutritional intake  [ ]anasarca[ ]Artificial Nutrition      Other REFERRALS:  [x ]Hospice  [ ]Child Life  [ ]Social Work  [ ]Case management [ ]Holistic Therapy     Goals of Care Document:

## 2025-03-19 NOTE — CHART NOTE - NSCHARTNOTEFT_GEN_A_CORE
ED SW-    LMSW consulted by ED MD for patient in ED with son at bedside requesting home hospice referral. 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 intake for the past 4 days." Per ED MD, patient to be placed in CDU obs for pain management but consulted SW to send home hospice referral.    LMSW met with patient and spouse at bedside to introduce self and role. Patient appeared alert, able to state name, but very lethargic and unable to fully engage in assessment. Thus, patient's son Julio Gabriel [phone] at bedside to provide demographics. Julio stated patient resides at home with him and has private HHA about 5 hours/7days. Per Julio, patient owns r/w, cane, shower chair, w/c, and brother in-law assists with patient's ileostomy when needed. Julio denied active homecare services. Julio confirmed patient's HCP is his sister Indu Wetzel [phone] (HCP found on sunrise, email sent to medical records to provide copy). Julio stated his sister Indu is aware of patient's ED visit as well. Julio confirmed family in agreement for home hospice as patient's wishes are to die at home. Julio stated his brother in law previously contacted [facility] 1-2 days ago to arrange RN visit for hospice eval, but patient now currently in ED. LMSW educated Julio to Merged with [facility] referral process and services. Julio verbalized understanding. SW informed Julio [facility] most likely will contact sister Indu as she is HCP. Julio verbalized understanding and stated Indu is aware of potential call from hospice. Julio aware and in agreement for patient to be in CDU overnight for pain control. Julio declined further questions/concerns for SW at this time and appreciative. LMSW sent Home Hospice referral to [facility] via [employer]. Per ED MD, TRACE completed and in patient's chart. At this time, [facility] not [facility]. [facility] with [facility].

## 2025-03-19 NOTE — ED CDU PROVIDER SUBSEQUENT DAY NOTE - HISTORY
CDU PROGRESS NOTE PA UNRULY: Pt resting comfortably, NAD, VSS. SpO2 95% on ra. Will continue IVF hydration and monitor overnight.

## 2025-03-19 NOTE — CONSULT NOTE ADULT - PROBLEM SELECTOR RECOMMENDATION 4
self resolving, not distressing   reposition as needed   cont symptom management if refractory  (consideration of antitussives, tessalon perles if tolerated,  +/- low dose opioids if refractory) once home w hospice care

## 2025-03-19 NOTE — ED ADULT NURSE REASSESSMENT NOTE - NSFALLHARMRISKINTERV_ED_ALL_ED

## 2025-03-19 NOTE — CONSULT NOTE ADULT - ASSESSMENT
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 intake for the past 4 days.     Plan to transition home w initiation of hospice services. Pt asymptomatic, hemodynamically stable.   Case d/w CDU team and [facility]

## 2025-03-19 NOTE — CONSULT NOTE ADULT - TIME BILLING
Total time spent including the following  [x] Physical chart review and documentation   An extensive review of the physical chart, electronic health record, and documentation was conducted to obtain collateral information including but not limited to:   - Current inpatient records (ED, H&P, primary team, and consultants [   ])   - Inpatient values/results (CBC, CMP, Imaging)   - Outpatient records   - Prior inpatient records (if available)   - Social work assessments   - Current or proposed treatment plans   - Pharmacotherapy review (including I-STOP if applicable)  []review of medical literature related to pathogenesis, disease/illness progress, treatment and/or prognosis  [x]care coordination  [x]discussion with the primary team  [x]discussion with floor staff  []discussion with consultant(s)  [x]discussion with the patient, surrogate decision maker, or family  [x]Physical Exam and/or review of systems   [x]Formulation of assessment and plan   []Evaluating for response to treatment and side effects of opioids or benzodiazepines      The __50____ minutes spent in ACP (       50          ) were independent to the time spent in time based billing

## 2025-03-19 NOTE — ED POST DISCHARGE NOTE - ADDITIONAL DOCUMENTATION
Comfort care prescriptions sent per Palliative Attg Dr. Chung. Offered to send to [facility], however patient's son requested that medications be sent to their preferred pharmacy. Called by Pharmacist, Birgit, reports unable to fill dilaudid rx, will cancel, asked that I send to [facility] and she will call family and update them. Will re-send to [facility] now. - Tarsha Hobbs PA-C

## 2025-03-19 NOTE — CONSULT NOTE ADULT - PROBLEM SELECTOR RECOMMENDATION 9
GOC as above  DNR/DNI, comfort measures only  MOLST completed in chart    PLan to dc home w [facility] to follow up      It patient develops symptoms recommend the following:  In setting of kidney impairment: Dilaudid solution 1mg/1ml, give 1ml to 2ml q2h PRN for moderate to severe pain or respiratory distress.   Ativan 2mg/ml, give 0.25 ml q2h PRN anxiety agitation or intractable respiratory distress.  Compazine suppository 25mg q12h PRN nausea or vomiting.   Atropine 1% ophthalmic solution. 1 drop PO q4h PRN secretions.

## 2025-03-19 NOTE — CONSULT NOTE ADULT - PROBLEM SELECTOR RECOMMENDATION 7
cont nursing care for all ADLs  comfort feeds as tolerated w aspiration precautions  turn/reposition per nursing protocol  mouth care     OOB as tolerated

## 2025-03-19 NOTE — CONSULT NOTE ADULT - PROBLEM SELECTOR RECOMMENDATION 6
Consider Delirium precautions below:  - place patient's bed near window  - optimize sleep-wake cycle, minimize environmental noise  - reorientation techniques and memory cues such as calendar, clocks, family photos  - use verbal redirection as first line  - minimize restraints and lines

## 2025-03-21 ENCOUNTER — NON-APPOINTMENT (OUTPATIENT)
Age: 89
End: 2025-03-21

## 2025-05-09 NOTE — ED ADULT NURSE NOTE - AVIAN FLU SYMPTOMS
[FreeTextEntry1] :   lmp 4/14 had 2 menses twice in feb sept bled for 1 month urinary sx, seeing dr SAVAGE in July (urinary urgency, planning cysto) not having sex with  bc he's depressed and erectile dysfunction itchy and dry vagina some hot flashes, occ night sweats. having mood swings 
No

## (undated) DEVICE — POSITIONER FOAM EGG CRATE ULNAR 2PCS (PINK)

## (undated) DEVICE — FOLEY TRAY 16FR 5CC LF UMETER CLOSED

## (undated) DEVICE — PROTECTOR HEEL / ELBOW FLUFFY

## (undated) DEVICE — POOLE SUCTION TIP

## (undated) DEVICE — VENODYNE/SCD SLEEVE CALF MEDIUM

## (undated) DEVICE — Device

## (undated) DEVICE — STAPLER SKIN VISI-STAT 35 WIDE

## (undated) DEVICE — DRAPE FLUID WARMER 44 X 66"

## (undated) DEVICE — PACK PERI GYN

## (undated) DEVICE — POSITIONER STRAP ARMBOARD VELCRO TS-30

## (undated) DEVICE — LIGASURE IMPACT

## (undated) DEVICE — OSTOMY KIT 2-PIECE 2.25" NS (RED)

## (undated) DEVICE — DRAPE TOWEL BLUE 17" X 24"

## (undated) DEVICE — SOL IRR POUR H2O 1500ML

## (undated) DEVICE — ELCTR BOVIE BLADE 3/4" EXTENDED LENGTH 6"

## (undated) DEVICE — ELCTR BOVIE PENCIL SMOKE EVACUATION

## (undated) DEVICE — LABELS BLANK W PEN

## (undated) DEVICE — LIGASURE BLUNT TIP 37CM

## (undated) DEVICE — ELCTR GROUNDING PAD ADULT COVIDIEN

## (undated) DEVICE — DRAPE MAYO STAND 23"

## (undated) DEVICE — WARMING BLANKET FULL UNDERBODY

## (undated) DEVICE — SOL IRR POUR NS 0.9% 1500ML

## (undated) DEVICE — PACK MAJOR ABDOMINAL WITH LAP